# Patient Record
Sex: MALE | Race: WHITE | NOT HISPANIC OR LATINO | Employment: OTHER | URBAN - METROPOLITAN AREA
[De-identification: names, ages, dates, MRNs, and addresses within clinical notes are randomized per-mention and may not be internally consistent; named-entity substitution may affect disease eponyms.]

---

## 2019-08-13 ENCOUNTER — OFFICE VISIT (OUTPATIENT)
Dept: INTERNAL MEDICINE | Facility: CLINIC | Age: 69
End: 2019-08-13

## 2019-08-13 VITALS
WEIGHT: 178 LBS | BODY MASS INDEX: 26.36 KG/M2 | HEIGHT: 69 IN | HEART RATE: 78 BPM | OXYGEN SATURATION: 98 % | DIASTOLIC BLOOD PRESSURE: 60 MMHG | SYSTOLIC BLOOD PRESSURE: 120 MMHG

## 2019-08-13 DIAGNOSIS — E55.9 VITAMIN D DEFICIENCY: Chronic | ICD-10-CM

## 2019-08-13 DIAGNOSIS — F17.210 CIGARETTE NICOTINE DEPENDENCE WITHOUT COMPLICATION: ICD-10-CM

## 2019-08-13 DIAGNOSIS — Z51.81 THERAPEUTIC DRUG MONITORING: ICD-10-CM

## 2019-08-13 DIAGNOSIS — Z86.73 HISTORY OF STROKE: Chronic | ICD-10-CM

## 2019-08-13 DIAGNOSIS — K21.00 GASTROESOPHAGEAL REFLUX DISEASE WITH ESOPHAGITIS: ICD-10-CM

## 2019-08-13 DIAGNOSIS — N40.1 BENIGN PROSTATIC HYPERPLASIA WITH WEAK URINARY STREAM: Chronic | ICD-10-CM

## 2019-08-13 DIAGNOSIS — K59.4 PROCTALGIA FUGAX: ICD-10-CM

## 2019-08-13 DIAGNOSIS — R91.8 MULTIPLE PULMONARY NODULES: Chronic | ICD-10-CM

## 2019-08-13 DIAGNOSIS — Z87.39 HISTORY OF GOUT: Chronic | ICD-10-CM

## 2019-08-13 DIAGNOSIS — D75.89 MACROCYTOSIS: ICD-10-CM

## 2019-08-13 DIAGNOSIS — J30.1 SEASONAL ALLERGIC RHINITIS DUE TO POLLEN: Chronic | ICD-10-CM

## 2019-08-13 DIAGNOSIS — Z98.61 HISTORY OF PTCA: Chronic | ICD-10-CM

## 2019-08-13 DIAGNOSIS — R39.12 BENIGN PROSTATIC HYPERPLASIA WITH WEAK URINARY STREAM: Chronic | ICD-10-CM

## 2019-08-13 DIAGNOSIS — A63.0 GENITAL WARTS: Chronic | ICD-10-CM

## 2019-08-13 DIAGNOSIS — Z95.1 HX OF CABG: Chronic | ICD-10-CM

## 2019-08-13 DIAGNOSIS — Z86.010 HISTORY OF COLON POLYPS: Chronic | ICD-10-CM

## 2019-08-13 DIAGNOSIS — I10 BENIGN ESSENTIAL HYPERTENSION: Chronic | ICD-10-CM

## 2019-08-13 DIAGNOSIS — Z86.79 HISTORY OF ABDOMINAL AORTIC ANEURYSM (AAA): Chronic | ICD-10-CM

## 2019-08-13 DIAGNOSIS — J43.9 PULMONARY EMPHYSEMA, UNSPECIFIED EMPHYSEMA TYPE (HCC): Chronic | ICD-10-CM

## 2019-08-13 DIAGNOSIS — Z71.6 ENCOUNTER FOR SMOKING CESSATION COUNSELING: ICD-10-CM

## 2019-08-13 DIAGNOSIS — E78.5 HYPERLIPIDEMIA, UNSPECIFIED HYPERLIPIDEMIA TYPE: Primary | Chronic | ICD-10-CM

## 2019-08-13 DIAGNOSIS — I25.10 OCCLUSIVE CORONARY ARTERY DISEASE: ICD-10-CM

## 2019-08-13 PROBLEM — H10.45 CHRONIC ALLERGIC CONJUNCTIVITIS: Chronic | Status: ACTIVE | Noted: 2019-08-13

## 2019-08-13 PROBLEM — R93.89 ABNORMAL CT OF THE CHEST: Status: RESOLVED | Noted: 2019-08-13 | Resolved: 2019-08-13

## 2019-08-13 PROBLEM — H43.811 POSTERIOR VITREOUS DETACHMENT OF RIGHT EYE: Status: RESOLVED | Noted: 2019-08-13 | Resolved: 2019-08-13

## 2019-08-13 PROBLEM — Z98.890 HISTORY OF CARDIAC CATHETERIZATION: Status: ACTIVE | Noted: 2019-08-13

## 2019-08-13 PROBLEM — Z86.0100 HISTORY OF COLON POLYPS: Chronic | Status: ACTIVE | Noted: 2019-08-13

## 2019-08-13 PROBLEM — Z86.0100 HISTORY OF COLON POLYPS: Status: ACTIVE | Noted: 2019-08-13

## 2019-08-13 PROBLEM — N40.0 BENIGN PROSTATIC HYPERPLASIA WITHOUT LOWER URINARY TRACT SYMPTOMS: Status: ACTIVE | Noted: 2019-08-13

## 2019-08-13 PROBLEM — H43.811 POSTERIOR VITREOUS DETACHMENT OF RIGHT EYE: Status: ACTIVE | Noted: 2019-08-13

## 2019-08-13 PROBLEM — R93.89 ABNORMAL CT OF THE CHEST: Status: ACTIVE | Noted: 2019-08-13

## 2019-08-13 PROBLEM — Z98.890 HISTORY OF CARDIAC CATHETERIZATION: Status: RESOLVED | Noted: 2019-08-13 | Resolved: 2019-08-13

## 2019-08-13 PROBLEM — H10.45 CHRONIC ALLERGIC CONJUNCTIVITIS: Status: ACTIVE | Noted: 2019-08-13

## 2019-08-13 PROCEDURE — 99406 BEHAV CHNG SMOKING 3-10 MIN: CPT | Performed by: INTERNAL MEDICINE

## 2019-08-13 PROCEDURE — 99204 OFFICE O/P NEW MOD 45 MIN: CPT | Performed by: INTERNAL MEDICINE

## 2019-08-13 RX ORDER — ASPIRIN 325 MG
TABLET ORAL
Start: 2019-08-13 | End: 2019-11-21

## 2019-08-13 RX ORDER — ATORVASTATIN CALCIUM 80 MG/1
TABLET, FILM COATED ORAL
Qty: 30 TABLET | Refills: 6
Start: 2019-08-13 | End: 2019-08-21 | Stop reason: SDUPTHER

## 2019-08-13 RX ORDER — CLINDAMYCIN PHOSPHATE 10 MG/G
GEL TOPICAL
COMMUNITY
Start: 2018-11-03 | End: 2019-10-08 | Stop reason: SDUPTHER

## 2019-08-13 RX ORDER — TAMSULOSIN HYDROCHLORIDE 0.4 MG/1
CAPSULE ORAL
Qty: 60 CAPSULE | Refills: 6
Start: 2019-08-13

## 2019-08-13 RX ORDER — ESOMEPRAZOLE MAGNESIUM 40 MG/1
CAPSULE, DELAYED RELEASE ORAL
Start: 2019-08-13 | End: 2019-09-06 | Stop reason: SDUPTHER

## 2019-08-13 RX ORDER — METOPROLOL SUCCINATE 25 MG/1
25 TABLET, EXTENDED RELEASE ORAL DAILY
COMMUNITY
Start: 2019-01-12 | End: 2019-08-13

## 2019-08-13 RX ORDER — FLUTICASONE PROPIONATE 50 MCG
SPRAY, SUSPENSION (ML) NASAL
Start: 2019-08-13 | End: 2019-12-11

## 2019-08-13 RX ORDER — DIAZEPAM 5 MG/1
5 TABLET ORAL
COMMUNITY
Start: 2019-07-26 | End: 2019-08-13 | Stop reason: SDUPTHER

## 2019-08-13 RX ORDER — CHOLECALCIFEROL (VITAMIN D3) 125 MCG
1 TABLET ORAL DAILY
COMMUNITY
End: 2022-05-02

## 2019-08-13 RX ORDER — FLUOROMETHOLONE 0.1 %
SUSPENSION, DROPS(FINAL DOSAGE FORM)(ML) OPHTHALMIC (EYE)
Refills: 5 | COMMUNITY
Start: 2019-08-09

## 2019-08-13 RX ORDER — METOPROLOL SUCCINATE 50 MG/1
50 TABLET, EXTENDED RELEASE ORAL
COMMUNITY
Start: 2019-01-12 | End: 2019-08-13

## 2019-08-13 RX ORDER — CHLORHEXIDINE GLUCONATE 0.12 MG/ML
RINSE ORAL
COMMUNITY
Start: 2019-03-24 | End: 2020-12-10 | Stop reason: SDUPTHER

## 2019-08-13 RX ORDER — TAMSULOSIN HYDROCHLORIDE 0.4 MG/1
CAPSULE ORAL
COMMUNITY
Start: 2019-08-07 | End: 2019-08-13 | Stop reason: SDUPTHER

## 2019-08-13 RX ORDER — PODOFILOX 5 MG/ML
SOLUTION TOPICAL
COMMUNITY
Start: 2018-10-29

## 2019-08-13 RX ORDER — CYANOCOBALAMIN (VITAMIN B-12) 1000 MCG
1 TABLET ORAL DAILY
COMMUNITY

## 2019-08-13 RX ORDER — DIAZEPAM 5 MG/1
TABLET ORAL
Start: 2019-08-13 | End: 2019-09-06 | Stop reason: SDUPTHER

## 2019-08-13 RX ORDER — CLOPIDOGREL BISULFATE 75 MG/1
TABLET ORAL
Qty: 30 TABLET
Start: 2019-08-13

## 2019-08-13 RX ORDER — FLUTICASONE PROPIONATE 50 MCG
2 SPRAY, SUSPENSION (ML) NASAL DAILY
COMMUNITY
Start: 2019-07-25 | End: 2019-08-13 | Stop reason: SDUPTHER

## 2019-08-13 RX ORDER — MULTIVIT WITH MINERALS/LUTEIN
250 TABLET ORAL DAILY
COMMUNITY
End: 2021-11-16

## 2019-08-13 RX ORDER — EZETIMIBE 10 MG/1
TABLET ORAL
Start: 2019-08-13 | End: 2019-08-21 | Stop reason: SDUPTHER

## 2019-08-13 RX ORDER — DESONIDE 0.5 MG/G
CREAM TOPICAL
COMMUNITY
Start: 2018-03-12 | End: 2023-02-21 | Stop reason: SDUPTHER

## 2019-08-13 NOTE — PROGRESS NOTES
08/13/2019    Patient Information  Vernon Willis                                                                                          [address]  [facility] [address]      1950  [unfilled]  There is no work phone number on file.    Chief Complaint:     New patient to get established.  Follow-up hyperlipidemia, esophageal reflux, occlusive coronary artery disease, proctalgia fugax, hyperlipidemia, hypertension, history of AAA, gout, BPH, history of CABG and PTCA, pulmonary emphysema, multiple pulmonary nodules, history of colon polyps, vitamin D deficiency, history of stroke which is questionable, genital warts, environmental allergies/allergic rhinitis.  Has no new acute complaints but has many questions regarding his health.    History of Present Illness:    Patient with a history of multiple medical problems as outlined in chief complaint presents today to follow-up on his medical problems without labs.  His past medical history extensively reviewed and entered into the electronic medical record and updated as much as possible.  Some information is missing and we are attempting to obtain that.    Review of Systems   Constitution: Negative.   HENT: Negative.    Eyes: Negative.    Cardiovascular: Negative.    Respiratory: Negative.    Endocrine: Negative.    Hematologic/Lymphatic: Negative.    Skin: Negative.    Musculoskeletal: Negative.    Gastrointestinal: Negative.         Periodic anorectal pain.   Genitourinary: Negative.    Neurological: Negative.    Psychiatric/Behavioral: Negative.    Allergic/Immunologic: Negative.        Active Problems:    Patient Active Problem List   Diagnosis   • History of abdominal aortic aneurysm (AAA), 6/23/2017--endovascular AAA repair with stent.   • Occlusive coronary artery disease, 12/2/2015--cardiac cath: LIMA to LAD (patent); RADHA to RCA (occluded 100%); SVG to obtuse marginal (occluded 100%); SVG to diagonal (occluded 100%).     • Benign  essential hypertension   • Hyperlipidemia   • History of gout   • Chronic allergic conjunctivitis   • Vitamin D deficiency   • Benign prostatic hyperplasia with weak urinary stream   • Therapeutic drug monitoring   • History of stroke, questionable.   • History of PTCA, 7/15/2013--drug-eluting stent proximal and origin of LM.   • Hx of CABG, 2004--LIMA to LAD; RADHA to RCA; SVG to obtuse marginal; SVG to diagonal.   • Pulmonary emphysema (CMS/HCC)   • Multiple pulmonary nodules, 1/29/2019--minimal change in clustered nodularity and tree-in-bud nodularity upper lobes and right middle lobe.  Recommend yearly low-dose CT screening.   • History of colon polyps   • Genital warts   • Allergic rhinitis   • Gastroesophageal reflux disease with esophagitis   • Proctalgia fugax   • Cigarette nicotine dependence    • Macrocytosis         Past Medical History:   Diagnosis Date   • Abnormal CT of the chest 8/13/2019 January 29, 2019--CT scan of the chest without contrast performed for abnormal CT scan reveals minimal change in clustered nodularity and tree-in-bud nodularity involving dominantly the upper lobes and right middle lobe, likely postinfectious or postinflammatory.  A few new areas of peripheral mucus plugging noted at the right lower lobe.  No new or enlarging pulmonary nodules.  Return to annual s   • Allergic rhinitis 8/13/2019   • Benign essential hypertension 8/13/2019   • Benign prostatic hyperplasia with weak urinary stream 8/13/2019   • Chronic allergic conjunctivitis 8/13/2019   • Cigarette nicotine dependence  8/13/2019   • Gastroesophageal reflux disease with esophagitis 8/13/2019   • Genital warts 8/13/2019   • History of abdominal aortic aneurysm (AAA), 6/23/2017--endovascular AAA repair with stent. 8/13/2019 August 1, 2017--CTA of the abdomen: There has been endovascular repair of the abdominal aortic aneurysm with stent extending from just below the origin of the left renal vein into both common  iliac arteries. No evidence for endoleak on arterial phase imaging. The excluded aneurysmal sac measures 4.8 cm in maximal diameter, previously 5.0 cm. Aortoiliac atherosclerosis with moderate narrowing at th   • History of cardiac catheterization 8/13/2019 December 2, 2015--cardiac catheterization.  INDICATION(S): This is a 64-year-old male with a history of coronary artery disease status post coronary artery bypass grafting. He presented to Dr. Clarke with symptoms consistent with his prior angina. He underwent a nuclear stress test that showed a mid-anterior wall myocardial infarction with a small amount of ischemia in the ventricular apex. He was s   • History of colon polyps 8/13/2019 February 2016--colonoscopy with polypectomy.  Pathology not known.  2010--colonoscopy revealed a serrated adenoma.   • History of gout 8/13/2019   • History of posterior vitreous detachment of right eye, 10/15/2016--repair. 8/13/2019 October 5, 2016--vitrectomy, membrane peel, panretinal endophotocoagulation laser, right eye for preretinal membrane, vitreous hemorrhage, and posterior vitreous detachment of right eye.        • History of PTCA, 7/15/2013--drug-eluting stent proximal and origin of LM. 8/13/2019    July 15, 2013--successful drug-eluting stent PCI to the proximal and origin of the left main using vascular ultrasound guidance.   • History of stroke, questionable. 8/13/2019          • Hx of CABG, 2004--LIMA to LAD; RADHA to RCA; SVG to obtuse marginal; SVG to diagonal. 8/13/2019 December 2, 2015--cardiac catheterization: LIMA to LAD (patent); RADHA to RCA (occluded 100%); SVG to obtuse marginal (occluded 100%); SVG to diagonal (occluded 100%).  2004--four-vessel CABG.  LIMA to LAD; RADHA to RCA; SVG to obtuse marginal; SVG to diagonal.   • Hyperlipidemia 8/13/2019   • Macrocytosis 8/13/2019 July 25, 2019--MCV elevated at 102.2.  Vitamin B12 level was above normal at 1208.  Methylmalonic acid was not performed.    • Multiple pulmonary nodules, 1/29/2019--minimal change in clustered nodularity and tree-in-bud nodularity upper lobes and right middle lobe.  Recommend yearly low-dose CT screening. 8/13/2019 January 29, 2019--CT scan of the chest without contrast performed for abnormal CT scan reveals minimal change in clustered nodularity and tree-in-bud nodularity involving dominantly the upper lobes and right middle lobe, likely postinfectious or postinflammatory.  A few new areas of peripheral mucus plugging noted at the right lower lobe.  No new or enlarging pulmonary nodules.  Return to annual s   • Occlusive coronary artery disease, 12/2/2015--cardiac cath: LIMA to LAD (patent); RADHA to RCA (occluded 100%); SVG to obtuse marginal (occluded 100%); SVG to diagonal (occluded 100%).   8/13/2019 December 2, 2015--cardiac catheterization: LIMA to LAD (patent); RADHA to RCA (occluded 100%); SVG to obtuse marginal (occluded 100%); SVG to diagonal (occluded 100%).  July 15, 2013--successful drug-eluting stent PCI to the proximal and origin of the left main using vascular ultrasound guidance.  2004--four-vessel CABG.  LIMA to LAD; RADHA to RCA; SVG to obtuse marginal; SVG to diagonal.   HPI: Fra   • Proctalgia fugax 8/13/2019   • Pulmonary emphysema (CMS/HCC) 8/13/2019 January 29, 2019--CT scan of the chest without contrast performed for abnormal CT scan reveals minimal change in clustered nodularity and tree-in-bud nodularity involving dominantly the upper lobes and right middle lobe, likely postinfectious or postinflammatory.  A few new areas of peripheral mucus plugging noted at the right lower lobe.  No new or enlarging pulmonary nodules.  Return to annual s   • Vitamin D deficiency 8/13/2019         Past Surgical History:   Procedure Laterality Date   • ABDOMINAL AORTIC ANEURYSM REPAIR W/ ENDOLUMINAL GRAFT  06/23/2017 June 23, 2017--endovascular repair of AAA.   • CARDIAC CATHETERIZATION  12/02/2015 December 2,  2015--cardiac catheterization.  See past medical history for details.   • CATARACT EXTRACTION, BILATERAL     • COLONOSCOPY  2010 2010--colonoscopy revealed a serrated adenoma.   • COLONOSCOPY W/ POLYPECTOMY  02/2016 February 2016--colonoscopy with polypectomy.  Pathology not known.   • CORONARY ANGIOPLASTY WITH STENT PLACEMENT  07/15/2013    July 15, 2013--successful drug-eluting stent PCI to the proximal and origin of the left main using vascular ultrasound guidance.   • CORONARY ARTERY BYPASS GRAFT  2004 2004--four-vessel CABG.  LIMA to LAD; RADHA to RCA; SVG to obtuse marginal; SVG to diagonal.   • PARS PLANA VITRECTOMY W/ ENDOPHOTOCOAGULATION  10/05/2016    October 5, 2016--vitrectomy, membrane peel, panretinal endophotocoagulation laser, right eye for preretinal membrane, vitreous hemorrhage, and posterior vitreous detachment of right eye.   • UPPER GASTROINTESTINAL ENDOSCOPY  02/2016 February 2016--EGD reportedly revealed esophagitis and gastritis.         Allergies   Allergen Reactions   • Codeine Hives   • Msg [Monosodium Glutamate] Other (See Comments)     Passes out   • Penicillins Hives           Current Outpatient Medications:   •  aspirin 325 MG tablet, Take 1 p.o. daily, Disp: , Rfl:   •  atorvastatin (LIPITOR) 80 MG tablet, Take 1 p.o. daily for high cholesterol, Disp: 30 tablet, Rfl: 6  •  chlorhexidine (PAROEX) 0.12 % solution, USE 15 MLS IN THE MOUTH OR THROAT TWO TIMES DAILY (DO NOT SWALLOW), Disp: , Rfl:   •  Cholecalciferol (VITAMIN D3) 5000 units capsule capsule, Take  by mouth., Disp: , Rfl:   •  clindamycin (CLINDAGEL) 1 % gel, APPLY EXTERNALLY TO THE AFFECTED AREA TWICE DAILY, Disp: , Rfl:   •  clopidogrel (PLAVIX) 75 MG tablet, Take 1 p.o. daily for blood thinner, Disp: 30 tablet, Rfl:   •  Cyanocobalamin (B-12) 1000 MCG tablet, Take 1 tablet by mouth Daily., Disp: , Rfl:   •  desonide (DESOWEN) 0.05 % cream, Apply  topically to the appropriate area as directed., Disp: , Rfl:   •   diazePAM (VALIUM) 5 MG tablet, take 1 p.o. daily as needed, Disp: , Rfl:   •  Ergocalciferol (VITAMIN D2) 2000 units tablet, Take 1 tablet by mouth Daily., Disp: , Rfl:   •  esomeprazole (nexIUM) 40 MG capsule, Take 1 p.o. daily before the first meal for reflux, Disp: , Rfl:   •  ezetimibe (ZETIA) 10 MG tablet, Take 1 p.o. daily for high cholesterol, Disp: , Rfl:   •  fluorometholone (FML) 0.1 % ophthalmic suspension, INSTILL 1 DROP INTO EACH EYE TWICE DAILY, Disp: , Rfl: 5  •  fluticasone (FLONASE) 50 MCG/ACT nasal spray, 2 sprays each nostril daily as needed for allergic rhinitis, Disp: , Rfl:   •  podofilox (CONDYLOX) 0.5 % external solution, Apply  topically to the appropriate area as directed., Disp: , Rfl:   •  tamsulosin (FLOMAX) 0.4 MG capsule 24 hr capsule, Take 1 p.o. twice daily for prostate, Disp: 60 capsule, Rfl: 6  •  vitamin C (ASCORBIC ACID) 250 MG tablet, Take 250 mg by mouth Daily., Disp: , Rfl:       Family History   Problem Relation Age of Onset   • Diabetes type II Mother    • Coronary artery disease Father    • Diabetes type II Father    • Kidney disease Father    • Hypertension Father    • Crohn's disease Sister    • Coronary artery disease Brother         Brother  of a myocardial infarction at age 53         Social History     Socioeconomic History   • Marital status:      Spouse name: Not on file   • Number of children: Not on file   • Years of education: Not on file   • Highest education level: Not on file   Occupational History   • Occupation:    Social Needs   • Financial resource strain: Not hard at all   • Food insecurity:     Worry: Never true     Inability: Never true   • Transportation needs:     Medical: No     Non-medical: No   Tobacco Use   • Smoking status: Current Every Day Smoker     Packs/day: 0.50     Types: Cigarettes     Start date:    • Smokeless tobacco: Never Used   Substance and Sexual Activity   • Alcohol use: No     Frequency: Never   • Drug  "use: No   • Sexual activity: Not Currently     Partners: Female   Lifestyle   • Physical activity:     Days per week: 7 days     Minutes per session: 20 min   • Stress: Only a little   Relationships   • Social connections:     Talks on phone: Patient refused     Gets together: Patient refused     Attends Alev[employer] service: Patient refused     Active member of club or organization: Patient refused     Attends meetings of clubs or organizations: Patient refused     Relationship status: Patient refused         Vitals:    08/13/19 1531   BP: 120/60   BP Location: Left arm   Patient Position: Sitting   Cuff Size: Adult   Pulse: 78   SpO2: 98%   Weight: 80.7 kg (178 lb)   Height: 175.3 cm (69\")          Physical Exam:    General: Alert and oriented x 3.  No acute distress.  Normal affect, but patient has somewhat pressured speech and flight of ideas.  Very difficult for him to stay focused on the subject at hand.  HEENT: Pupils equal, round, reactive to light; extraocular movements intact; sclerae nonicteric; pharynx, ear canals and TMs normal.  Neck: Without JVD, thyromegaly, bruit, or adenopathy.  Lungs: Clear to auscultation in all fields.  Heart: Regular rate and rhythm without murmur, rub, gallop, or click.  Abdomen: Soft, nontender, without hepatosplenomegaly or hernia.  Bowel sounds normal.  : Deferred.  Rectal: Deferred.  Extremities: Without clubbing, cyanosis, edema, or pulse deficit.  Neurologic: Intact without focal deficit.  Normal station and gait observed during ingress and egress from the examination room.  Skin: Without significant lesion.  Musculoskeletal: Unremarkable.    Lab/other results:    I reviewed the most recent lab work from his previous doctor.    Assessment/Plan:     Diagnosis Plan   1. Hyperlipidemia, unspecified hyperlipidemia type  ezetimibe (ZETIA) 10 MG tablet    CK    Comprehensive Metabolic Panel    NMR LipoProfile    TSH    T4, Free    T3, Free   2. Gastroesophageal reflux disease " with esophagitis  esomeprazole (nexIUM) 40 MG capsule   3. Occlusive coronary artery disease, 12/2/2015--cardiac cath: LIMA to LAD (patent); RADHA to RCA (occluded 100%); SVG to obtuse marginal (occluded 100%); SVG to diagonal (occluded 100%).    clopidogrel (PLAVIX) 75 MG tablet    aspirin 325 MG tablet   4. Proctalgia fugax  diazePAM (VALIUM) 5 MG tablet   5. Hyperlipidemia  atorvastatin (LIPITOR) 80 MG tablet   6. Benign essential hypertension     7. History of abdominal aortic aneurysm (AAA), 6/23/2017--endovascular AAA repair with stent.     8. History of gout     9. Benign prostatic hyperplasia with weak urinary stream     10. Hx of CABG, 2004--LIMA to LAD; RADHA to RCA; SVG to obtuse marginal; SVG to diagonal.     11. History of PTCA, 7/15/2013--drug-eluting stent proximal and origin of LM.     12. Pulmonary emphysema, unspecified emphysema type (CMS/HCC)     13. Multiple pulmonary nodules, 1/29/2019--minimal change in clustered nodularity and tree-in-bud nodularity upper lobes and right middle lobe.  Recommend yearly low-dose CT screening.     14. History of colon polyps     15. Vitamin D deficiency  Vitamin D 25 Hydroxy   16. History of stroke, questionable.     17. Genital warts     18. Allergic rhinitis  fluticasone (FLONASE) 50 MCG/ACT nasal spray   19. Therapeutic drug monitoring  CBC (No Diff)   20. Cigarette nicotine dependence      21. Macrocytosis  CBC (No Diff)    Methylmalonic Acid, Serum     Patient presents today as a new patient to follow-up on his multiple medical issues.  Patient has hyperlipidemia which apparently has been under reasonable control although his and has never been assessed with an NMR which is important given his occlusive coronary artery disease.  Reflux apparently has not been a big issue.  He continues to have periodic or anorectal pain from proctalgia fugax.  His blood pressure seems to be reasonably controlled.  He has a history of abdominal aortic aneurysm and had an  endovascular stent placed in 2017.  He is followed by the vascular surgeon.  He has a history of gout but is not on allopurinol and that needs to be assessed in the near future.  He does have symptomatic BPH which seems tolerable.  Patient has had multiple cardiac vascular interventions and he continues to smoke.  I had a discussion regarding cigarette smoking with the patient as noted below.  Pulmonary emphysema seems asymptomatic.  Patient does have multiple pulmonary nodules that were just assessed in January which showed minimal change.  He has a history of colon polyps but I do not have the pathology report and will obtain that.  Vitamin D needs to be assessed.  Macrocytosis needs to be evaluated in the near future.    Encounter for smoking cessation: I advised the patient of the risk of continued use tobacco, specifically cigarettes and I provided patient with smoking cessation educational materials and discussed how to quit including the use of medications.  Patient would like to quit but I am not sure that he is currently dedicated to quit.  I had a long discussion regarding his cardiovascular disease and the contribution of cigarette smoking towards progression of this disease and patient seems to understand.  I mentioned that he could utilize some medications including Wellbutrin, Chantix, and nicotine patches/gum.  Patient will consider this and has expressed his willingness to quit but not at the present time.  Approximately 6 minutes was spent discussing smoking cessation with patient today.    Plan is as follows: No change in current medical regimen.  I will continue to review and update patient's medical record.  Patient had recent lab work at his previous doctor's and I reviewed that and therefore I see no need to do any lab work immediately.  However, would like to have an NMR and I think the best thing for us to do is set up fasting lab work, follow-up, and subsequent Medicare wellness visit in  November of this year.    Note: Greater than 45 minutes was spent evaluating this patient today who is new to this practice and the specialty.  Greater than 50% of this time was spent counseling patient regarding his multiple medical problems.  [id number] level service justified.    Procedures

## 2019-08-21 PROBLEM — Z71.6 ENCOUNTER FOR SMOKING CESSATION COUNSELING: Status: ACTIVE | Noted: 2019-08-21

## 2019-08-21 RX ORDER — EZETIMIBE 10 MG/1
TABLET ORAL
Start: 2019-08-21 | End: 2019-10-07 | Stop reason: SDUPTHER

## 2019-08-21 RX ORDER — ATORVASTATIN CALCIUM 80 MG/1
TABLET, FILM COATED ORAL
Qty: 30 TABLET | Refills: 6
Start: 2019-08-21 | End: 2019-10-01 | Stop reason: SDUPTHER

## 2019-09-06 DIAGNOSIS — K21.00 GASTROESOPHAGEAL REFLUX DISEASE WITH ESOPHAGITIS: ICD-10-CM

## 2019-09-06 DIAGNOSIS — K59.4 PROCTALGIA FUGAX: ICD-10-CM

## 2019-09-06 RX ORDER — DIAZEPAM 5 MG/1
TABLET ORAL
Qty: 90 TABLET | Refills: 0 | Status: SHIPPED | OUTPATIENT
Start: 2019-09-06 | End: 2020-01-13

## 2019-09-06 RX ORDER — ESOMEPRAZOLE MAGNESIUM 40 MG/1
CAPSULE, DELAYED RELEASE ORAL
Qty: 90 CAPSULE | Refills: 0 | Status: SHIPPED | OUTPATIENT
Start: 2019-09-06 | End: 2019-09-06 | Stop reason: SDUPTHER

## 2019-09-06 RX ORDER — ESOMEPRAZOLE MAGNESIUM 40 MG/1
CAPSULE, DELAYED RELEASE ORAL
Qty: 90 CAPSULE | Refills: 0 | Status: SHIPPED | OUTPATIENT
Start: 2019-09-06 | End: 2019-11-26 | Stop reason: SDUPTHER

## 2019-09-11 ENCOUNTER — TELEPHONE (OUTPATIENT)
Dept: INTERNAL MEDICINE | Facility: CLINIC | Age: 69
End: 2019-09-11

## 2019-09-11 NOTE — TELEPHONE ENCOUNTER
Pt mailed in his living will to be scanned into his file as well as a request to update pharmacy info to include [employer] mail delivery and [facility].  Pt also noted he will be getting the shingles and tdap vaccine at [facility]

## 2019-10-01 RX ORDER — ATORVASTATIN CALCIUM 80 MG/1
TABLET, FILM COATED ORAL
Qty: 30 TABLET | Refills: 6 | Status: SHIPPED | OUTPATIENT
Start: 2019-10-01 | End: 2019-10-09 | Stop reason: SDUPTHER

## 2019-10-07 RX ORDER — EZETIMIBE 10 MG/1
TABLET ORAL
Qty: 90 TABLET | Refills: 1 | Status: SHIPPED | OUTPATIENT
Start: 2019-10-07 | End: 2020-01-20

## 2019-10-08 ENCOUNTER — CLINICAL SUPPORT (OUTPATIENT)
Dept: INTERNAL MEDICINE | Facility: CLINIC | Age: 69
End: 2019-10-08

## 2019-10-08 DIAGNOSIS — Z23 NEED FOR INFLUENZA VACCINATION: Primary | ICD-10-CM

## 2019-10-08 PROCEDURE — 90653 IIV ADJUVANT VACCINE IM: CPT | Performed by: INTERNAL MEDICINE

## 2019-10-08 PROCEDURE — G0008 ADMIN INFLUENZA VIRUS VAC: HCPCS | Performed by: INTERNAL MEDICINE

## 2019-10-08 RX ORDER — CLINDAMYCIN PHOSPHATE 10 MG/G
GEL TOPICAL 2 TIMES DAILY
Qty: 30 G | Refills: 0 | Status: SHIPPED | OUTPATIENT
Start: 2019-10-08 | End: 2019-10-15 | Stop reason: SDUPTHER

## 2019-10-09 RX ORDER — ATORVASTATIN CALCIUM 80 MG/1
TABLET, FILM COATED ORAL
Qty: 90 TABLET | Refills: 0 | Status: SHIPPED | OUTPATIENT
Start: 2019-10-09 | End: 2020-05-01

## 2019-10-15 RX ORDER — CLINDAMYCIN PHOSPHATE 10 MG/G
GEL TOPICAL 2 TIMES DAILY
Qty: 30 G | Refills: 0 | Status: SHIPPED | OUTPATIENT
Start: 2019-10-15 | End: 2020-05-21

## 2019-11-21 ENCOUNTER — OFFICE VISIT (OUTPATIENT)
Dept: INTERNAL MEDICINE | Facility: CLINIC | Age: 69
End: 2019-11-21

## 2019-11-21 VITALS
HEIGHT: 69 IN | BODY MASS INDEX: 27.11 KG/M2 | DIASTOLIC BLOOD PRESSURE: 60 MMHG | WEIGHT: 183 LBS | SYSTOLIC BLOOD PRESSURE: 116 MMHG | HEART RATE: 66 BPM | OXYGEN SATURATION: 98 %

## 2019-11-21 DIAGNOSIS — Z98.61 HISTORY OF PTCA: Chronic | ICD-10-CM

## 2019-11-21 DIAGNOSIS — E55.9 VITAMIN D DEFICIENCY: Chronic | ICD-10-CM

## 2019-11-21 DIAGNOSIS — F17.210 CIGARETTE NICOTINE DEPENDENCE WITHOUT COMPLICATION: Chronic | ICD-10-CM

## 2019-11-21 DIAGNOSIS — Z86.010 HISTORY OF COLON POLYPS: Chronic | ICD-10-CM

## 2019-11-21 DIAGNOSIS — F17.218 NICOTINE DEPENDENCE, CIGARETTES, WITH OTHER NICOTINE-INDUCED DISORDERS: ICD-10-CM

## 2019-11-21 DIAGNOSIS — E78.5 HYPERLIPIDEMIA, UNSPECIFIED HYPERLIPIDEMIA TYPE: Chronic | ICD-10-CM

## 2019-11-21 DIAGNOSIS — K21.00 GASTROESOPHAGEAL REFLUX DISEASE WITH ESOPHAGITIS: Chronic | ICD-10-CM

## 2019-11-21 DIAGNOSIS — I10 BENIGN ESSENTIAL HYPERTENSION: Chronic | ICD-10-CM

## 2019-11-21 DIAGNOSIS — R39.12 BENIGN PROSTATIC HYPERPLASIA WITH WEAK URINARY STREAM: Chronic | ICD-10-CM

## 2019-11-21 DIAGNOSIS — Z87.39 HISTORY OF GOUT: Chronic | ICD-10-CM

## 2019-11-21 DIAGNOSIS — I25.10 OCCLUSIVE CORONARY ARTERY DISEASE: Chronic | ICD-10-CM

## 2019-11-21 DIAGNOSIS — Z86.79 HISTORY OF ABDOMINAL AORTIC ANEURYSM (AAA): Chronic | ICD-10-CM

## 2019-11-21 DIAGNOSIS — R91.8 MULTIPLE PULMONARY NODULES: Chronic | ICD-10-CM

## 2019-11-21 DIAGNOSIS — Z11.59 NEED FOR HEPATITIS C SCREENING TEST: ICD-10-CM

## 2019-11-21 DIAGNOSIS — K59.4 PROCTALGIA FUGAX: Chronic | ICD-10-CM

## 2019-11-21 DIAGNOSIS — Z86.73 HISTORY OF STROKE: Chronic | ICD-10-CM

## 2019-11-21 DIAGNOSIS — D75.89 MACROCYTOSIS: Chronic | ICD-10-CM

## 2019-11-21 DIAGNOSIS — N40.1 BENIGN PROSTATIC HYPERPLASIA WITH WEAK URINARY STREAM: Chronic | ICD-10-CM

## 2019-11-21 DIAGNOSIS — Z95.1 HX OF CABG: Chronic | ICD-10-CM

## 2019-11-21 DIAGNOSIS — J43.9 PULMONARY EMPHYSEMA, UNSPECIFIED EMPHYSEMA TYPE (HCC): Chronic | ICD-10-CM

## 2019-11-21 DIAGNOSIS — Z00.00 MEDICARE ANNUAL WELLNESS VISIT, SUBSEQUENT: Primary | ICD-10-CM

## 2019-11-21 PROBLEM — Z23 NEED FOR INFLUENZA VACCINATION: Status: RESOLVED | Noted: 2019-10-08 | Resolved: 2019-11-21

## 2019-11-21 PROBLEM — Z71.6 ENCOUNTER FOR SMOKING CESSATION COUNSELING: Status: RESOLVED | Noted: 2019-08-21 | Resolved: 2019-11-21

## 2019-11-21 PROCEDURE — 96160 PT-FOCUSED HLTH RISK ASSMT: CPT | Performed by: INTERNAL MEDICINE

## 2019-11-21 PROCEDURE — G0438 PPPS, INITIAL VISIT: HCPCS | Performed by: INTERNAL MEDICINE

## 2019-11-21 PROCEDURE — 99214 OFFICE O/P EST MOD 30 MIN: CPT | Performed by: INTERNAL MEDICINE

## 2019-11-21 NOTE — PROGRESS NOTES
11/21/2019    Patient Information  Vernon Willis                                                                                          [address]  [facility] [address]      1950  [unfilled]  There is no work phone number on file.    Chief Complaint:     Subsequent Medicare wellness visit.  Follow-up hyperlipidemia, hypertension, occlusive coronary artery disease and history of CABG as well as PTCA, history of AAA, gout, BPH, questionable history of stroke, pulmonary emphysema, multiple pulmonary nodules, history of colon polyps, esophageal reflux, proctalgia fugax, macrocytosis, vitamin D deficiency.  No new acute complaints.    History of Present Illness:    Patient with a history of multiple medical problems as outlined in the chief complaint presents today for subsequent Medicare wellness visit.  Patient also had lab work in order to monitor his chronic medical issues.  His past medical history reviewed and updated were necessary including health maintenance parameters.  This reveals he is up-to-date or else accounted for.    Review of Systems   Constitution: Negative.   HENT: Negative.    Eyes: Negative.    Cardiovascular: Negative.    Respiratory: Negative.    Endocrine: Negative.    Hematologic/Lymphatic: Negative.    Skin: Negative.    Musculoskeletal: Negative.    Gastrointestinal: Negative.    Genitourinary: Negative.    Neurological: Negative.    Psychiatric/Behavioral: Negative.    Allergic/Immunologic: Negative.        Active Problems:    Patient Active Problem List   Diagnosis   • History of abdominal aortic aneurysm (AAA), 6/23/2017--endovascular AAA repair with stent.   • Occlusive coronary artery disease, 12/2/2015--cardiac cath: LIMA to LAD (patent); RADHA to RCA (occluded 100%); SVG to obtuse marginal (occluded 100%); SVG to diagonal (occluded 100%).     • Hyperlipidemia   • History of gout   • Chronic allergic conjunctivitis   • Vitamin D deficiency   • Benign prostatic  hyperplasia with weak urinary stream   • Therapeutic drug monitoring   • History of stroke, questionable.   • History of PTCA, 7/15/2013--drug-eluting stent proximal and origin of LM.   • Hx of CABG, 2004--LIMA to LAD; RADHA to RCA; SVG to obtuse marginal; SVG to diagonal.   • Pulmonary emphysema (CMS/HCC)   • Multiple pulmonary nodules, 1/29/2019--minimal change in clustered nodularity and tree-in-bud nodularity upper lobes and right middle lobe.  Recommend yearly low-dose CT screening.   • History of colon polyps   • Genital warts   • Allergic rhinitis   • Gastroesophageal reflux disease with esophagitis   • Proctalgia fugax   • Cigarette nicotine dependence    • Macrocytosis         Past Medical History:   Diagnosis Date   • Abnormal CT of the chest 8/13/2019 January 29, 2019--CT scan of the chest without contrast performed for abnormal CT scan reveals minimal change in clustered nodularity and tree-in-bud nodularity involving dominantly the upper lobes and right middle lobe, likely postinfectious or postinflammatory.  A few new areas of peripheral mucus plugging noted at the right lower lobe.  No new or enlarging pulmonary nodules.  Return to annual s   • Allergic rhinitis 8/13/2019   • Benign prostatic hyperplasia with weak urinary stream 8/13/2019   • Chronic allergic conjunctivitis 8/13/2019   • Cigarette nicotine dependence  8/13/2019   • Gastroesophageal reflux disease with esophagitis 8/13/2019   • Genital warts 8/13/2019   • History of abdominal aortic aneurysm (AAA), 6/23/2017--endovascular AAA repair with stent. 8/13/2019 August 1, 2017--CTA of the abdomen: There has been endovascular repair of the abdominal aortic aneurysm with stent extending from just below the origin of the left renal vein into both common iliac arteries. No evidence for endoleak on arterial phase imaging. The excluded aneurysmal sac measures 4.8 cm in maximal diameter, previously 5.0 cm. Aortoiliac atherosclerosis with  moderate narrowing at th   • History of cardiac catheterization 8/13/2019 December 2, 2015--cardiac catheterization.  INDICATION(S): This is a 64-year-old male with a history of coronary artery disease status post coronary artery bypass grafting. He presented to Dr. Clarke with symptoms consistent with his prior angina. He underwent a nuclear stress test that showed a mid-anterior wall myocardial infarction with a small amount of ischemia in the ventricular apex. He was s   • History of colon polyps 8/13/2019 February 2016--colonoscopy with polypectomy.  Pathology not known.  2010--colonoscopy revealed a serrated adenoma.   • History of gout 8/13/2019   • History of posterior vitreous detachment of right eye, 10/15/2016--repair. 8/13/2019 October 5, 2016--vitrectomy, membrane peel, panretinal endophotocoagulation laser, right eye for preretinal membrane, vitreous hemorrhage, and posterior vitreous detachment of right eye.        • History of PTCA, 7/15/2013--drug-eluting stent proximal and origin of LM. 8/13/2019    July 15, 2013--successful drug-eluting stent PCI to the proximal and origin of the left main using vascular ultrasound guidance.   • History of stroke, questionable. 8/13/2019          • Hx of CABG, 2004--LIMA to LAD; RADHA to RCA; SVG to obtuse marginal; SVG to diagonal. 8/13/2019 December 2, 2015--cardiac catheterization: LIMA to LAD (patent); RADHA to RCA (occluded 100%); SVG to obtuse marginal (occluded 100%); SVG to diagonal (occluded 100%).  2004--four-vessel CABG.  LIMA to LAD; RADHA to RCA; SVG to obtuse marginal; SVG to diagonal.   • Hyperlipidemia 8/13/2019   • Macrocytosis 8/13/2019 July 25, 2019--MCV elevated at 102.2.  Vitamin B12 level was above normal at 1208.  Methylmalonic acid was not performed.   • Multiple pulmonary nodules, 1/29/2019--minimal change in clustered nodularity and tree-in-bud nodularity upper lobes and right middle lobe.  Recommend yearly low-dose CT screening.  8/13/2019 January 29, 2019--CT scan of the chest without contrast performed for abnormal CT scan reveals minimal change in clustered nodularity and tree-in-bud nodularity involving dominantly the upper lobes and right middle lobe, likely postinfectious or postinflammatory.  A few new areas of peripheral mucus plugging noted at the right lower lobe.  No new or enlarging pulmonary nodules.  Return to annual s   • Occlusive coronary artery disease, 12/2/2015--cardiac cath: LIMA to LAD (patent); RADHA to RCA (occluded 100%); SVG to obtuse marginal (occluded 100%); SVG to diagonal (occluded 100%).   8/13/2019 December 2, 2015--cardiac catheterization: LIMA to LAD (patent); RADHA to RCA (occluded 100%); SVG to obtuse marginal (occluded 100%); SVG to diagonal (occluded 100%).  July 15, 2013--successful drug-eluting stent PCI to the proximal and origin of the left main using vascular ultrasound guidance.  2004--four-vessel CABG.  LIMA to LAD; RADHA to RCA; SVG to obtuse marginal; SVG to diagonal.   HPI: Fra   • Proctalgia fugax 8/13/2019   • Pulmonary emphysema (CMS/HCC) 8/13/2019 January 29, 2019--CT scan of the chest without contrast performed for abnormal CT scan reveals minimal change in clustered nodularity and tree-in-bud nodularity involving dominantly the upper lobes and right middle lobe, likely postinfectious or postinflammatory.  A few new areas of peripheral mucus plugging noted at the right lower lobe.  No new or enlarging pulmonary nodules.  Return to annual s   • Vitamin D deficiency 8/13/2019         Past Surgical History:   Procedure Laterality Date   • ABDOMINAL AORTIC ANEURYSM REPAIR W/ ENDOLUMINAL GRAFT  06/23/2017 June 23, 2017--endovascular repair of AAA.   • CARDIAC CATHETERIZATION  12/02/2015 December 2, 2015--cardiac catheterization.  See past medical history for details.   • CATARACT EXTRACTION, BILATERAL     • COLONOSCOPY  2010 2010--colonoscopy revealed a serrated adenoma.   •  COLONOSCOPY W/ POLYPECTOMY  02/2016 February 2016--colonoscopy with polypectomy.  Pathology not known.   • CORONARY ANGIOPLASTY WITH STENT PLACEMENT  07/15/2013    July 15, 2013--successful drug-eluting stent PCI to the proximal and origin of the left main using vascular ultrasound guidance.   • CORONARY ARTERY BYPASS GRAFT  2004 2004--four-vessel CABG.  LIMA to LAD; RADHA to RCA; SVG to obtuse marginal; SVG to diagonal.   • PARS PLANA VITRECTOMY W/ ENDOPHOTOCOAGULATION  10/05/2016    October 5, 2016--vitrectomy, membrane peel, panretinal endophotocoagulation laser, right eye for preretinal membrane, vitreous hemorrhage, and posterior vitreous detachment of right eye.   • UPPER GASTROINTESTINAL ENDOSCOPY  02/2016 February 2016--EGD reportedly revealed esophagitis and gastritis.         Allergies   Allergen Reactions   • Codeine Hives   • Msg [Monosodium Glutamate] Other (See Comments)     Passes out   • Penicillins Hives           Current Outpatient Medications:   •  aspirin 325 MG tablet, Take 1 p.o. daily, Disp: , Rfl:   •  atorvastatin (LIPITOR) 80 MG tablet, Take 1 p.o. daily for high cholesterol, Disp: 90 tablet, Rfl: 0  •  chlorhexidine (PAROEX) 0.12 % solution, USE 15 MLS IN THE MOUTH OR THROAT TWO TIMES DAILY (DO NOT SWALLOW), Disp: , Rfl:   •  Cholecalciferol (VITAMIN D3) 5000 units capsule capsule, Take  by mouth., Disp: , Rfl:   •  clindamycin (CLINDAGEL) 1 % gel, Apply  topically to the appropriate area as directed 2 (Two) Times a Day. to affected area, Disp: 30 g, Rfl: 0  •  clopidogrel (PLAVIX) 75 MG tablet, Take 1 p.o. daily for blood thinner, Disp: 30 tablet, Rfl:   •  Cyanocobalamin (B-12) 1000 MCG tablet, Take 1 tablet by mouth Daily., Disp: , Rfl:   •  desonide (DESOWEN) 0.05 % cream, Apply  topically to the appropriate area as directed., Disp: , Rfl:   •  diazePAM (VALIUM) 5 MG tablet, take 1 p.o. daily as needed, Disp: 90 tablet, Rfl: 0  •  Ergocalciferol (VITAMIN D2) 2000 units tablet,  Take 1 tablet by mouth Daily., Disp: , Rfl:   •  esomeprazole (nexIUM) 40 MG capsule, Take 1 p.o. daily before the first meal for reflux, Disp: 90 capsule, Rfl: 0  •  ezetimibe (ZETIA) 10 MG tablet, Take 1 p.o. daily for high cholesterol, Disp: 90 tablet, Rfl: 1  •  fluorometholone (FML) 0.1 % ophthalmic suspension, INSTILL 1 DROP INTO EACH EYE TWICE DAILY, Disp: , Rfl: 5  •  fluticasone (FLONASE) 50 MCG/ACT nasal spray, 2 sprays each nostril daily as needed for allergic rhinitis, Disp: , Rfl:   •  podofilox (CONDYLOX) 0.5 % external solution, Apply  topically to the appropriate area as directed., Disp: , Rfl:   •  tamsulosin (FLOMAX) 0.4 MG capsule 24 hr capsule, Take 1 p.o. twice daily for prostate, Disp: 60 capsule, Rfl: 6  •  vitamin C (ASCORBIC ACID) 250 MG tablet, Take 250 mg by mouth Daily., Disp: , Rfl:       Family History   Problem Relation Age of Onset   • Diabetes type II Mother    • Coronary artery disease Father    • Diabetes type II Father    • Kidney disease Father    • Hypertension Father    • Crohn's disease Sister    • Coronary artery disease Brother         Brother  of a myocardial infarction at age 53         Social History     Socioeconomic History   • Marital status:      Spouse name: Not on file   • Number of children: Not on file   • Years of education: Not on file   • Highest education level: Not on file   Occupational History   • Occupation:    Social Needs   • Financial resource strain: Not hard at all   • Food insecurity:     Worry: Never true     Inability: Never true   • Transportation needs:     Medical: No     Non-medical: No   Tobacco Use   • Smoking status: Current Every Day Smoker     Packs/day: 0.50     Types: Cigarettes     Start date:    • Smokeless tobacco: Never Used   Substance and Sexual Activity   • Alcohol use: No     Frequency: Never   • Drug use: No   • Sexual activity: Not Currently     Partners: Female   Lifestyle   • Physical activity:      "Days per week: 7 days     Minutes per session: 20 min   • Stress: Only a little   Relationships   • Social connections:     Talks on phone: Patient refused     Gets together: Patient refused     Attends [employer] service: Patient refused     Active member of club or organization: Patient refused     Attends meetings of clubs or organizations: Patient refused     Relationship status: Patient refused         Vitals:    11/21/19 0947   BP: 116/60   BP Location: Left arm   Pulse: 66   SpO2: 98%   Weight: 83 kg (183 lb)   Height: 175.3 cm (69.02\")        Body mass index is 27.01 kg/m².      Physical Exam:    General: Alert and oriented x 3.  No acute distress.  Normal affect.  Overweight.  HEENT: Pupils equal, round, reactive to light; extraocular movements intact; sclerae nonicteric; pharynx, ear canals and TMs normal.  Neck: Without JVD, thyromegaly, bruit, or adenopathy.  Lungs: Clear to auscultation in all fields.  Heart: Regular rate and rhythm without murmur, rub, gallop, or click.  Abdomen: Soft, nontender, without hepatosplenomegaly or hernia.  Bowel sounds normal.  : Deferred.  Rectal: Deferred.  Extremities: Without clubbing, cyanosis, edema, or pulse deficit.  Neurologic: Intact without focal deficit.  Normal station and gait observed during ingress and egress from the examination room.  Skin: Without significant lesion.  Musculoskeletal: Unremarkable.    Lab/other results:    NMR reveals a total cholesterol 129.  Triglycerides 69.  LDL particle number slightly elevated 1065.  Small LDL particle number slightly elevated at 538.  HDL particle number is normal at 33.7.  CMP normal except potassium slightly elevated at 5.3.  CBC normal except MCV slightly elevated at 97.1.  Methylmalonic acid is normal.  Thyroid function test normal.  Vitamin D normal.  CPK normal.    Assessment/Plan:     Diagnosis Plan   1. Medicare annual wellness visit, subsequent     2. Hyperlipidemia, unspecified hyperlipidemia type     3. " Benign essential hypertension     4. Occlusive coronary artery disease, 12/2/2015--cardiac cath: LIMA to LAD (patent); RADHA to RCA (occluded 100%); SVG to obtuse marginal (occluded 100%); SVG to diagonal (occluded 100%).       5. Hx of CABG, 2004--LIMA to LAD; RADHA to RCA; SVG to obtuse marginal; SVG to diagonal.     6. History of abdominal aortic aneurysm (AAA), 6/23/2017--endovascular AAA repair with stent.     7. History of gout     8. Benign prostatic hyperplasia with weak urinary stream     9. History of stroke, questionable.     10. History of PTCA, 7/15/2013--drug-eluting stent proximal and origin of LM.     11. Pulmonary emphysema, unspecified emphysema type (CMS/HCC)     12. Multiple pulmonary nodules, 1/29/2019--minimal change in clustered nodularity and tree-in-bud nodularity upper lobes and right middle lobe.  Recommend yearly low-dose CT screening.     13. History of colon polyps     14. Gastroesophageal reflux disease with esophagitis     15. Proctalgia fugax     16. Macrocytosis     17. Vitamin D deficiency       The subsequent Medicare wellness visit is documented on separate note.    Patient has hyperlipidemia which is under excellent control which is particularly important given his occlusive coronary artery disease which seems stable.  He also has a history of AAA and had AAA endovascular repair with stent back in 2017.  He has a history of gout with no recurrence.  BPH symptoms are reasonably controlled.  Patient has a questionable history of stroke remotely.  However, patient did provide me with the report of an MRI of the brain back in 2003 which did confirm an infarct of the internal capsule.  He has pulmonary emphysema as well as pulmonary nodules and is recommended that he have yearly low-dose CT screening which will be due in January of next year.  He has a history of colon polyps but I do not have the pathology reports.  Reflux controlled with Nexium.  Patient continues to suffer from  intermittent proctalgia fugax.  Macrocytosis evaluation was negative for B12 deficiency.    Plan is as follows: No change in current medical regimen.  CT scan of the chest ordered.  Patient would like to have that done before the end of the year and I certainly agree.  Patient reports she is going to stop smoking.  Encouragement given.  No changes in current medical regimen.  I will have patient follow-up in 6 months with lab prior or follow-up as needed.    Procedures

## 2019-11-21 NOTE — PROGRESS NOTES
The ABCs of the Annual Wellness Visit  Subsequent Medicare Wellness Visit    No chief complaint on file.      Subjective   History of Present Illness:  Vernon Willis is a 68 y.o. male who presents for a Subsequent Medicare Wellness Visit.    HEALTH RISK ASSESSMENT    Recent Hospitalizations:  No hospitalization(s) within the last year.    Current Medical Providers:  Patient Care Team:  Ryan Hutchinson MD as PCP - General (Internal Medicine)    Smoking Status:  Social History     Tobacco Use   Smoking Status Current Every Day Smoker   • Packs/day: 0.50   • Types: Cigarettes   • Start date: 1970   Smokeless Tobacco Never Used       Alcohol Consumption:  Social History     Substance and Sexual Activity   Alcohol Use No   • Frequency: Never   • Binge frequency: Never       Depression Screen:   PHQ-2/PHQ-9 Depression Screening 11/21/2019   Little interest or pleasure in doing things 0   Feeling down, depressed, or hopeless 0   Total Score 0       Fall Risk Screen:  JERRY Fall Risk Assessment was completed, and patient is at LOW risk for falls.Assessment completed on:8/13/2019    Health Habits and Functional and Cognitive Screening:  Functional & Cognitive Status 11/21/2019   Do you have difficulty preparing food and eating? No   Do you have difficulty bathing yourself, getting dressed or grooming yourself? No   Do you have difficulty using the toilet? No   Do you have difficulty moving around from place to place? No   Do you have trouble with steps or getting out of a bed or a chair? No   Current Diet Well Balanced Diet   Dental Exam Up to date   Eye Exam Up to date   Exercise (times per week) 5 times per week   Current Exercise Activities Include Cardiovasular Workout on Exercise Equipment   Do you need help using the phone?  No   Are you deaf or do you have serious difficulty hearing?  No   Do you need help with transportation? No   Do you need help shopping? No   Do you need help preparing meals?  No   Do you  need help with housework?  No   Do you need help with laundry? No   Do you need help taking your medications? No   Do you need help managing money? No   Do you ever drive or ride in a car without wearing a seat belt? No   Have you felt unusual stress, anger or loneliness in the last month? No   Who do you live with? Alone   If you need help, do you have trouble finding someone available to you? No   Have you been bothered in the last four weeks by sexual problems? No   Do you have difficulty concentrating, remembering or making decisions? No         Does the patient have evidence of cognitive impairment? No    Asprin use counseling:Taking ASA appropriately as indicated    Age-appropriate Screening Schedule:  Refer to the list below for future screening recommendations based on patient's age, sex and/or medical conditions. Orders for these recommended tests are listed in the plan section. The patient has been provided with a written plan.    Health Maintenance   Topic Date Due   • LIPID PANEL  11/14/2020   • COLONOSCOPY  02/01/2026   • INFLUENZA VACCINE  Completed   • PNEUMOCOCCAL VACCINES (65+ LOW/MEDIUM RISK)  Completed   • TDAP/TD VACCINES  Discontinued   • ZOSTER VACCINE  Discontinued          The following portions of the patient's history were reviewed and updated as appropriate: allergies, current medications, past family history, past medical history, past social history, past surgical history and problem list.    Outpatient Medications Prior to Visit   Medication Sig Dispense Refill   • aspirin 81 MG tablet Take 81 mg by mouth Daily.     • atorvastatin (LIPITOR) 80 MG tablet Take 1 p.o. daily for high cholesterol 90 tablet 0   • chlorhexidine (PAROEX) 0.12 % solution USE 15 MLS IN THE MOUTH OR THROAT TWO TIMES DAILY (DO NOT SWALLOW)     • Cholecalciferol (VITAMIN D3) 5000 units capsule capsule Take  by mouth.     • clindamycin (CLINDAGEL) 1 % gel Apply  topically to the appropriate area as directed 2 (Two)  Times a Day. to affected area 30 g 0   • clopidogrel (PLAVIX) 75 MG tablet Take 1 p.o. daily for blood thinner 30 tablet    • Cyanocobalamin (B-12) 1000 MCG tablet Take 1 tablet by mouth Daily.     • desonide (DESOWEN) 0.05 % cream Apply  topically to the appropriate area as directed.     • diazePAM (VALIUM) 5 MG tablet take 1 p.o. daily as needed 90 tablet 0   • Ergocalciferol (VITAMIN D2) 2000 units tablet Take 1 tablet by mouth Daily.     • esomeprazole (nexIUM) 40 MG capsule Take 1 p.o. daily before the first meal for reflux 90 capsule 0   • ezetimibe (ZETIA) 10 MG tablet Take 1 p.o. daily for high cholesterol 90 tablet 1   • fluorometholone (FML) 0.1 % ophthalmic suspension INSTILL 1 DROP INTO EACH EYE TWICE DAILY  5   • fluticasone (FLONASE) 50 MCG/ACT nasal spray 2 sprays each nostril daily as needed for allergic rhinitis     • podofilox (CONDYLOX) 0.5 % external solution Apply  topically to the appropriate area as directed.     • tamsulosin (FLOMAX) 0.4 MG capsule 24 hr capsule Take 1 p.o. twice daily for prostate 60 capsule 6   • vitamin C (ASCORBIC ACID) 250 MG tablet Take 250 mg by mouth Daily.     • aspirin 325 MG tablet Take 1 p.o. daily       No facility-administered medications prior to visit.        Patient Active Problem List   Diagnosis   • History of abdominal aortic aneurysm (AAA), 6/23/2017--endovascular AAA repair with stent.   • Occlusive coronary artery disease, 12/2/2015--cardiac cath: LIMA to LAD (patent); RADHA to RCA (occluded 100%); SVG to obtuse marginal (occluded 100%); SVG to diagonal (occluded 100%).     • Hyperlipidemia   • History of gout   • Chronic allergic conjunctivitis   • Vitamin D deficiency   • Benign prostatic hyperplasia with weak urinary stream   • Therapeutic drug monitoring   • History of stroke   • History of PTCA, 7/15/2013--drug-eluting stent proximal and origin of LM.   • Hx of CABG, 2004--LIMA to LAD; RADHA to RCA; SVG to obtuse marginal; SVG to diagonal.   •  "Pulmonary emphysema (CMS/HCC)   • Multiple pulmonary nodules, 1/29/2019--minimal change in clustered nodularity and tree-in-bud nodularity upper lobes and right middle lobe.  Recommend yearly low-dose CT screening.   • History of colon polyps   • Genital warts   • Allergic rhinitis   • Gastroesophageal reflux disease with esophagitis   • Proctalgia fugax   • Cigarette nicotine dependence    • Macrocytosis       Advanced Care Planning:  Patient has an advance directive - a copy has been provided and is visible in patient header    Review of Systems   Genitourinary:        Slow weak stream.  Nocturia x3-4.   All other systems reviewed and are negative.      Compared to one year ago, the patient feels his physical health is better.  Compared to one year ago, the patient feels his mental health is the same.    Reviewed chart for potential of high risk medication in the elderly: yes  Reviewed chart for potential of harmful drug interactions in the elderly:yes    Objective         Vitals:    11/21/19 0947   BP: 116/60   BP Location: Left arm   Pulse: 66   SpO2: 98%   Weight: 83 kg (183 lb)   Height: 175.3 cm (69.02\")       Body mass index is 27.01 kg/m².  Discussed the patient's BMI with him. The BMI is above average; BMI management plan is completed.    Physical Exam  General: Alert and oriented x 3.  No acute distress.  Normal affect.  Overweight.  HEENT: Pupils equal, round, reactive to light; extraocular movements intact; sclerae nonicteric; pharynx, ear canals and TMs normal.  Neck: Without JVD, thyromegaly, bruit, or adenopathy.  Lungs: Clear to auscultation in all fields.  Heart: Regular rate and rhythm without murmur, rub, gallop, or click.  Abdomen: Soft, nontender, without hepatosplenomegaly or hernia.  Bowel sounds normal.  : Deferred.  Rectal: Deferred.  Extremities: Without clubbing, cyanosis, edema, or pulse deficit.  Neurologic: Intact without focal deficit.  Normal station and gait observed during " ingress and egress from the examination room.  Skin: Without significant lesion.  Musculoskeletal: Unremarkable.    Lab Results   Component Value Date    GLU 94 11/14/2019    CHLPL 129 11/14/2019    TRIG 69 11/14/2019        Assessment/Plan   Medicare Risks and Personalized Health Plan  CMS Preventative Services Quick Reference  Cardiovascular risk  Diabetic Lab Screening   Obesity/Overweight   Prostate Cancer Screening   Tobacco Use/Dependance (use dotphrase .tobaccocessation for documentation)    The above risks/problems have been discussed with the patient.  Pertinent information has been shared with the patient in the After Visit Summary.  Follow up plans and orders are seen below in the Assessment/Plan Section.    Diagnoses and all orders for this visit:    1. Medicare annual wellness visit, subsequent (Primary)    2. Hyperlipidemia, unspecified hyperlipidemia type  -     CK; Future  -     Comprehensive Metabolic Panel; Future  -     NMR LipoProfile; Future  -     TSH; Future  -     T4, Free; Future  -     T3, Free; Future    3. Benign essential hypertension    4. Occlusive coronary artery disease, 12/2/2015--cardiac cath: LIMA to LAD (patent); RADHA to RCA (occluded 100%); SVG to obtuse marginal (occluded 100%); SVG to diagonal (occluded 100%).      5. Hx of CABG, 2004--LIMA to LAD; RADHA to RCA; SVG to obtuse marginal; SVG to diagonal.    6. History of abdominal aortic aneurysm (AAA), 6/23/2017--endovascular AAA repair with stent.    7. History of gout  -     Uric Acid; Future    8. Benign prostatic hyperplasia with weak urinary stream    9. History of stroke, questionable.    10. History of PTCA, 7/15/2013--drug-eluting stent proximal and origin of LM.    11. Pulmonary emphysema, unspecified emphysema type (CMS/[facility])    12. Multiple pulmonary nodules, 1/29/2019--minimal change in clustered nodularity and tree-in-bud nodularity upper lobes and right middle lobe.  Recommend yearly low-dose CT screening.    13.  History of colon polyps    14. Gastroesophageal reflux disease with esophagitis    15. Proctalgia fugax    16. Macrocytosis  -     CBC (No Diff); Future    17. Vitamin D deficiency  -     Vitamin D 25 Hydroxy; Future    18. Need for hepatitis C screening test  -     Hepatitis C Antibody; Future      Follow Up:  No Follow-up on file.     An After Visit Summary and PPPS were given to the patient.

## 2019-11-26 ENCOUNTER — TELEPHONE (OUTPATIENT)
Dept: INTERNAL MEDICINE | Facility: CLINIC | Age: 69
End: 2019-11-26

## 2019-11-26 DIAGNOSIS — K21.00 GASTROESOPHAGEAL REFLUX DISEASE WITH ESOPHAGITIS: ICD-10-CM

## 2019-11-26 RX ORDER — ESOMEPRAZOLE MAGNESIUM 40 MG/1
CAPSULE, DELAYED RELEASE ORAL
Qty: 90 CAPSULE | Refills: 0 | Status: SHIPPED | OUTPATIENT
Start: 2019-11-26 | End: 2020-02-17

## 2019-11-26 NOTE — TELEPHONE ENCOUNTER
Regarding: Referral Request  Contact: [phone]  ----- Message from Mychart, Generic sent at 11/26/2019  7:02 AM EST -----    GOOD MORNING,,,,    DR HOLCOMB,,,,WANTED MY CT, OF NODULE IN RIGHT LUNG,,,TO BE DONE  IN DECEMBER, @ [facility],,,,,,IT HAS ALMOST BEEN A YEAR,   SINCE LAST CT,,,,,,THE PAST 4, ALL DONE @ [place],,,,,PROBABLY BEST, ALL  ARE @ SAME PLACE,,,,,FOR COMPARISIONS REASONS,,,,,,,PLEASE SCHEDULE,,,,,ANY  DAY BUT 12/11,,,,AM, IS ALWAYS BEST FOR ME,,,,,THANKS MUCH,,,,,ENJOY THANKSGIVING,,,,LEWIS

## 2019-12-04 RX ORDER — CLINDAMYCIN PHOSPHATE 10 MG/G
GEL TOPICAL
Qty: 30 G | Refills: 0 | Status: SHIPPED | OUTPATIENT
Start: 2019-12-04 | End: 2020-02-21 | Stop reason: SDUPTHER

## 2019-12-09 ENCOUNTER — TELEPHONE (OUTPATIENT)
Dept: INTERNAL MEDICINE | Facility: CLINIC | Age: 69
End: 2019-12-09

## 2019-12-11 DIAGNOSIS — J30.1 SEASONAL ALLERGIC RHINITIS DUE TO POLLEN: Chronic | ICD-10-CM

## 2019-12-11 RX ORDER — FLUTICASONE PROPIONATE 50 MCG
SPRAY, SUSPENSION (ML) NASAL
Start: 2019-12-11 | End: 2019-12-17 | Stop reason: SDUPTHER

## 2019-12-17 DIAGNOSIS — J30.1 SEASONAL ALLERGIC RHINITIS DUE TO POLLEN: Chronic | ICD-10-CM

## 2019-12-17 RX ORDER — FLUTICASONE PROPIONATE 50 MCG
SPRAY, SUSPENSION (ML) NASAL
Qty: 9.9 ML | Refills: 2 | Status: SHIPPED | OUTPATIENT
Start: 2019-12-17 | End: 2020-01-14 | Stop reason: SDUPTHER

## 2020-01-13 DIAGNOSIS — K59.4 PROCTALGIA FUGAX: ICD-10-CM

## 2020-01-13 RX ORDER — DIAZEPAM 5 MG/1
TABLET ORAL
Qty: 30 TABLET | Refills: 0 | Status: SHIPPED | OUTPATIENT
Start: 2020-01-13 | End: 2020-03-03

## 2020-01-14 DIAGNOSIS — J30.1 SEASONAL ALLERGIC RHINITIS DUE TO POLLEN: Chronic | ICD-10-CM

## 2020-01-14 RX ORDER — FLUTICASONE PROPIONATE 50 MCG
SPRAY, SUSPENSION (ML) NASAL
Qty: 9.9 ML | Refills: 2 | Status: SHIPPED | OUTPATIENT
Start: 2020-01-14 | End: 2020-07-07

## 2020-01-20 RX ORDER — EZETIMIBE 10 MG/1
TABLET ORAL
Qty: 90 TABLET | Refills: 0 | Status: SHIPPED | OUTPATIENT
Start: 2020-01-20 | End: 2020-04-08

## 2020-01-30 ENCOUNTER — TELEPHONE (OUTPATIENT)
Dept: INTERNAL MEDICINE | Facility: CLINIC | Age: 70
End: 2020-01-30

## 2020-02-04 ENCOUNTER — TELEPHONE (OUTPATIENT)
Dept: INTERNAL MEDICINE | Facility: CLINIC | Age: 70
End: 2020-02-04

## 2020-02-04 NOTE — TELEPHONE ENCOUNTER
----- Message from Vernon Lo sent at 2/4/2020 10:05 AM EST -----  Regarding: Referral Request  Contact: [phone]  GOOD MORNING, NICKI    LAST FRIDAY, EAST SCHEDULING, CALLED ABOUT CT,     THEY INQUIRED, IF I HAD DONE BEFORE,,,,,   TOLD THEM, IT WAS ANNUAL,,AND I HAD A SMALL NODULE,,SHOWN IN PRIORS,,    THEY SEEMED CONFUSED, ABOUT, USING LOW RADIATION, VS NORMAL CT    I DID NOT RECIEVE CALL BACK,,THEY WANTED TO TALK TO DR HOLCOMB,,,    PLEASE, TAKE CARE OF THIS,,,,,,THANKS,,,,VERNON LO  2/4/20

## 2020-02-11 ENCOUNTER — HOSPITAL ENCOUNTER (OUTPATIENT)
Dept: CT IMAGING | Facility: HOSPITAL | Age: 70
Discharge: HOME | End: 2020-02-11
Admitting: INTERNAL MEDICINE

## 2020-02-11 DIAGNOSIS — R91.8 MULTIPLE PULMONARY NODULES: Chronic | ICD-10-CM

## 2020-02-11 DIAGNOSIS — F17.218 NICOTINE DEPENDENCE, CIGARETTES, WITH OTHER NICOTINE-INDUCED DISORDERS: ICD-10-CM

## 2020-02-11 DIAGNOSIS — J43.9 PULMONARY EMPHYSEMA, UNSPECIFIED EMPHYSEMA TYPE (HCC): Chronic | ICD-10-CM

## 2020-02-11 PROCEDURE — G0297 LDCT FOR LUNG CA SCREEN: HCPCS

## 2020-02-17 DIAGNOSIS — K21.00 GASTROESOPHAGEAL REFLUX DISEASE WITH ESOPHAGITIS: ICD-10-CM

## 2020-02-17 RX ORDER — ESOMEPRAZOLE MAGNESIUM 40 MG/1
CAPSULE, DELAYED RELEASE ORAL
Qty: 90 CAPSULE | Refills: 0 | Status: SHIPPED | OUTPATIENT
Start: 2020-02-17 | End: 2020-05-04

## 2020-02-21 RX ORDER — CLINDAMYCIN PHOSPHATE 10 MG/G
GEL TOPICAL 2 TIMES DAILY
Qty: 30 G | Refills: 0 | Status: SHIPPED | OUTPATIENT
Start: 2020-02-21 | End: 2020-03-30 | Stop reason: SDUPTHER

## 2020-02-24 ENCOUNTER — LAB (OUTPATIENT)
Dept: INTERNAL MEDICINE | Facility: CLINIC | Age: 70
End: 2020-02-24

## 2020-02-24 DIAGNOSIS — Z11.59 NEED FOR HEPATITIS C SCREENING TEST: ICD-10-CM

## 2020-02-25 LAB — HCV AB S/CO SERPL IA: <0.1 S/CO RATIO (ref 0–0.9)

## 2020-03-02 DIAGNOSIS — K59.4 PROCTALGIA FUGAX: ICD-10-CM

## 2020-03-03 RX ORDER — DIAZEPAM 5 MG/1
TABLET ORAL
Qty: 30 TABLET | Refills: 0 | OUTPATIENT
Start: 2020-03-03 | End: 2020-04-21

## 2020-03-30 RX ORDER — CLINDAMYCIN PHOSPHATE 10 MG/G
GEL TOPICAL 2 TIMES DAILY
Qty: 60 G | Refills: 0 | Status: SHIPPED | OUTPATIENT
Start: 2020-03-30 | End: 2020-06-01 | Stop reason: SDUPTHER

## 2020-04-08 RX ORDER — EZETIMIBE 10 MG/1
TABLET ORAL
Qty: 90 TABLET | Refills: 0 | Status: SHIPPED | OUTPATIENT
Start: 2020-04-08 | End: 2020-06-30

## 2020-04-21 DIAGNOSIS — K59.4 PROCTALGIA FUGAX: ICD-10-CM

## 2020-04-21 RX ORDER — DIAZEPAM 5 MG/1
TABLET ORAL
Qty: 30 TABLET | Refills: 5 | Status: SHIPPED | OUTPATIENT
Start: 2020-04-21 | End: 2020-11-23

## 2020-04-23 ENCOUNTER — TELEPHONE (OUTPATIENT)
Dept: INTERNAL MEDICINE | Facility: CLINIC | Age: 70
End: 2020-04-23

## 2020-04-23 DIAGNOSIS — N40.1 BENIGN PROSTATIC HYPERPLASIA WITH WEAK URINARY STREAM: Primary | Chronic | ICD-10-CM

## 2020-04-23 DIAGNOSIS — Z87.39 HISTORY OF GOUT: Chronic | ICD-10-CM

## 2020-04-23 DIAGNOSIS — Z12.5 SCREENING PSA (PROSTATE SPECIFIC ANTIGEN): ICD-10-CM

## 2020-04-23 DIAGNOSIS — D75.89 MACROCYTOSIS: Chronic | ICD-10-CM

## 2020-04-23 DIAGNOSIS — E78.5 HYPERLIPIDEMIA, UNSPECIFIED HYPERLIPIDEMIA TYPE: Chronic | ICD-10-CM

## 2020-04-23 DIAGNOSIS — E55.9 VITAMIN D DEFICIENCY: Chronic | ICD-10-CM

## 2020-04-23 DIAGNOSIS — R39.12 BENIGN PROSTATIC HYPERPLASIA WITH WEAK URINARY STREAM: Primary | Chronic | ICD-10-CM

## 2020-04-23 NOTE — TELEPHONE ENCOUNTER
Patient called stating that he has labs and a visit to see Dr. Hutchinson in May.  He wanted to know if he could also have a PSA and a urine culture done so that he does not need to see his urologist this year.  Please advise.

## 2020-05-01 RX ORDER — ATORVASTATIN CALCIUM 80 MG/1
TABLET, FILM COATED ORAL
Qty: 90 TABLET | Refills: 0 | Status: SHIPPED | OUTPATIENT
Start: 2020-05-01 | End: 2020-08-19

## 2020-05-04 DIAGNOSIS — K21.00 GASTROESOPHAGEAL REFLUX DISEASE WITH ESOPHAGITIS: ICD-10-CM

## 2020-05-04 RX ORDER — ESOMEPRAZOLE MAGNESIUM 40 MG/1
CAPSULE, DELAYED RELEASE ORAL
Qty: 90 CAPSULE | Refills: 0 | Status: SHIPPED | OUTPATIENT
Start: 2020-05-04 | End: 2020-07-27

## 2020-05-12 DIAGNOSIS — N40.1 BENIGN PROSTATIC HYPERPLASIA WITH WEAK URINARY STREAM: Chronic | ICD-10-CM

## 2020-05-12 DIAGNOSIS — Z12.5 SCREENING PSA (PROSTATE SPECIFIC ANTIGEN): ICD-10-CM

## 2020-05-12 DIAGNOSIS — R39.12 BENIGN PROSTATIC HYPERPLASIA WITH WEAK URINARY STREAM: Chronic | ICD-10-CM

## 2020-05-14 ENCOUNTER — APPOINTMENT (OUTPATIENT)
Dept: LAB | Facility: HOSPITAL | Age: 70
End: 2020-05-14

## 2020-05-14 LAB
25(OH)D3 SERPL-MCNC: 59.4 NG/ML (ref 30–100)
ALBUMIN SERPL-MCNC: 4.3 G/DL (ref 3.5–5.2)
ALBUMIN/GLOB SERPL: 1.5 G/DL
ALP SERPL-CCNC: 84 U/L (ref 39–117)
ALT SERPL W P-5'-P-CCNC: 21 U/L (ref 1–41)
ANION GAP SERPL CALCULATED.3IONS-SCNC: 11.3 MMOL/L (ref 5–15)
AST SERPL-CCNC: 26 U/L (ref 1–40)
BILIRUB SERPL-MCNC: 0.4 MG/DL (ref 0.2–1.2)
BILIRUB UR QL STRIP: NEGATIVE
BUN BLD-MCNC: 15 MG/DL (ref 8–23)
BUN/CREAT SERPL: 15 (ref 7–25)
CALCIUM SPEC-SCNC: 9.9 MG/DL (ref 8.6–10.5)
CHLORIDE SERPL-SCNC: 98 MMOL/L (ref 98–107)
CK SERPL-CCNC: 182 U/L (ref 20–200)
CLARITY UR: CLEAR
CO2 SERPL-SCNC: 28.7 MMOL/L (ref 22–29)
COLOR UR: YELLOW
CREAT BLD-MCNC: 1 MG/DL (ref 0.76–1.27)
DEPRECATED RDW RBC AUTO: 44 FL (ref 37–54)
ERYTHROCYTE [DISTWIDTH] IN BLOOD BY AUTOMATED COUNT: 12.2 % (ref 12.3–15.4)
GFR SERPL CREATININE-BSD FRML MDRD: 74 ML/MIN/1.73
GLOBULIN UR ELPH-MCNC: 2.8 GM/DL
GLUCOSE BLD-MCNC: 108 MG/DL (ref 65–99)
GLUCOSE UR STRIP-MCNC: NEGATIVE MG/DL
HCT VFR BLD AUTO: 40.6 % (ref 37.5–51)
HGB BLD-MCNC: 13.6 G/DL (ref 13–17.7)
HGB UR QL STRIP.AUTO: NEGATIVE
KETONES UR QL STRIP: NEGATIVE
LEUKOCYTE ESTERASE UR QL STRIP.AUTO: NEGATIVE
MCH RBC QN AUTO: 32.5 PG (ref 26.6–33)
MCHC RBC AUTO-ENTMCNC: 33.5 G/DL (ref 31.5–35.7)
MCV RBC AUTO: 97.1 FL (ref 79–97)
NITRITE UR QL STRIP: NEGATIVE
PH UR STRIP.AUTO: 6.5 [PH] (ref 5–8)
PLATELET # BLD AUTO: 191 10*3/MM3 (ref 140–450)
PMV BLD AUTO: 11.9 FL (ref 6–12)
POTASSIUM BLD-SCNC: 4.9 MMOL/L (ref 3.5–5.2)
PROT SERPL-MCNC: 7.1 G/DL (ref 6–8.5)
PROT UR QL STRIP: NEGATIVE
PSA SERPL-MCNC: 0.31 NG/ML (ref 0–4)
RBC # BLD AUTO: 4.18 10*6/MM3 (ref 4.14–5.8)
SODIUM BLD-SCNC: 138 MMOL/L (ref 136–145)
SP GR UR STRIP: 1.02 (ref 1–1.03)
T3FREE SERPL-MCNC: 3.64 PG/ML (ref 2–4.4)
T4 FREE SERPL-MCNC: 1.22 NG/DL (ref 0.93–1.7)
TSH SERPL DL<=0.05 MIU/L-ACNC: 1.01 UIU/ML (ref 0.27–4.2)
URATE SERPL-MCNC: 6.2 MG/DL (ref 3.4–7)
UROBILINOGEN UR QL STRIP: NORMAL
WBC NRBC COR # BLD: 7.01 10*3/MM3 (ref 3.4–10.8)

## 2020-05-14 PROCEDURE — 84481 FREE ASSAY (FT-3): CPT | Performed by: INTERNAL MEDICINE

## 2020-05-14 PROCEDURE — 84443 ASSAY THYROID STIM HORMONE: CPT | Performed by: INTERNAL MEDICINE

## 2020-05-14 PROCEDURE — 80053 COMPREHEN METABOLIC PANEL: CPT | Performed by: INTERNAL MEDICINE

## 2020-05-14 PROCEDURE — 82306 VITAMIN D 25 HYDROXY: CPT | Performed by: INTERNAL MEDICINE

## 2020-05-14 PROCEDURE — 81003 URINALYSIS AUTO W/O SCOPE: CPT | Performed by: INTERNAL MEDICINE

## 2020-05-14 PROCEDURE — 80061 LIPID PANEL: CPT | Performed by: INTERNAL MEDICINE

## 2020-05-14 PROCEDURE — 83704 LIPOPROTEIN BLD QUAN PART: CPT | Performed by: INTERNAL MEDICINE

## 2020-05-14 PROCEDURE — 82550 ASSAY OF CK (CPK): CPT | Performed by: INTERNAL MEDICINE

## 2020-05-14 PROCEDURE — 85027 COMPLETE CBC AUTOMATED: CPT | Performed by: INTERNAL MEDICINE

## 2020-05-14 PROCEDURE — 84550 ASSAY OF BLOOD/URIC ACID: CPT | Performed by: INTERNAL MEDICINE

## 2020-05-14 PROCEDURE — G0103 PSA SCREENING: HCPCS | Performed by: INTERNAL MEDICINE

## 2020-05-14 PROCEDURE — 36415 COLL VENOUS BLD VENIPUNCTURE: CPT

## 2020-05-14 PROCEDURE — 84439 ASSAY OF FREE THYROXINE: CPT | Performed by: INTERNAL MEDICINE

## 2020-05-15 LAB
CHOLEST SERPL-MCNC: 127 MG/DL (ref 100–199)
HDL SERPL-SCNC: 32.5 UMOL/L
HDLC SERPL-MCNC: 42 MG/DL
LDL-P: 916 NMOL/L
LDLC REAL SIZE PAT SERPL: 20.2 NM
LDLC SERPL CALC-MCNC: 68 MG/DL (ref 0–99)
SMALL LDL-P: 474 NMOL/L
TRIGL SERPL-MCNC: 87 MG/DL (ref 0–149)

## 2020-05-21 ENCOUNTER — OFFICE VISIT (OUTPATIENT)
Dept: INTERNAL MEDICINE | Facility: CLINIC | Age: 70
End: 2020-05-21

## 2020-05-21 DIAGNOSIS — R73.01 IMPAIRED FASTING GLUCOSE: ICD-10-CM

## 2020-05-21 DIAGNOSIS — K21.00 GASTROESOPHAGEAL REFLUX DISEASE WITH ESOPHAGITIS: Chronic | ICD-10-CM

## 2020-05-21 DIAGNOSIS — E55.9 VITAMIN D DEFICIENCY: Chronic | ICD-10-CM

## 2020-05-21 DIAGNOSIS — R91.8 MULTIPLE PULMONARY NODULES: Chronic | ICD-10-CM

## 2020-05-21 DIAGNOSIS — D75.89 MACROCYTOSIS: Chronic | ICD-10-CM

## 2020-05-21 DIAGNOSIS — E78.2 MIXED HYPERLIPIDEMIA: Primary | Chronic | ICD-10-CM

## 2020-05-21 DIAGNOSIS — E78.2 MIXED HYPERLIPIDEMIA: Chronic | ICD-10-CM

## 2020-05-21 DIAGNOSIS — J43.1 PANLOBULAR EMPHYSEMA (HCC): Chronic | ICD-10-CM

## 2020-05-21 DIAGNOSIS — Z95.1 HX OF CABG: Chronic | ICD-10-CM

## 2020-05-21 DIAGNOSIS — K59.4 PROCTALGIA FUGAX: Chronic | ICD-10-CM

## 2020-05-21 DIAGNOSIS — Z86.79 HISTORY OF ABDOMINAL AORTIC ANEURYSM (AAA): Chronic | ICD-10-CM

## 2020-05-21 DIAGNOSIS — N40.1 BENIGN PROSTATIC HYPERPLASIA WITH WEAK URINARY STREAM: Chronic | ICD-10-CM

## 2020-05-21 DIAGNOSIS — Z87.39 HISTORY OF GOUT: Chronic | ICD-10-CM

## 2020-05-21 DIAGNOSIS — Z86.010 HISTORY OF COLON POLYPS: Chronic | ICD-10-CM

## 2020-05-21 DIAGNOSIS — R39.12 BENIGN PROSTATIC HYPERPLASIA WITH WEAK URINARY STREAM: Chronic | ICD-10-CM

## 2020-05-21 DIAGNOSIS — I25.10 OCCLUSIVE CORONARY ARTERY DISEASE: Chronic | ICD-10-CM

## 2020-05-21 DIAGNOSIS — Z51.81 THERAPEUTIC DRUG MONITORING: ICD-10-CM

## 2020-05-21 DIAGNOSIS — Z98.61 HISTORY OF PTCA: Chronic | ICD-10-CM

## 2020-05-21 DIAGNOSIS — Z86.73 HISTORY OF STROKE: Chronic | ICD-10-CM

## 2020-05-21 PROBLEM — F17.210 CIGARETTE NICOTINE DEPENDENCE WITHOUT COMPLICATION: Status: RESOLVED | Noted: 2019-08-13 | Resolved: 2020-05-21

## 2020-05-21 PROCEDURE — 99214 OFFICE O/P EST MOD 30 MIN: CPT | Performed by: INTERNAL MEDICINE

## 2020-05-21 RX ORDER — B-COMPLEX WITH VITAMIN C
TABLET ORAL
COMMUNITY
Start: 2019-12-01

## 2020-05-21 RX ORDER — METOPROLOL SUCCINATE 50 MG/1
TABLET, EXTENDED RELEASE ORAL
COMMUNITY
Start: 2020-03-18

## 2020-05-21 RX ORDER — MULTIVIT WITH MINERALS/LUTEIN
TABLET ORAL
COMMUNITY
Start: 2019-12-01

## 2020-05-21 RX ORDER — MULTIVITAMIN WITH IRON
TABLET ORAL DAILY
COMMUNITY
Start: 2019-12-01

## 2020-05-21 RX ORDER — METOPROLOL SUCCINATE 25 MG/1
TABLET, EXTENDED RELEASE ORAL
COMMUNITY
Start: 2020-05-01

## 2020-05-21 RX ORDER — ERYTHROMYCIN 5 MG/G
OINTMENT OPHTHALMIC
COMMUNITY
Start: 2020-04-27

## 2020-05-21 NOTE — PROGRESS NOTES
05/21/2020    Patient Information  Vernon Willis                                                                                          [address]  [facility] [address]      1950  [unfilled]  There is no work phone number on file.    Chief Complaint:     Follow-up recent lab work in order to monitor several chronic medical issues listed in history of present illness.    Video visit patient gave verbal consent and actively and willingly participated.  25 minutes was spent evaluation of patient today    History of Present Illness:    Patient with a history of hyperlipidemia, occlusive coronary artery disease and history of CABG as well as PTCA, history of AAA, history of gout, BPH, history of stroke, multiple pulmonary nodules and emphysema, history of colon polyps, esophageal reflux, proctalgia fugax, macrocytosis, vitamin D deficiency.  He presents today with our lab in order to monitor his chronic medical issues.  No new acute complaints.  His past medical history reviewed and updated were necessary including health maintenance parameters.  This reveals he is up-to-date or else accounted for.    Review of Systems   Constitution: Negative.   HENT: Negative.    Eyes: Negative.    Cardiovascular: Negative.    Respiratory: Negative.    Endocrine: Negative.    Hematologic/Lymphatic: Negative.    Skin: Negative.    Musculoskeletal: Negative.    Gastrointestinal: Negative.         Periodic anorectal pain.   Genitourinary: Negative.    Neurological: Negative.    Psychiatric/Behavioral: Negative.    Allergic/Immunologic: Negative.        Active Problems:    Patient Active Problem List   Diagnosis   • History of abdominal aortic aneurysm (AAA), 6/23/2017--endovascular AAA repair with stent.   • Occlusive coronary artery disease, 12/2/2015--cardiac cath: LIMA to LAD (patent); RADHA to RCA (occluded 100%); SVG to obtuse marginal (occluded 100%); SVG to diagonal (occluded 100%).     • Hyperlipidemia    • History of gout   • Chronic allergic conjunctivitis   • Vitamin D deficiency   • Benign prostatic hyperplasia with weak urinary stream   • Therapeutic drug monitoring   • History of stroke   • History of PTCA, 7/15/2013--drug-eluting stent proximal and origin of LM.   • Hx of CABG, 2004--LIMA to LAD; RADHA to RCA; SVG to obtuse marginal; SVG to diagonal.   • Pulmonary emphysema (CMS/HCC)   • Multiple pulmonary nodules, 1/29/2019--minimal change in clustered nodularity and tree-in-bud nodularity upper lobes and right middle lobe.  Recommend yearly low-dose CT screening.   • History of colon polyps   • Genital warts   • Allergic rhinitis   • Gastroesophageal reflux disease with esophagitis   • Proctalgia fugax   • Macrocytosis   • Impaired fasting glucose         Past Medical History:   Diagnosis Date   • Abnormal CT of the chest 8/13/2019 January 29, 2019--CT scan of the chest without contrast performed for abnormal CT scan reveals minimal change in clustered nodularity and tree-in-bud nodularity involving dominantly the upper lobes and right middle lobe, likely postinfectious or postinflammatory.  A few new areas of peripheral mucus plugging noted at the right lower lobe.  No new or enlarging pulmonary nodules.  Return to annual s   • Allergic rhinitis 8/13/2019   • Benign prostatic hyperplasia with weak urinary stream 8/13/2019   • Chronic allergic conjunctivitis 8/13/2019   • Cigarette nicotine dependence  8/13/2019   • Gastroesophageal reflux disease with esophagitis 8/13/2019   • Genital warts 8/13/2019   • History of abdominal aortic aneurysm (AAA), 6/23/2017--endovascular AAA repair with stent. 8/13/2019 August 1, 2017--CTA of the abdomen: There has been endovascular repair of the abdominal aortic aneurysm with stent extending from just below the origin of the left renal vein into both common iliac arteries. No evidence for endoleak on arterial phase imaging. The excluded aneurysmal sac measures 4.8  cm in maximal diameter, previously 5.0 cm. Aortoiliac atherosclerosis with moderate narrowing at th   • History of cardiac catheterization 8/13/2019 December 2, 2015--cardiac catheterization.  INDICATION(S): This is a 64-year-old male with a history of coronary artery disease status post coronary artery bypass grafting. He presented to Dr. Clarke with symptoms consistent with his prior angina. He underwent a nuclear stress test that showed a mid-anterior wall myocardial infarction with a small amount of ischemia in the ventricular apex. He was s   • History of colon polyps 8/13/2019 February 2016--colonoscopy with polypectomy.  Pathology not known.  2010--colonoscopy revealed a serrated adenoma.   • History of gout 8/13/2019   • History of posterior vitreous detachment of right eye, 10/15/2016--repair. 8/13/2019 October 5, 2016--vitrectomy, membrane peel, panretinal endophotocoagulation laser, right eye for preretinal membrane, vitreous hemorrhage, and posterior vitreous detachment of right eye.        • History of PTCA, 7/15/2013--drug-eluting stent proximal and origin of LM. 8/13/2019    July 15, 2013--successful drug-eluting stent PCI to the proximal and origin of the left main using vascular ultrasound guidance.   • History of stroke, questionable. 8/13/2019          • Hx of CABG, 2004--LIMA to LAD; RADHA to RCA; SVG to obtuse marginal; SVG to diagonal. 8/13/2019 December 2, 2015--cardiac catheterization: LIMA to LAD (patent); RADHA to RCA (occluded 100%); SVG to obtuse marginal (occluded 100%); SVG to diagonal (occluded 100%).  2004--four-vessel CABG.  LIMA to LAD; RADHA to RCA; SVG to obtuse marginal; SVG to diagonal.   • Hyperlipidemia 8/13/2019   • Impaired fasting glucose 5/21/2020   • Macrocytosis 8/13/2019 July 25, 2019--MCV elevated at 102.2.  Vitamin B12 level was above normal at 1208.  Methylmalonic acid was not performed.   • Multiple pulmonary nodules, 1/29/2019--minimal change in clustered  nodularity and tree-in-bud nodularity upper lobes and right middle lobe.  Recommend yearly low-dose CT screening. 8/13/2019 January 29, 2019--CT scan of the chest without contrast performed for abnormal CT scan reveals minimal change in clustered nodularity and tree-in-bud nodularity involving dominantly the upper lobes and right middle lobe, likely postinfectious or postinflammatory.  A few new areas of peripheral mucus plugging noted at the right lower lobe.  No new or enlarging pulmonary nodules.  Return to annual s   • Occlusive coronary artery disease, 12/2/2015--cardiac cath: LIMA to LAD (patent); RADHA to RCA (occluded 100%); SVG to obtuse marginal (occluded 100%); SVG to diagonal (occluded 100%).   8/13/2019 December 2, 2015--cardiac catheterization: LIMA to LAD (patent); RADHA to RCA (occluded 100%); SVG to obtuse marginal (occluded 100%); SVG to diagonal (occluded 100%).  July 15, 2013--successful drug-eluting stent PCI to the proximal and origin of the left main using vascular ultrasound guidance.  2004--four-vessel CABG.  LIMA to LAD; RADHA to RCA; SVG to obtuse marginal; SVG to diagonal.   HPI: Fra   • Proctalgia fugax 8/13/2019   • Pulmonary emphysema (CMS/HCC) 8/13/2019 January 29, 2019--CT scan of the chest without contrast performed for abnormal CT scan reveals minimal change in clustered nodularity and tree-in-bud nodularity involving dominantly the upper lobes and right middle lobe, likely postinfectious or postinflammatory.  A few new areas of peripheral mucus plugging noted at the right lower lobe.  No new or enlarging pulmonary nodules.  Return to annual s   • Vitamin D deficiency 8/13/2019         Past Surgical History:   Procedure Laterality Date   • ABDOMINAL AORTIC ANEURYSM REPAIR W/ ENDOLUMINAL GRAFT  06/23/2017 June 23, 2017--endovascular repair of AAA.   • CARDIAC CATHETERIZATION  12/02/2015 December 2, 2015--cardiac catheterization.  See past medical history for details.   •  CATARACT EXTRACTION, BILATERAL     • COLONOSCOPY  2010 2010--colonoscopy revealed a serrated adenoma.   • COLONOSCOPY W/ POLYPECTOMY  02/2016 February 2016--colonoscopy with polypectomy.  Pathology not known.   • CORONARY ANGIOPLASTY WITH STENT PLACEMENT  07/15/2013    July 15, 2013--successful drug-eluting stent PCI to the proximal and origin of the left main using vascular ultrasound guidance.   • CORONARY ARTERY BYPASS GRAFT  2004 2004--four-vessel CABG.  LIMA to LAD; RADHA to RCA; SVG to obtuse marginal; SVG to diagonal.   • PARS PLANA VITRECTOMY W/ ENDOPHOTOCOAGULATION  10/05/2016    October 5, 2016--vitrectomy, membrane peel, panretinal endophotocoagulation laser, right eye for preretinal membrane, vitreous hemorrhage, and posterior vitreous detachment of right eye.   • UPPER GASTROINTESTINAL ENDOSCOPY  02/2016 February 2016--EGD reportedly revealed esophagitis and gastritis.         Allergies   Allergen Reactions   • Codeine Hives   • Msg [Monosodium Glutamate] Other (See Comments)     Passes out   • Penicillins Hives           Current Outpatient Medications:   •  ascorbic acid (VITAMIN C) 1000 MG tablet, , Disp: , Rfl:   •  aspirin 81 MG tablet, Take 81 mg by mouth Daily., Disp: , Rfl:   •  atorvastatin (LIPITOR) 80 MG tablet, TAKE 1 TABLET EVERY DAY FOR HIGH CHOLESTEROL, Disp: 90 tablet, Rfl: 0  •  chlorhexidine (PAROEX) 0.12 % solution, USE 15 MLS IN THE MOUTH OR THROAT TWO TIMES DAILY (DO NOT SWALLOW), Disp: , Rfl:   •  Cholecalciferol (VITAMIN D3) 5000 units capsule capsule, Take  by mouth., Disp: , Rfl:   •  clindamycin (CLINDAGEL) 1 % gel, Apply  topically to the appropriate area as directed 2 (Two) Times a Day., Disp: 60 g, Rfl: 0  •  clopidogrel (PLAVIX) 75 MG tablet, Take 1 p.o. daily for blood thinner, Disp: 30 tablet, Rfl:   •  Cyanocobalamin (B-12) 1000 MCG tablet, Take 1 tablet by mouth Daily., Disp: , Rfl:   •  desonide (DESOWEN) 0.05 % cream, Apply  topically to the appropriate area  as directed., Disp: , Rfl:   •  diazePAM (VALIUM) 5 MG tablet, TAKE ONE TABLET BY MOUTH DAILY AS NEEDED, Disp: 30 tablet, Rfl: 5  •  Ergocalciferol (VITAMIN D2) 2000 units tablet, Take 1 tablet by mouth Daily., Disp: , Rfl:   •  erythromycin (ROMYCIN) 5 MG/GM ophthalmic ointment, PUT ON QTIP AND APPLY TO EYELID AT BEDTIME, Disp: , Rfl:   •  esomeprazole (nexIUM) 40 MG capsule, TAKE ONE CAPSULE BY MOUTH DAILY BEFORE THE FIRST MEAL FOR REFLUX, Disp: 90 capsule, Rfl: 0  •  ezetimibe (ZETIA) 10 MG tablet, TAKE ONE TABLET BY MOUTH DAILY FOR HIGH CHOLESTEROL, Disp: 90 tablet, Rfl: 0  •  fluorometholone (FML) 0.1 % ophthalmic suspension, INSTILL 1 DROP INTO EACH EYE TWICE DAILY, Disp: , Rfl: 5  •  fluticasone (FLONASE) 50 MCG/ACT nasal spray, 2 sprays each nostril daily as needed for allergic rhinitis, Disp: 9.9 mL, Rfl: 2  •  hydrocortisone (PROCTOZONE-HC) 2.5 % rectal cream, Insert  into the rectum 2 (Two) Times a Day., Disp: 28.35 g, Rfl: 1  •  hydrocortisone 2.5 % cream, INSERT RECTALLY TWICE A DAY, Disp: 28.35 g, Rfl: 1  •  metoprolol succinate XL (TOPROL-XL) 25 MG 24 hr tablet, TAKE 1 TABLET BY MOUTH DAILY ALONG WITH THE 50MG TABLET AS NEEDED FOR PALPITATIONS., Disp: , Rfl:   •  metoprolol succinate XL (TOPROL-XL) 50 MG 24 hr tablet, , Disp: , Rfl:   •  podofilox (CONDYLOX) 0.5 % external solution, Apply  topically to the appropriate area as directed., Disp: , Rfl:   •  Sod Fluoride-Potassium Nitrate 1.1-5 % gel, Apply 100 mL to teeth 2 (Two) Times a Day., Disp: 112 g, Rfl: 1  •  tamsulosin (FLOMAX) 0.4 MG capsule 24 hr capsule, Take 1 p.o. twice daily for prostate, Disp: 60 capsule, Rfl: 6  •  Vitamin A 2400 MCG (8000 UT) capsule, Take  by mouth Daily., Disp: , Rfl:   •  vitamin C (ASCORBIC ACID) 250 MG tablet, Take 250 mg by mouth Daily., Disp: , Rfl:   •  Zinc 100 MG tablet, , Disp: , Rfl:       Family History   Problem Relation Age of Onset   • Diabetes type II Mother    • Coronary artery disease Father    •  Diabetes type II Father    • Kidney disease Father    • Hypertension Father    • Crohn's disease Sister    • Coronary artery disease Brother         Brother  of a myocardial infarction at age 53         Social History     Socioeconomic History   • Marital status:      Spouse name: Not on file   • Number of children: Not on file   • Years of education: Not on file   • Highest education level: Not on file   Occupational History   • Occupation:    Social Needs   • Financial resource strain: Not hard at all   • Food insecurity:     Worry: Never true     Inability: Never true   • Transportation needs:     Medical: No     Non-medical: No   Tobacco Use   • Smoking status: Former Smoker     Packs/day: 0.50     Types: Cigarettes     Start date:      Last attempt to quit: 2020     Years since quittin.3   • Smokeless tobacco: Never Used   Substance and Sexual Activity   • Alcohol use: No     Frequency: Never     Binge frequency: Never   • Drug use: No   • Sexual activity: Not Currently     Partners: Female   Lifestyle   • Physical activity:     Days per week: 7 days     Minutes per session: 20 min   • Stress: Only a little   Relationships   • Social connections:     Talks on phone: Patient refused     Gets together: Patient refused     Attends Sikhism service: Patient refused     Active member of club or organization: Patient refused     Attends meetings of clubs or organizations: Patient refused     Relationship status: Patient refused         There were no vitals filed for this visit.     There is no height or weight on file to calculate BMI.      Physical Exam:    Video visit.  Physical examination and vital signs not performed.  Patient did appear well and healthy per video.    Lab/other results:     NMR reveals a total cholesterol 127.  Triglycerides 87.  LDL particle number border 916.  Small LDL particle number elevated at 474.  HDL particle number normal at 32.5.  CMP normal except  glucose 108.  CPK normal.  CBC normal except MCV elevated at 97.1 .  Urinalysis normal.  PSA normal at 0.306.  Thyroid function test normal.  Uric acid normal at 6.2.  Vitamin D normal at 59.4.    Assessment/Plan:     Diagnosis Plan   1. Hyperlipidemia  CK    Comprehensive Metabolic Panel    NMR LipoProfile    TSH    T4, Free    T3, Free   2. Occlusive coronary artery disease, 12/2/2015--cardiac cath: LIMA to LAD (patent); RADHA to RCA (occluded 100%); SVG to obtuse marginal (occluded 100%); SVG to diagonal (occluded 100%).       3. History of abdominal aortic aneurysm (AAA), 6/23/2017--endovascular AAA repair with stent.  CT angiogram abdomen w wo contrast   4. History of gout     5. Benign prostatic hyperplasia with weak urinary stream     6. History of stroke     7. History of PTCA, 7/15/2013--drug-eluting stent proximal and origin of LM.     8. Hx of CABG, 2004--LIMA to LAD; RADHA to RCA; SVG to obtuse marginal; SVG to diagonal.     9. Panlobular emphysema (CMS/HCC)     10. Multiple pulmonary nodules, 1/29/2019--minimal change in clustered nodularity and tree-in-bud nodularity upper lobes and right middle lobe.  Recommend yearly low-dose CT screening.     11. History of colon polyps     12. Gastroesophageal reflux disease with esophagitis     13. Proctalgia fugax     14. Macrocytosis  CBC (No Diff)   15. Vitamin D deficiency  Vitamin D 25 Hydroxy   16. Therapeutic drug monitoring     17. Impaired fasting glucose  Hemoglobin A1c     Patient has hyperlipidemia which is under excellent control.  This is very important given his occlusive coronary artery disease and AAA as well as history of PTCA and CABG.  He has seen the cardiologist recently and things appear stable.  Patient had one episode of gout in the past and his uric acid level is normal without medication.  BPH symptoms controlled with Flomax.  Patient has a history of a stroke with no recurrence and no residual.  He has panlobular emphysema which is  stable.  He also has a pulmonary nodule which did not show up on a low-dose CT scan that he had in February of this year.  I suspect that that may be because of the low-dose CT.  I think from now on his CT scan should be regular dose CT scans to assess for pulmonary nodules.  He has a history of colon polyps and tells me he is up-to-date on his colonoscopy.  Reflux is not an issue.  Proctalgia fugax seems to be controlled.  Macrocytosis was evaluated without any evidence of vitamin B12 or folic acid deficiency.  His vitamin D is in normal range.    Plan is as follows: No change in current medical regimen.  AAA scan ordered.  Patient will follow-up on the phone for the results.  Otherwise, he will follow-up after November 21, 2020 with lab prior and this will also be subsequent Medicare wellness visit.        Procedures

## 2020-05-29 ENCOUNTER — HOSPITAL ENCOUNTER (OUTPATIENT)
Dept: CT IMAGING | Facility: HOSPITAL | Age: 70
Discharge: HOME | End: 2020-05-29
Admitting: INTERNAL MEDICINE

## 2020-05-29 DIAGNOSIS — Z86.79 HISTORY OF ABDOMINAL AORTIC ANEURYSM (AAA): Chronic | ICD-10-CM

## 2020-05-29 PROCEDURE — 0 IOPAMIDOL PER 1 ML: Performed by: INTERNAL MEDICINE

## 2020-05-29 PROCEDURE — 82565 ASSAY OF CREATININE: CPT

## 2020-05-29 PROCEDURE — 74175 CTA ABDOMEN W/CONTRAST: CPT

## 2020-05-29 RX ADMIN — IOPAMIDOL 95 ML: 755 INJECTION, SOLUTION INTRAVENOUS at 09:28

## 2020-06-01 ENCOUNTER — TELEPHONE (OUTPATIENT)
Dept: INTERNAL MEDICINE | Facility: CLINIC | Age: 70
End: 2020-06-01

## 2020-06-01 LAB — CREAT BLDA-MCNC: 0.8 MG/DL (ref 0.6–1.3)

## 2020-06-01 RX ORDER — CLINDAMYCIN PHOSPHATE 10 MG/G
GEL TOPICAL 2 TIMES DAILY
Qty: 60 G | Refills: 0 | Status: SHIPPED | OUTPATIENT
Start: 2020-06-01 | End: 2020-06-30

## 2020-06-01 NOTE — TELEPHONE ENCOUNTER
----- Message from Vernon Lo sent at 5/30/2020  7:36 AM EDT -----  Regarding: Prescription Question  Contact: [phone]  LYNNE ELLIOTT',,,,[place],,,,,,,,,    PLEASE, SEND TO [facility],,,,[place],([phone]),,,,,,PRESCRIPTION FOR:    60 GRAM  (SIXTY) TUBE CLINDAMYCIN TOPICAL GEL 1%    THIS GEL, IS  IN MYCHART,,BUT AS 30GRAM,,,,,(PLEASE CHANGE)    LYNNE BURNETT,,,,,,COST PER GRAM,   40% CHEAPER,,,60 GRAM VS 30 GRAM     THANKS MUCH,,,,,,    VERNON LO

## 2020-06-30 RX ORDER — EZETIMIBE 10 MG/1
TABLET ORAL
Qty: 90 TABLET | Refills: 1 | Status: SHIPPED | OUTPATIENT
Start: 2020-06-30 | End: 2020-12-22

## 2020-06-30 RX ORDER — CLINDAMYCIN PHOSPHATE 10 MG/G
GEL TOPICAL
Qty: 60 G | Refills: 2 | Status: SHIPPED | OUTPATIENT
Start: 2020-06-30 | End: 2021-07-12

## 2020-07-07 DIAGNOSIS — J30.1 SEASONAL ALLERGIC RHINITIS DUE TO POLLEN: Chronic | ICD-10-CM

## 2020-07-07 RX ORDER — FLUTICASONE PROPIONATE 50 MCG
SPRAY, SUSPENSION (ML) NASAL
Qty: 16 G | Refills: 0 | Status: SHIPPED | OUTPATIENT
Start: 2020-07-07 | End: 2020-10-09

## 2020-07-27 DIAGNOSIS — K21.00 GASTROESOPHAGEAL REFLUX DISEASE WITH ESOPHAGITIS: ICD-10-CM

## 2020-07-27 RX ORDER — ESOMEPRAZOLE MAGNESIUM 40 MG/1
CAPSULE, DELAYED RELEASE ORAL
Qty: 90 CAPSULE | Refills: 1 | Status: SHIPPED | OUTPATIENT
Start: 2020-07-27 | End: 2021-01-19

## 2020-08-19 RX ORDER — ATORVASTATIN CALCIUM 80 MG/1
TABLET, FILM COATED ORAL
Qty: 90 TABLET | Refills: 0 | Status: SHIPPED | OUTPATIENT
Start: 2020-08-19 | End: 2021-01-04

## 2020-10-08 DIAGNOSIS — J30.1 SEASONAL ALLERGIC RHINITIS DUE TO POLLEN: Chronic | ICD-10-CM

## 2020-10-09 RX ORDER — FLUTICASONE PROPIONATE 50 MCG
SPRAY, SUSPENSION (ML) NASAL
Qty: 16 G | Refills: 2 | Status: SHIPPED | OUTPATIENT
Start: 2020-10-09 | End: 2021-06-10

## 2020-11-18 ENCOUNTER — LAB (OUTPATIENT)
Dept: LAB | Facility: HOSPITAL | Age: 70
End: 2020-11-18

## 2020-11-18 LAB
25(OH)D3 SERPL-MCNC: 60.6 NG/ML (ref 30–100)
ALBUMIN SERPL-MCNC: 4.2 G/DL (ref 3.5–5.2)
ALBUMIN/GLOB SERPL: 1.5 G/DL
ALP SERPL-CCNC: 90 U/L (ref 39–117)
ALT SERPL W P-5'-P-CCNC: 25 U/L (ref 1–41)
ANION GAP SERPL CALCULATED.3IONS-SCNC: 6.9 MMOL/L (ref 5–15)
AST SERPL-CCNC: 27 U/L (ref 1–40)
BILIRUB SERPL-MCNC: 0.3 MG/DL (ref 0–1.2)
BUN SERPL-MCNC: 22 MG/DL (ref 8–23)
BUN/CREAT SERPL: 24.2 (ref 7–25)
CALCIUM SPEC-SCNC: 9.4 MG/DL (ref 8.6–10.5)
CHLORIDE SERPL-SCNC: 105 MMOL/L (ref 98–107)
CK SERPL-CCNC: 150 U/L (ref 20–200)
CO2 SERPL-SCNC: 28.1 MMOL/L (ref 22–29)
CREAT SERPL-MCNC: 0.91 MG/DL (ref 0.76–1.27)
DEPRECATED RDW RBC AUTO: 40.8 FL (ref 37–54)
ERYTHROCYTE [DISTWIDTH] IN BLOOD BY AUTOMATED COUNT: 11.9 % (ref 12.3–15.4)
GFR SERPL CREATININE-BSD FRML MDRD: 83 ML/MIN/1.73
GLOBULIN UR ELPH-MCNC: 2.8 GM/DL
GLUCOSE SERPL-MCNC: 98 MG/DL (ref 65–99)
HBA1C MFR BLD: 5.7 % (ref 4.8–5.6)
HCT VFR BLD AUTO: 42.4 % (ref 37.5–51)
HGB BLD-MCNC: 14.4 G/DL (ref 13–17.7)
MCH RBC QN AUTO: 31.8 PG (ref 26.6–33)
MCHC RBC AUTO-ENTMCNC: 34 G/DL (ref 31.5–35.7)
MCV RBC AUTO: 93.6 FL (ref 79–97)
PLATELET # BLD AUTO: 175 10*3/MM3 (ref 140–450)
PMV BLD AUTO: 11.8 FL (ref 6–12)
POTASSIUM SERPL-SCNC: 4.5 MMOL/L (ref 3.5–5.2)
PROT SERPL-MCNC: 7 G/DL (ref 6–8.5)
RBC # BLD AUTO: 4.53 10*6/MM3 (ref 4.14–5.8)
SODIUM SERPL-SCNC: 140 MMOL/L (ref 136–145)
T3FREE SERPL-MCNC: 3.25 PG/ML (ref 2–4.4)
T4 FREE SERPL-MCNC: 1.02 NG/DL (ref 0.93–1.7)
TSH SERPL DL<=0.05 MIU/L-ACNC: 0.63 UIU/ML (ref 0.27–4.2)
WBC # BLD AUTO: 9.1 10*3/MM3 (ref 3.4–10.8)

## 2020-11-18 PROCEDURE — 82550 ASSAY OF CK (CPK): CPT | Performed by: INTERNAL MEDICINE

## 2020-11-18 PROCEDURE — 83704 LIPOPROTEIN BLD QUAN PART: CPT | Performed by: INTERNAL MEDICINE

## 2020-11-18 PROCEDURE — 83036 HEMOGLOBIN GLYCOSYLATED A1C: CPT | Performed by: INTERNAL MEDICINE

## 2020-11-18 PROCEDURE — 80061 LIPID PANEL: CPT | Performed by: INTERNAL MEDICINE

## 2020-11-18 PROCEDURE — 36415 COLL VENOUS BLD VENIPUNCTURE: CPT

## 2020-11-18 PROCEDURE — 85027 COMPLETE CBC AUTOMATED: CPT | Performed by: INTERNAL MEDICINE

## 2020-11-18 PROCEDURE — 80053 COMPREHEN METABOLIC PANEL: CPT | Performed by: INTERNAL MEDICINE

## 2020-11-18 PROCEDURE — 82306 VITAMIN D 25 HYDROXY: CPT | Performed by: INTERNAL MEDICINE

## 2020-11-18 PROCEDURE — 84481 FREE ASSAY (FT-3): CPT | Performed by: INTERNAL MEDICINE

## 2020-11-18 PROCEDURE — 84443 ASSAY THYROID STIM HORMONE: CPT | Performed by: INTERNAL MEDICINE

## 2020-11-18 PROCEDURE — 84439 ASSAY OF FREE THYROXINE: CPT | Performed by: INTERNAL MEDICINE

## 2020-11-20 LAB
CHOLEST SERPL-MCNC: 130 MG/DL (ref 100–199)
HDL SERPL-SCNC: 33.7 UMOL/L
HDLC SERPL-MCNC: 47 MG/DL
LDL SERPL QN: 20.2 NM
LDL SERPL-SCNC: 924 NMOL/L
LDL SMALL SERPL-SCNC: 566 NMOL/L
LDLC SERPL CALC-MCNC: 70 MG/DL (ref 0–99)
TRIGL SERPL-MCNC: 62 MG/DL (ref 0–149)

## 2020-11-22 DIAGNOSIS — K59.4 PROCTALGIA FUGAX: ICD-10-CM

## 2020-11-23 RX ORDER — DIAZEPAM 5 MG/1
TABLET ORAL
Qty: 30 TABLET | Refills: 4 | Status: SHIPPED | OUTPATIENT
Start: 2020-11-23 | End: 2021-05-24

## 2020-11-25 ENCOUNTER — OFFICE VISIT (OUTPATIENT)
Dept: INTERNAL MEDICINE | Facility: CLINIC | Age: 70
End: 2020-11-25

## 2020-11-25 VITALS
DIASTOLIC BLOOD PRESSURE: 60 MMHG | SYSTOLIC BLOOD PRESSURE: 122 MMHG | OXYGEN SATURATION: 97 % | HEIGHT: 69 IN | HEART RATE: 77 BPM | BODY MASS INDEX: 26.81 KG/M2 | WEIGHT: 181 LBS

## 2020-11-25 DIAGNOSIS — Z86.79 HISTORY OF ABDOMINAL AORTIC ANEURYSM (AAA): Chronic | ICD-10-CM

## 2020-11-25 DIAGNOSIS — Z86.010 HISTORY OF COLON POLYPS: Chronic | ICD-10-CM

## 2020-11-25 DIAGNOSIS — E55.9 VITAMIN D DEFICIENCY: Chronic | ICD-10-CM

## 2020-11-25 DIAGNOSIS — Z95.1 HX OF CABG: Chronic | ICD-10-CM

## 2020-11-25 DIAGNOSIS — Z86.73 HISTORY OF STROKE: Chronic | ICD-10-CM

## 2020-11-25 DIAGNOSIS — Z98.61 HISTORY OF PTCA: Chronic | ICD-10-CM

## 2020-11-25 DIAGNOSIS — E78.2 MIXED HYPERLIPIDEMIA: Chronic | ICD-10-CM

## 2020-11-25 DIAGNOSIS — Z51.81 THERAPEUTIC DRUG MONITORING: ICD-10-CM

## 2020-11-25 DIAGNOSIS — K59.4 PROCTALGIA FUGAX: Chronic | ICD-10-CM

## 2020-11-25 DIAGNOSIS — D75.89 MACROCYTOSIS: Chronic | ICD-10-CM

## 2020-11-25 DIAGNOSIS — R39.12 BENIGN PROSTATIC HYPERPLASIA WITH WEAK URINARY STREAM: Chronic | ICD-10-CM

## 2020-11-25 DIAGNOSIS — Z87.39 HISTORY OF GOUT: Chronic | ICD-10-CM

## 2020-11-25 DIAGNOSIS — R00.2 HEART PALPITATIONS: ICD-10-CM

## 2020-11-25 DIAGNOSIS — J43.1 PANLOBULAR EMPHYSEMA (HCC): Chronic | ICD-10-CM

## 2020-11-25 DIAGNOSIS — N40.1 BENIGN PROSTATIC HYPERPLASIA WITH WEAK URINARY STREAM: Chronic | ICD-10-CM

## 2020-11-25 DIAGNOSIS — R91.8 MULTIPLE PULMONARY NODULES: Chronic | ICD-10-CM

## 2020-11-25 DIAGNOSIS — K21.00 GASTROESOPHAGEAL REFLUX DISEASE WITH ESOPHAGITIS WITHOUT HEMORRHAGE: Chronic | ICD-10-CM

## 2020-11-25 DIAGNOSIS — I25.10 OCCLUSIVE CORONARY ARTERY DISEASE: Chronic | ICD-10-CM

## 2020-11-25 DIAGNOSIS — Z00.00 ENCOUNTER FOR SUBSEQUENT ANNUAL WELLNESS VISIT (AWV) IN MEDICARE PATIENT: Primary | ICD-10-CM

## 2020-11-25 DIAGNOSIS — R73.01 IMPAIRED FASTING GLUCOSE: Chronic | ICD-10-CM

## 2020-11-25 PROCEDURE — 96160 PT-FOCUSED HLTH RISK ASSMT: CPT | Performed by: INTERNAL MEDICINE

## 2020-11-25 PROCEDURE — 99214 OFFICE O/P EST MOD 30 MIN: CPT | Performed by: INTERNAL MEDICINE

## 2020-11-25 PROCEDURE — G0439 PPPS, SUBSEQ VISIT: HCPCS | Performed by: INTERNAL MEDICINE

## 2020-11-25 PROCEDURE — 1170F FXNL STATUS ASSESSED: CPT | Performed by: INTERNAL MEDICINE

## 2020-11-25 PROCEDURE — 1126F AMNT PAIN NOTED NONE PRSNT: CPT | Performed by: INTERNAL MEDICINE

## 2020-11-25 RX ORDER — SODIUM FLUORIDE 6 MG/ML
PASTE, DENTIFRICE DENTAL
COMMUNITY
End: 2021-05-11 | Stop reason: SDUPTHER

## 2020-11-25 NOTE — PROGRESS NOTES
The ABCs of the Annual Wellness Visit  Subsequent Medicare Wellness Visit    No chief complaint on file.      Subjective   History of Present Illness:  Vernon Willis is a 69 y.o. male who presents for a Subsequent Medicare Wellness Visit.    HEALTH RISK ASSESSMENT    Recent Hospitalizations:  No hospitalization(s) within the last year.    Current Medical Providers:  Patient Care Team:  Ryan Hutchinson MD as PCP - General (Internal Medicine)    Smoking Status:  Social History     Tobacco Use   Smoking Status Former Smoker   • Packs/day: 0.50   • Types: Cigarettes   • Start date:    • Quit date: 2020   • Years since quittin.9   Smokeless Tobacco Never Used       Alcohol Consumption:  Social History     Substance and Sexual Activity   Alcohol Use No   • Frequency: Never   • Binge frequency: Never       Depression Screen:   PHQ-2/PHQ-9 Depression Screening 2020   Little interest or pleasure in doing things 0   Feeling down, depressed, or hopeless 0   Total Score 0       Fall Risk Screen:  JERRY Fall Risk Assessment was completed, and patient is at LOW risk for falls.Assessment completed on:2020    Health Habits and Functional and Cognitive Screening:  Functional & Cognitive Status 2020   Do you have difficulty preparing food and eating? No   Do you have difficulty bathing yourself, getting dressed or grooming yourself? No   Do you have difficulty using the toilet? No   Do you have difficulty moving around from place to place? No   Do you have trouble with steps or getting out of a bed or a chair? No   Current Diet Well Balanced Diet   Dental Exam Up to date   Eye Exam Up to date   Exercise (times per week) 7 times per week   Current Exercises Include Walking   Current Exercise Activities Include -   Do you need help using the phone?  No   Are you deaf or do you have serious difficulty hearing?  Yes   Do you need help with transportation? No   Do you need help shopping? No   Do you  need help preparing meals?  No   Do you need help with housework?  No   Do you need help with laundry? No   Do you need help taking your medications? No   Do you need help managing money? No   Do you ever drive or ride in a car without wearing a seat belt? No   Have you felt unusual stress, anger or loneliness in the last month? No   Who do you live with? Alone   If you need help, do you have trouble finding someone available to you? No   Have you been bothered in the last four weeks by sexual problems? -   Do you have difficulty concentrating, remembering or making decisions? No         Does the patient have evidence of cognitive impairment? No    Asprin use counseling:Taking ASA appropriately as indicated    Age-appropriate Screening Schedule:  Refer to the list below for future screening recommendations based on patient's age, sex and/or medical conditions. Orders for these recommended tests are listed in the plan section. The patient has been provided with a written plan.    Health Maintenance   Topic Date Due   • COLONOSCOPY  02/01/2021   • LIPID PANEL  11/18/2021   • INFLUENZA VACCINE  Completed   • TDAP/TD VACCINES  Discontinued   • ZOSTER VACCINE  Discontinued          The following portions of the patient's history were reviewed and updated as appropriate: allergies, current medications, past family history, past medical history, past social history, past surgical history and problem list.    Outpatient Medications Prior to Visit   Medication Sig Dispense Refill   • ascorbic acid (VITAMIN C) 1000 MG tablet      • aspirin 81 MG tablet Take 81 mg by mouth Daily.     • atorvastatin (LIPITOR) 80 MG tablet TAKE 1 TABLET EVERY DAY FOR HIGH CHOLESTEROL 90 tablet 0   • chlorhexidine (PAROEX) 0.12 % solution USE 15 MLS IN THE MOUTH OR THROAT TWO TIMES DAILY (DO NOT SWALLOW)     • Cholecalciferol (VITAMIN D3) 5000 units capsule capsule Take  by mouth.     • clindamycin (CLINDAGEL) 1 % gel APPLY TO AFFECTED AREA(S) AS  DIRECTED TWO TIMES A DAY 60 g 2   • clopidogrel (PLAVIX) 75 MG tablet Take 1 p.o. daily for blood thinner 30 tablet    • Cyanocobalamin (B-12) 1000 MCG tablet Take 1 tablet by mouth Daily.     • desonide (DESOWEN) 0.05 % cream Apply  topically to the appropriate area as directed.     • diazePAM (VALIUM) 5 MG tablet TAKE ONE TABLET BY MOUTH DAILY AS NEEDED 30 tablet 4   • Ergocalciferol (VITAMIN D2) 2000 units tablet Take 1 tablet by mouth Daily.     • erythromycin (ROMYCIN) 5 MG/GM ophthalmic ointment PUT ON QTIP AND APPLY TO EYELID AT BEDTIME     • esomeprazole (nexIUM) 40 MG capsule TAKE ONE CAPSULE BY MOUTH DAILY BEFORE THE FIRST MEAL FOR REFLUX 90 capsule 1   • ezetimibe (ZETIA) 10 MG tablet TAKE ONE TABLET BY MOUTH DAILY FOR HIGH CHOLESTEROL 90 tablet 1   • fluorometholone (FML) 0.1 % ophthalmic suspension INSTILL 1 DROP INTO EACH EYE TWICE DAILY  5   • fluticasone (FLONASE) 50 MCG/ACT nasal spray USE 2 SPRAY(S) IN EACH NOSTRIL ONCE DAILY AS NEEDED 16 g 2   • hydrocortisone 2.5 % cream APPLY  CREAM RECTALLY TWICE DAILY 28 g 3   • metoprolol succinate XL (TOPROL-XL) 25 MG 24 hr tablet TAKE 1 TABLET BY MOUTH DAILY ALONG WITH THE 50MG TABLET AS NEEDED FOR PALPITATIONS.     • metoprolol succinate XL (TOPROL-XL) 50 MG 24 hr tablet      • podofilox (CONDYLOX) 0.5 % external solution Apply  topically to the appropriate area as directed.     • Sodium Fluoride (PreviDent 5000 Booster Plus) 1.1 % paste Apply  to teeth.     • tamsulosin (FLOMAX) 0.4 MG capsule 24 hr capsule Take 1 p.o. twice daily for prostate 60 capsule 6   • Vitamin A 2400 MCG (8000 UT) capsule Take  by mouth Daily.     • vitamin C (ASCORBIC ACID) 250 MG tablet Take 250 mg by mouth Daily.     • Zinc 100 MG tablet      • hydrocortisone (PROCTOZONE-HC) 2.5 % rectal cream Insert  into the rectum 2 (Two) Times a Day. 28.35 g 1   • Sod Fluoride-Potassium Nitrate 1.1-5 % gel Apply 100 mL to teeth 2 (Two) Times a Day. 112 g 1   • Sodium Fluoride (PreviDent  5000 Booster) 1.1 % paste Apply  to teeth.       No facility-administered medications prior to visit.        Patient Active Problem List   Diagnosis   • History of abdominal aortic aneurysm (AAA), 6/23/2017--endovascular AAA repair with stent.   • Occlusive coronary artery disease, 12/2/2015--cardiac cath: LIMA to LAD (patent); RADHA to RCA (occluded 100%); SVG to obtuse marginal (occluded 100%); SVG to diagonal (occluded 100%).     • Hyperlipidemia   • History of gout   • Chronic allergic conjunctivitis   • Vitamin D deficiency   • Benign prostatic hyperplasia with weak urinary stream   • Therapeutic drug monitoring   • History of stroke   • History of PTCA, 7/15/2013--drug-eluting stent proximal and origin of LM.   • Hx of CABG, 2004--LIMA to LAD; RADHA to RCA; SVG to obtuse marginal; SVG to diagonal.   • Pulmonary emphysema (CMS/HCC)   • Multiple pulmonary nodules, 1/29/2019--minimal change in clustered nodularity and tree-in-bud nodularity upper lobes and right middle lobe.  Recommend yearly low-dose CT screening.   • History of colon polyps   • Genital warts   • Allergic rhinitis   • Gastroesophageal reflux disease with esophagitis   • Proctalgia fugax   • Macrocytosis   • Impaired fasting glucose   • Heart palpitations       Advanced Care Planning:  ACP discussion was held with the patient during this visit. Patient has an advance directive in EMR which is still valid.     Review of Systems   Constitutional: Negative.    HENT: Negative.    Eyes: Negative.    Respiratory: Negative.    Cardiovascular: Negative.    Gastrointestinal: Negative.    Endocrine: Negative.    Genitourinary: Negative.    Musculoskeletal: Negative.    Skin: Negative.    Allergic/Immunologic: Negative.    Neurological: Negative.    Hematological: Negative.    Psychiatric/Behavioral: Negative.        Compared to one year ago, the patient feels his physical health is the same.  Compared to one year ago, the patient feels his mental health is  "the same.    Reviewed chart for potential of high risk medication in the elderly: yes  Reviewed chart for potential of harmful drug interactions in the elderly:yes    Objective         Vitals:    11/25/20 0834   BP: 122/60   BP Location: Left arm   Patient Position: Sitting   Cuff Size: Adult   Pulse: 77   SpO2: 97%   Weight: 82.1 kg (181 lb)   Height: 175.3 cm (69\")       Body mass index is 26.73 kg/m².  Discussed the patient's BMI with him. The BMI is in the acceptable range.    Physical Exam    General: Alert and oriented x 3.  No acute distress.  Normal affect.  HEENT: Pupils equal, round, reactive to light; extraocular movements intact; sclerae nonicteric; pharynx, ear canals and TMs normal.  Neck: Without JVD, thyromegaly, bruit, or adenopathy.  Lungs: Clear to auscultation in all fields.  Heart: Regular rate and rhythm without murmur, rub, gallop, or click.  Abdomen: Soft, nontender, without hepatosplenomegaly or hernia.  Bowel sounds normal.  : Deferred.  Rectal: Deferred.  Extremities: Without clubbing, cyanosis, edema, or pulse deficit.  Neurologic: Intact without focal deficit.  Normal station and gait observed during ingress and egress from the examination room.  Skin: Without significant lesion.  Musculoskeletal: Unremarkable.    Lab Results   Component Value Date    CHLPL 130 11/18/2020    TRIG 62 11/18/2020    HGBA1C 5.70 (H) 11/18/2020        Assessment/Plan   Medicare Risks and Personalized Health Plan  CMS Preventative Services Quick Reference  Advance Directive Discussion  Cardiovascular risk  Colon Cancer Screening  Diabetic Lab Screening   Lung Cancer Risk  Prostate Cancer Screening     The above risks/problems have been discussed with the patient.  Pertinent information has been shared with the patient in the After Visit Summary.  Follow up plans and orders are seen below in the Assessment/Plan Section.    Diagnoses and all orders for this visit:    1. Encounter for subsequent annual wellness " visit (AWV) in Medicare patient (Primary)    2. Impaired fasting glucose  -     Comprehensive Metabolic Panel; Future  -     Hemoglobin A1c; Future    3. Hyperlipidemia  -     CK; Future  -     Comprehensive Metabolic Panel; Future  -     NMR LipoProfile; Future  -     TSH; Future  -     T4, Free; Future  -     T3, Free; Future    4. Occlusive coronary artery disease, 12/2/2015--cardiac cath: LIMA to LAD (patent); RADHA to RCA (occluded 100%); SVG to obtuse marginal (occluded 100%); SVG to diagonal (occluded 100%).      5. History of abdominal aortic aneurysm (AAA), 6/23/2017--endovascular AAA repair with stent.  -     Ambulatory Referral to Vascular Surgery    6. History of PTCA, 7/15/2013--drug-eluting stent proximal and origin of LM.    7. Hx of CABG, 2004--LIMA to LAD; RADHA to RCA; SVG to obtuse marginal; SVG to diagonal.    8. Heart palpitations    9. History of stroke    10. Panlobular emphysema (CMS/HCC)    11. Multiple pulmonary nodules, 1/29/2019--minimal change in clustered nodularity and tree-in-bud nodularity upper lobes and right middle lobe.  Recommend yearly low-dose CT screening.    12. History of gout  -     Uric Acid; Future    13. Benign prostatic hyperplasia with weak urinary stream  -     PSA DIAGNOSTIC; Future    14. Vitamin D deficiency  -     Vitamin D 25 Hydroxy; Future    15. History of colon polyps  -     Ambulatory Referral For Screening Colonoscopy    16. Gastroesophageal reflux disease with esophagitis without hemorrhage    17. Proctalgia fugax    18. Macrocytosis  -     CBC (No Diff); Future    19. Therapeutic drug monitoring      Follow Up:  Return in about 6 months (around 5/25/2021) for Next scheduled follow up with lab prior.     An After Visit Summary and PPPS were given to the patient.

## 2020-11-25 NOTE — PROGRESS NOTES
11/25/2020    Patient Information  Vernon Willis                                                                                          [address]  [facility] [address]      1950  [unfilled]  There is no work phone number on file.    Chief Complaint:     Subsequent Medicare wellness visit.  Follow-up lab work in order to monitor chronic medical issues listed in history of present illness.  No new acute complaints.    History of Present Illness:    Patient with a history of multiple medical problems including impaired fasting glucose, hyperlipidemia, occlusive coronary artery disease and history of PTCA and CABG, history of AAA, history of stroke, emphysema, multiple pulmonary nodules, gout, symptomatic BPH, vitamin D deficiency, history of colon polyps, esophageal reflux, proctalgia fugax, macrocytosis.  He presents today for subsequent Medicare wellness visit.  Patient also had lab work in order to monitor his chronic medical issues.  Past medical history reviewed and updated were necessary including health maintenance parameters.  This reveals he will be up-to-date or else accounted for.    Review of Systems   Constitution: Negative.   HENT: Negative.    Eyes: Negative.    Cardiovascular: Positive for palpitations.   Respiratory: Negative.    Endocrine: Negative.    Hematologic/Lymphatic: Negative.    Skin: Negative.    Musculoskeletal: Negative.    Gastrointestinal: Negative.    Genitourinary: Negative.    Neurological: Negative.    Psychiatric/Behavioral: Negative.    Allergic/Immunologic: Negative.        Active Problems:    Patient Active Problem List   Diagnosis   • History of abdominal aortic aneurysm (AAA), 6/23/2017--endovascular AAA repair with stent.   • Occlusive coronary artery disease, 12/2/2015--cardiac cath: LIMA to LAD (patent); RADHA to RCA (occluded 100%); SVG to obtuse marginal (occluded 100%); SVG to diagonal (occluded 100%).     • Hyperlipidemia   • History of gout    • Chronic allergic conjunctivitis   • Vitamin D deficiency   • Benign prostatic hyperplasia with weak urinary stream   • Therapeutic drug monitoring   • History of stroke   • History of PTCA, 7/15/2013--drug-eluting stent proximal and origin of LM.   • Hx of CABG, 2004--LIMA to LAD; RADHA to RCA; SVG to obtuse marginal; SVG to diagonal.   • Pulmonary emphysema (CMS/HCC)   • Multiple pulmonary nodules, 1/29/2019--minimal change in clustered nodularity and tree-in-bud nodularity upper lobes and right middle lobe.  Recommend yearly low-dose CT screening.   • History of colon polyps   • Genital warts   • Allergic rhinitis   • Gastroesophageal reflux disease with esophagitis   • Proctalgia fugax   • Macrocytosis   • Impaired fasting glucose   • Heart palpitations         Past Medical History:   Diagnosis Date   • Abnormal CT of the chest 8/13/2019 January 29, 2019--CT scan of the chest without contrast performed for abnormal CT scan reveals minimal change in clustered nodularity and tree-in-bud nodularity involving dominantly the upper lobes and right middle lobe, likely postinfectious or postinflammatory.  A few new areas of peripheral mucus plugging noted at the right lower lobe.  No new or enlarging pulmonary nodules.  Return to annual s   • Allergic rhinitis 8/13/2019   • Benign prostatic hyperplasia with weak urinary stream 8/13/2019   • Chronic allergic conjunctivitis 8/13/2019   • Cigarette nicotine dependence  8/13/2019   • Gastroesophageal reflux disease with esophagitis 8/13/2019   • Genital warts 8/13/2019   • Heart palpitations 11/25/2020   • History of abdominal aortic aneurysm (AAA), 6/23/2017--endovascular AAA repair with stent. 8/13/2019 August 1, 2017--CTA of the abdomen: There has been endovascular repair of the abdominal aortic aneurysm with stent extending from just below the origin of the left renal vein into both common iliac arteries. No evidence for endoleak on arterial phase imaging. The  excluded aneurysmal sac measures 4.8 cm in maximal diameter, previously 5.0 cm. Aortoiliac atherosclerosis with moderate narrowing at th   • History of cardiac catheterization 8/13/2019 December 2, 2015--cardiac catheterization.  INDICATION(S): This is a 64-year-old male with a history of coronary artery disease status post coronary artery bypass grafting. He presented to Dr. Clarke with symptoms consistent with his prior angina. He underwent a nuclear stress test that showed a mid-anterior wall myocardial infarction with a small amount of ischemia in the ventricular apex. He was s   • History of colon polyps 8/13/2019 February 2016--colonoscopy with polypectomy.  Pathology not known.  2010--colonoscopy revealed a serrated adenoma.   • History of gout 8/13/2019   • History of posterior vitreous detachment of right eye, 10/15/2016--repair. 8/13/2019 October 5, 2016--vitrectomy, membrane peel, panretinal endophotocoagulation laser, right eye for preretinal membrane, vitreous hemorrhage, and posterior vitreous detachment of right eye.        • History of PTCA, 7/15/2013--drug-eluting stent proximal and origin of LM. 8/13/2019    July 15, 2013--successful drug-eluting stent PCI to the proximal and origin of the left main using vascular ultrasound guidance.   • History of stroke, questionable. 8/13/2019          • Hx of CABG, 2004--LIMA to LAD; RADHA to RCA; SVG to obtuse marginal; SVG to diagonal. 8/13/2019 December 2, 2015--cardiac catheterization: LIMA to LAD (patent); RADHA to RCA (occluded 100%); SVG to obtuse marginal (occluded 100%); SVG to diagonal (occluded 100%).  2004--four-vessel CABG.  LIMA to LAD; RADHA to RCA; SVG to obtuse marginal; SVG to diagonal.   • Hyperlipidemia 8/13/2019   • Impaired fasting glucose 5/21/2020   • Macrocytosis 8/13/2019 July 25, 2019--MCV elevated at 102.2.  Vitamin B12 level was above normal at 1208.  Methylmalonic acid was not performed.   • Multiple pulmonary nodules,  1/29/2019--minimal change in clustered nodularity and tree-in-bud nodularity upper lobes and right middle lobe.  Recommend yearly low-dose CT screening. 8/13/2019 January 29, 2019--CT scan of the chest without contrast performed for abnormal CT scan reveals minimal change in clustered nodularity and tree-in-bud nodularity involving dominantly the upper lobes and right middle lobe, likely postinfectious or postinflammatory.  A few new areas of peripheral mucus plugging noted at the right lower lobe.  No new or enlarging pulmonary nodules.  Return to annual s   • Occlusive coronary artery disease, 12/2/2015--cardiac cath: LIMA to LAD (patent); RADHA to RCA (occluded 100%); SVG to obtuse marginal (occluded 100%); SVG to diagonal (occluded 100%).   8/13/2019 December 2, 2015--cardiac catheterization: LIMA to LAD (patent); RADHA to RCA (occluded 100%); SVG to obtuse marginal (occluded 100%); SVG to diagonal (occluded 100%).  July 15, 2013--successful drug-eluting stent PCI to the proximal and origin of the left main using vascular ultrasound guidance.  2004--four-vessel CABG.  LIMA to LAD; RADHA to RCA; SVG to obtuse marginal; SVG to diagonal.   HPI: Fra   • Proctalgia fugax 8/13/2019   • Pulmonary emphysema (CMS/HCC) 8/13/2019 January 29, 2019--CT scan of the chest without contrast performed for abnormal CT scan reveals minimal change in clustered nodularity and tree-in-bud nodularity involving dominantly the upper lobes and right middle lobe, likely postinfectious or postinflammatory.  A few new areas of peripheral mucus plugging noted at the right lower lobe.  No new or enlarging pulmonary nodules.  Return to annual s   • Vitamin D deficiency 8/13/2019         Past Surgical History:   Procedure Laterality Date   • ABDOMINAL AORTIC ANEURYSM REPAIR W/ ENDOLUMINAL GRAFT  06/23/2017 June 23, 2017--endovascular repair of AAA.   • CARDIAC CATHETERIZATION  12/02/2015 December 2, 2015--cardiac catheterization.   See past medical history for details.   • CATARACT EXTRACTION, BILATERAL     • COLONOSCOPY  2010 2010--colonoscopy revealed a serrated adenoma.   • COLONOSCOPY W/ POLYPECTOMY  02/2016 February 2016--colonoscopy with polypectomy.  Pathology not known.   • CORONARY ANGIOPLASTY WITH STENT PLACEMENT  07/15/2013    July 15, 2013--successful drug-eluting stent PCI to the proximal and origin of the left main using vascular ultrasound guidance.   • CORONARY ARTERY BYPASS GRAFT  2004 2004--four-vessel CABG.  LIMA to LAD; RADHA to RCA; SVG to obtuse marginal; SVG to diagonal.   • PARS PLANA VITRECTOMY W/ ENDOPHOTOCOAGULATION  10/05/2016    October 5, 2016--vitrectomy, membrane peel, panretinal endophotocoagulation laser, right eye for preretinal membrane, vitreous hemorrhage, and posterior vitreous detachment of right eye.   • UPPER GASTROINTESTINAL ENDOSCOPY  02/2016 February 2016--EGD reportedly revealed esophagitis and gastritis.         Allergies   Allergen Reactions   • Codeine Hives   • Msg [Monosodium Glutamate] Other (See Comments)     Passes out   • Penicillins Hives           Current Outpatient Medications:   •  ascorbic acid (VITAMIN C) 1000 MG tablet, , Disp: , Rfl:   •  aspirin 81 MG tablet, Take 81 mg by mouth Daily., Disp: , Rfl:   •  atorvastatin (LIPITOR) 80 MG tablet, TAKE 1 TABLET EVERY DAY FOR HIGH CHOLESTEROL, Disp: 90 tablet, Rfl: 0  •  chlorhexidine (PAROEX) 0.12 % solution, USE 15 MLS IN THE MOUTH OR THROAT TWO TIMES DAILY (DO NOT SWALLOW), Disp: , Rfl:   •  Cholecalciferol (VITAMIN D3) 5000 units capsule capsule, Take  by mouth., Disp: , Rfl:   •  clindamycin (CLINDAGEL) 1 % gel, APPLY TO AFFECTED AREA(S) AS DIRECTED TWO TIMES A DAY, Disp: 60 g, Rfl: 2  •  clopidogrel (PLAVIX) 75 MG tablet, Take 1 p.o. daily for blood thinner, Disp: 30 tablet, Rfl:   •  Cyanocobalamin (B-12) 1000 MCG tablet, Take 1 tablet by mouth Daily., Disp: , Rfl:   •  desonide (DESOWEN) 0.05 % cream, Apply  topically  to the appropriate area as directed., Disp: , Rfl:   •  diazePAM (VALIUM) 5 MG tablet, TAKE ONE TABLET BY MOUTH DAILY AS NEEDED, Disp: 30 tablet, Rfl: 4  •  Ergocalciferol (VITAMIN D2) 2000 units tablet, Take 1 tablet by mouth Daily., Disp: , Rfl:   •  erythromycin (ROMYCIN) 5 MG/GM ophthalmic ointment, PUT ON QTIP AND APPLY TO EYELID AT BEDTIME, Disp: , Rfl:   •  esomeprazole (nexIUM) 40 MG capsule, TAKE ONE CAPSULE BY MOUTH DAILY BEFORE THE FIRST MEAL FOR REFLUX, Disp: 90 capsule, Rfl: 1  •  ezetimibe (ZETIA) 10 MG tablet, TAKE ONE TABLET BY MOUTH DAILY FOR HIGH CHOLESTEROL, Disp: 90 tablet, Rfl: 1  •  fluorometholone (FML) 0.1 % ophthalmic suspension, INSTILL 1 DROP INTO EACH EYE TWICE DAILY, Disp: , Rfl: 5  •  fluticasone (FLONASE) 50 MCG/ACT nasal spray, USE 2 SPRAY(S) IN EACH NOSTRIL ONCE DAILY AS NEEDED, Disp: 16 g, Rfl: 2  •  hydrocortisone 2.5 % cream, APPLY  CREAM RECTALLY TWICE DAILY, Disp: 28 g, Rfl: 3  •  metoprolol succinate XL (TOPROL-XL) 25 MG 24 hr tablet, TAKE 1 TABLET BY MOUTH DAILY ALONG WITH THE 50MG TABLET AS NEEDED FOR PALPITATIONS., Disp: , Rfl:   •  metoprolol succinate XL (TOPROL-XL) 50 MG 24 hr tablet, , Disp: , Rfl:   •  podofilox (CONDYLOX) 0.5 % external solution, Apply  topically to the appropriate area as directed., Disp: , Rfl:   •  Sodium Fluoride (PreviDent 5000 Booster Plus) 1.1 % paste, Apply  to teeth., Disp: , Rfl:   •  tamsulosin (FLOMAX) 0.4 MG capsule 24 hr capsule, Take 1 p.o. twice daily for prostate, Disp: 60 capsule, Rfl: 6  •  Vitamin A 2400 MCG (8000 UT) capsule, Take  by mouth Daily., Disp: , Rfl:   •  vitamin C (ASCORBIC ACID) 250 MG tablet, Take 250 mg by mouth Daily., Disp: , Rfl:   •  Zinc 100 MG tablet, , Disp: , Rfl:       Family History   Problem Relation Age of Onset   • Diabetes type II Mother    • Coronary artery disease Father    • Diabetes type II Father    • Kidney disease Father    • Hypertension Father    • Crohn's disease Sister    • Coronary artery  "disease Brother         Brother  of a myocardial infarction at age 53         Social History     Socioeconomic History   • Marital status:      Spouse name: Not on file   • Number of children: Not on file   • Years of education: Not on file   • Highest education level: Not on file   Occupational History   • Occupation:    Social Needs   • Financial resource strain: Not hard at all   • Food insecurity     Worry: Never true     Inability: Never true   • Transportation needs     Medical: No     Non-medical: No   Tobacco Use   • Smoking status: Former Smoker     Packs/day: 0.50     Types: Cigarettes     Start date:      Quit date: 2020     Years since quittin.9   • Smokeless tobacco: Never Used   Substance and Sexual Activity   • Alcohol use: No     Frequency: Never     Binge frequency: Never   • Drug use: No   • Sexual activity: Not Currently     Partners: Female   Lifestyle   • Physical activity     Days per week: 7 days     Minutes per session: 20 min   • Stress: Only a little   Relationships   • Social connections     Talks on phone: Patient refused     Gets together: Patient refused     Attends [employer] service: Patient refused     Active member of club or organization: Patient refused     Attends meetings of clubs or organizations: Patient refused     Relationship status: Patient refused         Vitals:    20 0834   BP: 122/60   BP Location: Left arm   Patient Position: Sitting   Cuff Size: Adult   Pulse: 77   SpO2: 97%   Weight: 82.1 kg (181 lb)   Height: 175.3 cm (69\")        Body mass index is 26.73 kg/m².      Physical Exam:    General: Alert and oriented x 3.  No acute distress.  Normal affect.  HEENT: Pupils equal, round, reactive to light; extraocular movements intact; sclerae nonicteric; pharynx, ear canals and TMs normal.  Neck: Without JVD, thyromegaly, bruit, or adenopathy.  Lungs: Clear to auscultation in all fields.  Heart: Regular rate and rhythm without " murmur, rub, gallop, or click.  Abdomen: Soft, nontender, without hepatosplenomegaly or hernia.  Bowel sounds normal.  : Deferred.  Rectal: Deferred.  Extremities: Without clubbing, cyanosis, edema, or pulse deficit.  Neurologic: Intact without focal deficit.  Normal station and gait observed during ingress and egress from the examination room.  Skin: Without significant lesion.  Musculoskeletal: Unremarkable.    Lab/other results:    NMR reveals a total cholesterol of 130.  Triglycerides 62.  LDL particle number excellent at 924.  Small LDL particle number slightly elevated at 566.  HDL particle number normal at 33.7.  CMP normal.  Hemoglobin A1c 5.7.  CBC normal.  CPK normal.  Vitamin D normal at 60.6.  Thyroid function test normal.    Assessment/Plan:     Diagnosis Plan   1. Encounter for subsequent annual wellness visit (AWV) in Medicare patient     2. Impaired fasting glucose  Comprehensive Metabolic Panel    Hemoglobin A1c   3. Hyperlipidemia  CK    Comprehensive Metabolic Panel    NMR LipoProfile    TSH    T4, Free    T3, Free   4. Occlusive coronary artery disease, 12/2/2015--cardiac cath: LIMA to LAD (patent); RADHA to RCA (occluded 100%); SVG to obtuse marginal (occluded 100%); SVG to diagonal (occluded 100%).       5. History of abdominal aortic aneurysm (AAA), 6/23/2017--endovascular AAA repair with stent.  Ambulatory Referral to Vascular Surgery   6. History of PTCA, 7/15/2013--drug-eluting stent proximal and origin of LM.     7. Hx of CABG, 2004--LIMA to LAD; RADHA to RCA; SVG to obtuse marginal; SVG to diagonal.     8. Heart palpitations     9. History of stroke     10. Panlobular emphysema (CMS/HCC)     11. Multiple pulmonary nodules, 1/29/2019--minimal change in clustered nodularity and tree-in-bud nodularity upper lobes and right middle lobe.  Recommend yearly low-dose CT screening.     12. History of gout  Uric Acid   13. Benign prostatic hyperplasia with weak urinary stream  PSA DIAGNOSTIC   14.  Vitamin D deficiency  Vitamin D 25 Hydroxy   15. History of colon polyps  Ambulatory Referral For Screening Colonoscopy   16. Gastroesophageal reflux disease with esophagitis without hemorrhage     17. Proctalgia fugax     18. Macrocytosis  CBC (No Diff)   19. Therapeutic drug monitoring         The subsequent Medicare wellness visit is documented on a separate note.    Patient has very mild impaired fasting glucose that does not require medication.  Hyperlipidemia is under excellent control which is important given his known coronary artery disease, history of AAA, and history of stroke.  His coronary artery disease is stable.  He has been doing well since his endovascular repair/stent of his AAA.  He has had a history of stroke with no residual.  He has panlobular emphysema and stop smoking several years ago.  He also has multiple pulmonary nodules that are stable.  He had a screening CT scan for lung cancer in February of this year which was negative.  He has a history of gout but not recently.  He is not on allopurinol.  BPH symptoms seem to be reasonably controlled with Flomax.  Vitamin D in the normal range.  Patient has a history of colon polyps and is up-to-date on his colonoscopy.  He will be due in February of next year.  Esophageal reflux seems to be controlled.  His proctalgia fugax also seems to be under reasonable control.  Macrocytosis appears to have resolved and has been evaluated for vitamin B12 and folic acid deficiency in the past which was negative.    Plan is as follows: No change in current medical regimen.  Medications refilled as appropriate.  I will go ahead and schedule his colonoscopy for February.  Patient will follow-up in 6 months with lab prior and this will also be subsequent Medicare wellness visit.     Procedures

## 2020-12-09 RX ORDER — CHLORHEXIDINE GLUCONATE 0.12 MG/ML
RINSE ORAL
Qty: 1 EACH | Refills: 3 | Status: CANCELLED | OUTPATIENT
Start: 2020-12-09

## 2020-12-10 RX ORDER — CHLORHEXIDINE GLUCONATE 0.12 MG/ML
15 RINSE ORAL 2 TIMES DAILY
Qty: 15 ML | Refills: 3 | Status: SHIPPED | OUTPATIENT
Start: 2020-12-10 | End: 2020-12-16 | Stop reason: SDUPTHER

## 2020-12-16 RX ORDER — CHLORHEXIDINE GLUCONATE 0.12 MG/ML
15 RINSE ORAL 2 TIMES DAILY
Qty: 6 EACH | Refills: 3 | Status: SHIPPED | OUTPATIENT
Start: 2020-12-16 | End: 2021-12-02

## 2020-12-22 DIAGNOSIS — E78.2 MIXED HYPERLIPIDEMIA: Primary | Chronic | ICD-10-CM

## 2020-12-22 RX ORDER — EZETIMIBE 10 MG/1
TABLET ORAL
Qty: 90 TABLET | Refills: 1 | Status: SHIPPED | OUTPATIENT
Start: 2020-12-22 | End: 2021-06-10

## 2021-01-04 RX ORDER — ATORVASTATIN CALCIUM 80 MG/1
TABLET, FILM COATED ORAL
Qty: 90 TABLET | Refills: 2 | Status: SHIPPED | OUTPATIENT
Start: 2021-01-04 | End: 2021-08-16

## 2021-01-19 DIAGNOSIS — K21.00 GASTROESOPHAGEAL REFLUX DISEASE WITH ESOPHAGITIS: ICD-10-CM

## 2021-01-19 RX ORDER — ESOMEPRAZOLE MAGNESIUM 40 MG/1
CAPSULE, DELAYED RELEASE ORAL
Qty: 90 CAPSULE | Refills: 1 | Status: SHIPPED | OUTPATIENT
Start: 2021-01-19 | End: 2021-07-12

## 2021-01-21 ENCOUNTER — PREP FOR SURGERY (OUTPATIENT)
Dept: OTHER | Facility: HOSPITAL | Age: 71
End: 2021-01-21

## 2021-01-21 DIAGNOSIS — Z12.11 SCREEN FOR COLON CANCER: Primary | ICD-10-CM

## 2021-02-04 PROBLEM — Z12.11 SCREEN FOR COLON CANCER: Status: ACTIVE | Noted: 2021-02-04

## 2021-02-17 DIAGNOSIS — R91.8 MULTIPLE PULMONARY NODULES: Primary | Chronic | ICD-10-CM

## 2021-05-11 ENCOUNTER — OFFICE VISIT (OUTPATIENT)
Dept: INTERNAL MEDICINE | Facility: CLINIC | Age: 71
End: 2021-05-11

## 2021-05-11 VITALS
OXYGEN SATURATION: 98 % | WEIGHT: 172.4 LBS | SYSTOLIC BLOOD PRESSURE: 132 MMHG | HEART RATE: 80 BPM | RESPIRATION RATE: 16 BRPM | BODY MASS INDEX: 25.46 KG/M2 | DIASTOLIC BLOOD PRESSURE: 62 MMHG

## 2021-05-11 DIAGNOSIS — R91.8 MULTIPLE PULMONARY NODULES: Chronic | ICD-10-CM

## 2021-05-11 DIAGNOSIS — Z12.11 COLON CANCER SCREENING: ICD-10-CM

## 2021-05-11 DIAGNOSIS — R39.12 BENIGN PROSTATIC HYPERPLASIA WITH WEAK URINARY STREAM: Chronic | ICD-10-CM

## 2021-05-11 DIAGNOSIS — Z95.1 HX OF CABG: Chronic | ICD-10-CM

## 2021-05-11 DIAGNOSIS — I25.10 OCCLUSIVE CORONARY ARTERY DISEASE: Chronic | ICD-10-CM

## 2021-05-11 DIAGNOSIS — Z86.79 HISTORY OF ABDOMINAL AORTIC ANEURYSM (AAA): Chronic | ICD-10-CM

## 2021-05-11 DIAGNOSIS — R00.2 HEART PALPITATIONS: Chronic | ICD-10-CM

## 2021-05-11 DIAGNOSIS — J43.1 PANLOBULAR EMPHYSEMA (HCC): Chronic | ICD-10-CM

## 2021-05-11 DIAGNOSIS — K21.00 GASTROESOPHAGEAL REFLUX DISEASE WITH ESOPHAGITIS WITHOUT HEMORRHAGE: Chronic | ICD-10-CM

## 2021-05-11 DIAGNOSIS — Z86.010 HISTORY OF COLON POLYPS: Chronic | ICD-10-CM

## 2021-05-11 DIAGNOSIS — Z98.61 HISTORY OF PTCA: Chronic | ICD-10-CM

## 2021-05-11 DIAGNOSIS — N40.1 BENIGN PROSTATIC HYPERPLASIA WITH WEAK URINARY STREAM: Chronic | ICD-10-CM

## 2021-05-11 DIAGNOSIS — Z51.81 THERAPEUTIC DRUG MONITORING: ICD-10-CM

## 2021-05-11 DIAGNOSIS — E55.9 VITAMIN D DEFICIENCY: Chronic | ICD-10-CM

## 2021-05-11 DIAGNOSIS — Z87.39 HISTORY OF GOUT: Chronic | ICD-10-CM

## 2021-05-11 DIAGNOSIS — R73.01 IMPAIRED FASTING GLUCOSE: Primary | Chronic | ICD-10-CM

## 2021-05-11 DIAGNOSIS — D75.89 MACROCYTOSIS: Chronic | ICD-10-CM

## 2021-05-11 DIAGNOSIS — E78.2 MIXED HYPERLIPIDEMIA: Chronic | ICD-10-CM

## 2021-05-11 DIAGNOSIS — Z86.73 HISTORY OF STROKE: Chronic | ICD-10-CM

## 2021-05-11 PROCEDURE — 99214 OFFICE O/P EST MOD 30 MIN: CPT | Performed by: INTERNAL MEDICINE

## 2021-05-11 RX ORDER — SODIUM FLUORIDE 6 MG/ML
PASTE, DENTIFRICE DENTAL
Qty: 200 ML | Refills: 11 | Status: SHIPPED | OUTPATIENT
Start: 2021-05-11 | End: 2022-07-11 | Stop reason: SDUPTHER

## 2021-05-11 NOTE — PROGRESS NOTES
05/11/2021    Patient Information  Vernon Willis                                                                                          [address]  [facility] [address]      1950  [unfilled]  There is no work phone number on file.    Chief Complaint:     Follow-up lab work in order to monitor chronic medical issues listed in history of present illness.  No new acute complaints.    History of Present Illness:    Patient with a history of impaired fasting glucose, hyperlipidemia, occlusive coronary artery disease and history of CABG as well as PTCA, history of stroke, history of AAA repair, gout, emphysema and multiple pulmonary nodules, history of colon polyps, esophageal reflux, macrocytosis, heart palpitations, vitamin D deficiency, BPH.  He presents today for follow-up with lab prior in order to monitor his chronic medical issues.  His past medical history reviewed and updated were necessary including health maintenance parameters.  This reveals he is up-to-date or else accounted for.    Review of Systems   Constitutional: Negative.   HENT: Negative.    Eyes: Negative.    Cardiovascular: Negative.    Respiratory: Negative.    Endocrine: Negative.    Hematologic/Lymphatic: Negative.    Skin: Negative.    Musculoskeletal: Negative.    Gastrointestinal: Negative.    Genitourinary: Negative.    Neurological: Negative.    Psychiatric/Behavioral: Negative.    Allergic/Immunologic: Negative.        Active Problems:    Patient Active Problem List   Diagnosis   • History of abdominal aortic aneurysm (AAA), 6/23/2017--endovascular AAA repair with stent.   • Occlusive coronary artery disease, 12/2/2015--cardiac cath: LIMA to LAD (patent); RADHA to RCA (occluded 100%); SVG to obtuse marginal (occluded 100%); SVG to diagonal (occluded 100%).     • Hyperlipidemia   • History of gout   • Chronic allergic conjunctivitis   • Vitamin D deficiency   • Benign prostatic hyperplasia with weak urinary stream    • Therapeutic drug monitoring   • History of stroke   • History of PTCA, 7/15/2013--drug-eluting stent proximal and origin of LM.   • Hx of CABG, 2004--LIMA to LAD; RADHA to RCA; SVG to obtuse marginal; SVG to diagonal.   • Pulmonary emphysema (CMS/HCC)   • Multiple pulmonary nodules, 1/29/2019--minimal change in clustered nodularity and tree-in-bud nodularity upper lobes and right middle lobe.  Recommend yearly low-dose CT screening.   • History of colon polyps   • Genital warts   • Allergic rhinitis   • Gastroesophageal reflux disease with esophagitis   • Proctalgia fugax   • Macrocytosis   • Impaired fasting glucose   • Heart palpitations         Past Medical History:   Diagnosis Date   • Abnormal CT of the chest 8/13/2019 January 29, 2019--CT scan of the chest without contrast performed for abnormal CT scan reveals minimal change in clustered nodularity and tree-in-bud nodularity involving dominantly the upper lobes and right middle lobe, likely postinfectious or postinflammatory.  A few new areas of peripheral mucus plugging noted at the right lower lobe.  No new or enlarging pulmonary nodules.  Return to annual s   • Allergic rhinitis 8/13/2019   • Benign prostatic hyperplasia with weak urinary stream 8/13/2019   • Chronic allergic conjunctivitis 8/13/2019   • Cigarette nicotine dependence  8/13/2019   • Gastroesophageal reflux disease with esophagitis 8/13/2019   • Genital warts 8/13/2019   • Heart palpitations 11/25/2020   • History of abdominal aortic aneurysm (AAA), 6/23/2017--endovascular AAA repair with stent. 8/13/2019 August 1, 2017--CTA of the abdomen: There has been endovascular repair of the abdominal aortic aneurysm with stent extending from just below the origin of the left renal vein into both common iliac arteries. No evidence for endoleak on arterial phase imaging. The excluded aneurysmal sac measures 4.8 cm in maximal diameter, previously 5.0 cm. Aortoiliac atherosclerosis with moderate  narrowing at th   • History of cardiac catheterization 8/13/2019 December 2, 2015--cardiac catheterization.  INDICATION(S): This is a 64-year-old male with a history of coronary artery disease status post coronary artery bypass grafting. He presented to Dr. Clarke with symptoms consistent with his prior angina. He underwent a nuclear stress test that showed a mid-anterior wall myocardial infarction with a small amount of ischemia in the ventricular apex. He was s   • History of colon polyps 8/13/2019 February 2016--colonoscopy with polypectomy.  Pathology not known.  2010--colonoscopy revealed a serrated adenoma.   • History of gout 8/13/2019   • History of posterior vitreous detachment of right eye, 10/15/2016--repair. 8/13/2019 October 5, 2016--vitrectomy, membrane peel, panretinal endophotocoagulation laser, right eye for preretinal membrane, vitreous hemorrhage, and posterior vitreous detachment of right eye.        • History of PTCA, 7/15/2013--drug-eluting stent proximal and origin of LM. 8/13/2019    July 15, 2013--successful drug-eluting stent PCI to the proximal and origin of the left main using vascular ultrasound guidance.   • History of stroke, questionable. 8/13/2019          • Hx of CABG, 2004--LIMA to LAD; RADHA to RCA; SVG to obtuse marginal; SVG to diagonal. 8/13/2019 December 2, 2015--cardiac catheterization: LIMA to LAD (patent); RADHA to RCA (occluded 100%); SVG to obtuse marginal (occluded 100%); SVG to diagonal (occluded 100%).  2004--four-vessel CABG.  LIMA to LAD; RADHA to RCA; SVG to obtuse marginal; SVG to diagonal.   • Hyperlipidemia 8/13/2019   • Impaired fasting glucose 5/21/2020   • Macrocytosis 8/13/2019 July 25, 2019--MCV elevated at 102.2.  Vitamin B12 level was above normal at 1208.  Methylmalonic acid was not performed.   • Multiple pulmonary nodules, 1/29/2019--minimal change in clustered nodularity and tree-in-bud nodularity upper lobes and right middle lobe.  Recommend  yearly low-dose CT screening. 8/13/2019 January 29, 2019--CT scan of the chest without contrast performed for abnormal CT scan reveals minimal change in clustered nodularity and tree-in-bud nodularity involving dominantly the upper lobes and right middle lobe, likely postinfectious or postinflammatory.  A few new areas of peripheral mucus plugging noted at the right lower lobe.  No new or enlarging pulmonary nodules.  Return to annual s   • Occlusive coronary artery disease, 12/2/2015--cardiac cath: LIMA to LAD (patent); RADHA to RCA (occluded 100%); SVG to obtuse marginal (occluded 100%); SVG to diagonal (occluded 100%).   8/13/2019 December 2, 2015--cardiac catheterization: LIMA to LAD (patent); RADHA to RCA (occluded 100%); SVG to obtuse marginal (occluded 100%); SVG to diagonal (occluded 100%).  July 15, 2013--successful drug-eluting stent PCI to the proximal and origin of the left main using vascular ultrasound guidance.  2004--four-vessel CABG.  LIMA to LAD; RADHA to RCA; SVG to obtuse marginal; SVG to diagonal.   HPI: Fra   • Proctalgia fugax 8/13/2019   • Pulmonary emphysema (CMS/HCC) 8/13/2019 January 29, 2019--CT scan of the chest without contrast performed for abnormal CT scan reveals minimal change in clustered nodularity and tree-in-bud nodularity involving dominantly the upper lobes and right middle lobe, likely postinfectious or postinflammatory.  A few new areas of peripheral mucus plugging noted at the right lower lobe.  No new or enlarging pulmonary nodules.  Return to annual s   • Vitamin D deficiency 8/13/2019         Past Surgical History:   Procedure Laterality Date   • ABDOMINAL AORTIC ANEURYSM REPAIR W/ ENDOLUMINAL GRAFT  06/23/2017 June 23, 2017--endovascular repair of AAA.   • CARDIAC CATHETERIZATION  12/02/2015 December 2, 2015--cardiac catheterization.  See past medical history for details.   • CATARACT EXTRACTION, BILATERAL     • COLONOSCOPY  2010 2010--colonoscopy  revealed a serrated adenoma.   • COLONOSCOPY W/ POLYPECTOMY  02/2016 February 2016--colonoscopy with polypectomy.  Pathology not known.   • CORONARY ANGIOPLASTY WITH STENT PLACEMENT  07/15/2013    July 15, 2013--successful drug-eluting stent PCI to the proximal and origin of the left main using vascular ultrasound guidance.   • CORONARY ARTERY BYPASS GRAFT  2004 2004--four-vessel CABG.  LIMA to LAD; RADHA to RCA; SVG to obtuse marginal; SVG to diagonal.   • PARS PLANA VITRECTOMY W/ ENDOPHOTOCOAGULATION  10/05/2016    October 5, 2016--vitrectomy, membrane peel, panretinal endophotocoagulation laser, right eye for preretinal membrane, vitreous hemorrhage, and posterior vitreous detachment of right eye.   • UPPER GASTROINTESTINAL ENDOSCOPY  02/2016 February 2016--EGD reportedly revealed esophagitis and gastritis.         Allergies   Allergen Reactions   • Codeine Hives   • Msg [Monosodium Glutamate] Other (See Comments)     Passes out   • Penicillins Hives           Current Outpatient Medications:   •  ascorbic acid (VITAMIN C) 1000 MG tablet, , Disp: , Rfl:   •  aspirin 81 MG tablet, Take 81 mg by mouth Daily., Disp: , Rfl:   •  atorvastatin (LIPITOR) 80 MG tablet, TAKE 1 TABLET EVERY DAY FOR HIGH CHOLESTEROL, Disp: 90 tablet, Rfl: 2  •  chlorhexidine (Paroex) 0.12 % solution, Apply 15 mL to the mouth or throat 2 (Two) Times a Day., Disp: 6 each, Rfl: 3  •  Cholecalciferol (VITAMIN D3) 5000 units capsule capsule, Take  by mouth., Disp: , Rfl:   •  clindamycin (CLINDAGEL) 1 % gel, APPLY TO AFFECTED AREA(S) AS DIRECTED TWO TIMES A DAY, Disp: 60 g, Rfl: 2  •  clopidogrel (PLAVIX) 75 MG tablet, Take 1 p.o. daily for blood thinner, Disp: 30 tablet, Rfl:   •  Cyanocobalamin (B-12) 1000 MCG tablet, Take 1 tablet by mouth Daily., Disp: , Rfl:   •  desonide (DESOWEN) 0.05 % cream, Apply  topically to the appropriate area as directed., Disp: , Rfl:   •  diazePAM (VALIUM) 5 MG tablet, TAKE ONE TABLET BY MOUTH DAILY AS  NEEDED, Disp: 30 tablet, Rfl: 4  •  Ergocalciferol (VITAMIN D2) 2000 units tablet, Take 1 tablet by mouth Daily., Disp: , Rfl:   •  erythromycin (ROMYCIN) 5 MG/GM ophthalmic ointment, PUT ON QTIP AND APPLY TO EYELID AT BEDTIME, Disp: , Rfl:   •  esomeprazole (nexIUM) 40 MG capsule, TAKE ONE CAPSULE BY MOUTH DAILY BEFORE FIRST MEAL FOR REFLUX, Disp: 90 capsule, Rfl: 1  •  ezetimibe (ZETIA) 10 MG tablet, TAKE ONE TABLET BY MOUTH DAILY FOR HIGH CHOLESTEROL, Disp: 90 tablet, Rfl: 1  •  fluorometholone (FML) 0.1 % ophthalmic suspension, INSTILL 1 DROP INTO EACH EYE TWICE DAILY, Disp: , Rfl: 5  •  fluticasone (FLONASE) 50 MCG/ACT nasal spray, USE 2 SPRAY(S) IN EACH NOSTRIL ONCE DAILY AS NEEDED, Disp: 16 g, Rfl: 2  •  hydrocortisone 2.5 % cream, APPLY TO AFFECTED AREA(S) TWO TIMES A DAY AS DIRECTED, Disp: 30 g, Rfl: 2  •  metoprolol succinate XL (TOPROL-XL) 25 MG 24 hr tablet, TAKE 1 TABLET BY MOUTH DAILY ALONG WITH THE 50MG TABLET AS NEEDED FOR PALPITATIONS., Disp: , Rfl:   •  metoprolol succinate XL (TOPROL-XL) 50 MG 24 hr tablet, , Disp: , Rfl:   •  podofilox (CONDYLOX) 0.5 % external solution, Apply  topically to the appropriate area as directed., Disp: , Rfl:   •  Sodium Fluoride (PreviDent 5000 Booster Plus) 1.1 % paste, Use as directed orally twice daily, Disp: 200 mL, Rfl: 11  •  tamsulosin (FLOMAX) 0.4 MG capsule 24 hr capsule, Take 1 p.o. twice daily for prostate, Disp: 60 capsule, Rfl: 6  •  Vitamin A 2400 MCG (8000 UT) capsule, Take  by mouth Daily., Disp: , Rfl:   •  vitamin C (ASCORBIC ACID) 250 MG tablet, Take 250 mg by mouth Daily., Disp: , Rfl:   •  Zinc 100 MG tablet, , Disp: , Rfl:       Family History   Problem Relation Age of Onset   • Diabetes type II Mother    • Coronary artery disease Father    • Diabetes type II Father    • Kidney disease Father    • Hypertension Father    • Crohn's disease Sister    • Coronary artery disease Brother         Brother  of a myocardial infarction at age 53          Social History     Socioeconomic History   • Marital status:      Spouse name: Not on file   • Number of children: Not on file   • Years of education: Not on file   • Highest education level: Not on file   Tobacco Use   • Smoking status: Former Smoker     Packs/day: 0.50     Types: Cigarettes     Start date:      Quit date: 2020     Years since quittin.3   • Smokeless tobacco: Never Used   Vaping Use   • Vaping Use: Never used   Substance and Sexual Activity   • Alcohol use: No   • Drug use: No   • Sexual activity: Not Currently     Partners: Female         Vitals:    21 0747   BP: 132/62   Pulse: 80   Resp: 16   SpO2: 98%   Weight: 78.2 kg (172 lb 6.4 oz)        Body mass index is 25.46 kg/m².      Physical Exam:    General: Alert and oriented x 3.  No acute distress.  Normal affect.  HEENT: Pupils equal, round, reactive to light; extraocular movements intact; sclerae nonicteric; pharynx, ear canals and TMs normal.  Neck: Without JVD, thyromegaly, bruit, or adenopathy.  Lungs: Clear to auscultation in all fields.  Heart: Regular rate and rhythm without murmur, rub, gallop, or click.  Abdomen: Soft, nontender, without hepatosplenomegaly or hernia.  Bowel sounds normal.  : Deferred.  Rectal: Deferred.  Extremities: Without clubbing, cyanosis, edema, or pulse deficit.  Neurologic: Intact without focal deficit.  Normal station and gait observed during ingress and egress from the examination room.  Skin: Without significant lesion.  Musculoskeletal: Unremarkable.    Lab/other results:    NMR reveals a total cholesterol of 139.  Triglycerides 104.  LDL particle number slightly elevated 1015.  Small LDL particle #527.  HDL particle number normal at 32.4.  CBC normal except MCV elevated at 98.  CMP normal except potassium slightly elevated at 5.3.  Hemoglobin A1c normal at 5.3.  Thyroid function test normal.  PSA normal at 0.3.  Vitamin D normal at 65.6.  Uric acid normal at 5.5.  CPK  normal.    Assessment/Plan:     Diagnosis Plan   1. Impaired fasting glucose  Comprehensive Metabolic Panel    Hemoglobin A1c   2. Hyperlipidemia  CK    Comprehensive Metabolic Panel    NMR LipoProfile    TSH    T4, Free    T3, Free   3. Occlusive coronary artery disease, 12/2/2015--cardiac cath: LIMA to LAD (patent); RADHA to RCA (occluded 100%); SVG to obtuse marginal (occluded 100%); SVG to diagonal (occluded 100%).       4. History of PTCA, 7/15/2013--drug-eluting stent proximal and origin of LM.     5. Hx of CABG, 2004--LIMA to LAD; RADHA to RCA; SVG to obtuse marginal; SVG to diagonal.     6. History of stroke     7. History of abdominal aortic aneurysm (AAA), 6/23/2017--endovascular AAA repair with stent.     8. History of gout     9. Panlobular emphysema (CMS/HCC)     10. Multiple pulmonary nodules, 1/29/2019--minimal change in clustered nodularity and tree-in-bud nodularity upper lobes and right middle lobe.  Recommend yearly low-dose CT screening.     11. History of colon polyps  Ambulatory Referral For Screening Colonoscopy   12. Gastroesophageal reflux disease with esophagitis without hemorrhage     13. Macrocytosis  CBC (No Diff)   14. Heart palpitations     15. Vitamin D deficiency  Vitamin D 25 Hydroxy   16. Benign prostatic hyperplasia with weak urinary stream     17. Therapeutic drug monitoring     18. Colon cancer screening       It appears the patient's impaired fasting glucose may have resolved but we will continue to monitor.  Hyperlipidemia is under excellent control with Zetia and Lipitor.  This is particularly important given his coronary artery disease and history of stroke.  These problems seem to be stable and patient is followed by the cardiologist for this as well as heart palpitations which seem to be controlled.  He has a history of AAA repair and is followed by the vascular surgeon.  Gout is stable with normal uric acid levels.  He has emphysema and multiple pulmonary nodules which is  monitored by the pulmonologist.  Patient has a history of colon polyps and is scheduled for colonoscopy in the near future.  Esophageal reflux controlled with Nexium.  Vitamin D is in normal range.  BPH symptoms seem to be controlled on the current medication.    Plan is as follows: No change in current medical regimen.  Patient will follow-up after November 25, 2021 with lab prior and this will also be subsequent Medicare wellness visit.  Otherwise patient will follow-up as needed.        Procedures

## 2021-05-23 DIAGNOSIS — K59.4 PROCTALGIA FUGAX: ICD-10-CM

## 2021-06-02 RX ORDER — DIAZEPAM 5 MG/1
TABLET ORAL
Qty: 30 TABLET | Refills: 0 | Status: SHIPPED | OUTPATIENT
Start: 2021-06-02 | End: 2021-08-30

## 2021-06-10 DIAGNOSIS — J30.1 SEASONAL ALLERGIC RHINITIS DUE TO POLLEN: Chronic | ICD-10-CM

## 2021-06-10 DIAGNOSIS — E78.2 MIXED HYPERLIPIDEMIA: Chronic | ICD-10-CM

## 2021-06-11 RX ORDER — EZETIMIBE 10 MG/1
TABLET ORAL
Qty: 90 TABLET | Refills: 1 | Status: SHIPPED | OUTPATIENT
Start: 2021-06-11 | End: 2021-07-26 | Stop reason: SDUPTHER

## 2021-06-11 RX ORDER — FLUTICASONE PROPIONATE 50 MCG
SPRAY, SUSPENSION (ML) NASAL
Qty: 16 G | Refills: 6 | Status: SHIPPED | OUTPATIENT
Start: 2021-06-11 | End: 2021-07-26 | Stop reason: SDUPTHER

## 2021-07-09 DIAGNOSIS — K21.00 GASTROESOPHAGEAL REFLUX DISEASE WITH ESOPHAGITIS: ICD-10-CM

## 2021-07-12 RX ORDER — CLINDAMYCIN PHOSPHATE 10 MG/G
GEL TOPICAL
Qty: 60 EACH | Refills: 1 | Status: SHIPPED | OUTPATIENT
Start: 2021-07-12 | End: 2022-03-30

## 2021-07-12 RX ORDER — ESOMEPRAZOLE MAGNESIUM 40 MG/1
CAPSULE, DELAYED RELEASE ORAL
Qty: 90 CAPSULE | Refills: 1 | Status: SHIPPED | OUTPATIENT
Start: 2021-07-12 | End: 2022-01-12

## 2021-07-26 DIAGNOSIS — J30.1 SEASONAL ALLERGIC RHINITIS DUE TO POLLEN: Chronic | ICD-10-CM

## 2021-07-26 DIAGNOSIS — E78.2 MIXED HYPERLIPIDEMIA: Chronic | ICD-10-CM

## 2021-07-26 RX ORDER — FLUTICASONE PROPIONATE 50 MCG
SPRAY, SUSPENSION (ML) NASAL
Qty: 16 G | Refills: 6 | Status: SHIPPED | OUTPATIENT
Start: 2021-07-26 | End: 2022-01-03

## 2021-07-26 RX ORDER — EZETIMIBE 10 MG/1
TABLET ORAL
Qty: 90 TABLET | Refills: 1 | Status: SHIPPED | OUTPATIENT
Start: 2021-07-26 | End: 2022-01-12

## 2021-07-26 NOTE — TELEPHONE ENCOUNTER
Caller: [facility] - [place], [place] - [address] [facility] - [phone] [facility] [phone] FX    Relationship: Pharmacy    Best call back number: [phone]    Medication needed:   Requested Prescriptions     Pending Prescriptions Disp Refills   • fluticasone (FLONASE) 50 MCG/ACT nasal spray 16 g 6     Sig: SPRAY TWO SPRAYS IN EACH NOSTRIL ONCE DAILY AS NEEDED   • ezetimibe (ZETIA) 10 MG tablet 90 tablet 1       When do you need the refill by: TODAY    What additional details did the patient provide when requesting the medication: MAIL ORDER PHARMACY CALLED FOR REFILL.    Does the patient have less than a 3 day supply:  [] Yes  [x] No    What is the patient's preferred pharmacy: [address] [phone] [place] [phone] FX<br>[facility] [address] [place] [address] [phone] [facility] [phone] FX<br>[facility] - [place], [place] - [address] [facility] - [phone] [facility] [phone] FX

## 2021-08-16 RX ORDER — ATORVASTATIN CALCIUM 80 MG/1
TABLET, FILM COATED ORAL
Qty: 90 TABLET | Refills: 2 | Status: SHIPPED | OUTPATIENT
Start: 2021-08-16 | End: 2022-03-25

## 2021-08-29 DIAGNOSIS — K59.4 PROCTALGIA FUGAX: ICD-10-CM

## 2021-08-30 RX ORDER — DIAZEPAM 5 MG/1
TABLET ORAL
Qty: 30 TABLET | Refills: 3 | Status: SHIPPED | OUTPATIENT
Start: 2021-08-30 | End: 2022-03-30 | Stop reason: SDUPTHER

## 2021-08-31 DIAGNOSIS — K59.4 PROCTALGIA FUGAX: ICD-10-CM

## 2021-08-31 RX ORDER — DIAZEPAM 5 MG/1
5 TABLET ORAL DAILY PRN
Qty: 30 TABLET | Refills: 3 | OUTPATIENT
Start: 2021-08-31

## 2021-09-29 ENCOUNTER — TELEPHONE (OUTPATIENT)
Dept: INTERNAL MEDICINE | Facility: CLINIC | Age: 71
End: 2021-09-29

## 2021-09-29 NOTE — TELEPHONE ENCOUNTER
----- Message from Vernon Willis sent at 9/29/2021  8:04 AM EDT -----  Regarding: Non-Urgent Medical Question  Contact: [phone]  SARITA LUQUE,,,    9/9/21.   FLUZONE HD QUAD,,,RECIEVED @ [facility], [phone] ( YOU ARE ALREADY AWARE OF)    ADDING,,,    9/24/21 COVID-19, 3RD DOSE, RECIEVED @ [facility], [phone]    PLEASE ENTER BOTH IN [facility] CHART ONLINE.  (IMMUNIZATIONS)  (LAST ENTRIES WERE , COVID-19. 1ST & 2ND DOSE)    THANKS,,HOPE ALL ARE SAFE & WELL,TAKE CARE, VERNON MACDONALD

## 2021-10-26 DIAGNOSIS — E55.9 VITAMIN D DEFICIENCY: Chronic | ICD-10-CM

## 2021-10-26 DIAGNOSIS — D75.89 MACROCYTOSIS: Chronic | ICD-10-CM

## 2021-10-26 DIAGNOSIS — E78.2 MIXED HYPERLIPIDEMIA: Chronic | ICD-10-CM

## 2021-10-26 DIAGNOSIS — R73.01 IMPAIRED FASTING GLUCOSE: Chronic | ICD-10-CM

## 2021-11-03 LAB
25(OH)D3+25(OH)D2 SERPL-MCNC: 60.5 NG/ML (ref 30–100)
ALBUMIN SERPL-MCNC: 4.8 G/DL (ref 3.8–4.8)
ALBUMIN/GLOB SERPL: 2 {RATIO} (ref 1.2–2.2)
ALP SERPL-CCNC: 87 IU/L (ref 44–121)
ALT SERPL-CCNC: 23 IU/L (ref 0–44)
AST SERPL-CCNC: 32 IU/L (ref 0–40)
BILIRUB SERPL-MCNC: 0.3 MG/DL (ref 0–1.2)
BUN SERPL-MCNC: 16 MG/DL (ref 8–27)
BUN/CREAT SERPL: 16 (ref 10–24)
CALCIUM SERPL-MCNC: 10 MG/DL (ref 8.6–10.2)
CHLORIDE SERPL-SCNC: 103 MMOL/L (ref 96–106)
CHOLEST SERPL-MCNC: 124 MG/DL (ref 100–199)
CK SERPL-CCNC: 175 U/L (ref 41–331)
CO2 SERPL-SCNC: 21 MMOL/L (ref 20–29)
CREAT SERPL-MCNC: 0.97 MG/DL (ref 0.76–1.27)
ERYTHROCYTE [DISTWIDTH] IN BLOOD BY AUTOMATED COUNT: 11.7 % (ref 11.6–15.4)
GLOBULIN SER CALC-MCNC: 2.4 G/DL (ref 1.5–4.5)
GLUCOSE SERPL-MCNC: 114 MG/DL (ref 65–99)
HBA1C MFR BLD: 5.7 % (ref 4.8–5.6)
HCT VFR BLD AUTO: 40.7 % (ref 37.5–51)
HDL SERPL-SCNC: 34.6 UMOL/L
HDLC SERPL-MCNC: 47 MG/DL
HGB BLD-MCNC: 14.1 G/DL (ref 13–17.7)
LDL SERPL QN: 19.9 NM
LDL SERPL-SCNC: 924 NMOL/L
LDL SMALL SERPL-SCNC: 604 NMOL/L
LDLC SERPL CALC-MCNC: 63 MG/DL (ref 0–99)
MCH RBC QN AUTO: 33.2 PG (ref 26.6–33)
MCHC RBC AUTO-ENTMCNC: 34.6 G/DL (ref 31.5–35.7)
MCV RBC AUTO: 96 FL (ref 79–97)
PLATELET # BLD AUTO: 189 X10E3/UL (ref 150–450)
POTASSIUM SERPL-SCNC: 5.3 MMOL/L (ref 3.5–5.2)
PROT SERPL-MCNC: 7.2 G/DL (ref 6–8.5)
RBC # BLD AUTO: 4.25 X10E6/UL (ref 4.14–5.8)
SODIUM SERPL-SCNC: 145 MMOL/L (ref 134–144)
T3FREE SERPL-MCNC: 3.7 PG/ML (ref 2–4.4)
T4 FREE SERPL-MCNC: 1.23 NG/DL (ref 0.82–1.77)
TRIGL SERPL-MCNC: 67 MG/DL (ref 0–149)
TSH SERPL DL<=0.005 MIU/L-ACNC: 1.15 UIU/ML (ref 0.45–4.5)
WBC # BLD AUTO: 10.3 X10E3/UL (ref 3.4–10.8)

## 2021-11-16 ENCOUNTER — OFFICE VISIT (OUTPATIENT)
Dept: INTERNAL MEDICINE | Facility: CLINIC | Age: 71
End: 2021-11-16

## 2021-11-16 VITALS
RESPIRATION RATE: 18 BRPM | OXYGEN SATURATION: 98 % | WEIGHT: 173 LBS | BODY MASS INDEX: 25.62 KG/M2 | DIASTOLIC BLOOD PRESSURE: 60 MMHG | SYSTOLIC BLOOD PRESSURE: 128 MMHG | HEART RATE: 79 BPM | HEIGHT: 69 IN

## 2021-11-16 DIAGNOSIS — I25.10 OCCLUSIVE CORONARY ARTERY DISEASE: Chronic | ICD-10-CM

## 2021-11-16 DIAGNOSIS — R39.12 BENIGN PROSTATIC HYPERPLASIA WITH WEAK URINARY STREAM: Chronic | ICD-10-CM

## 2021-11-16 DIAGNOSIS — J43.1 PANLOBULAR EMPHYSEMA (HCC): Chronic | ICD-10-CM

## 2021-11-16 DIAGNOSIS — Z98.61 HISTORY OF PTCA: Chronic | ICD-10-CM

## 2021-11-16 DIAGNOSIS — R91.8 MULTIPLE PULMONARY NODULES: Chronic | ICD-10-CM

## 2021-11-16 DIAGNOSIS — E55.9 VITAMIN D DEFICIENCY: Chronic | ICD-10-CM

## 2021-11-16 DIAGNOSIS — N40.1 BENIGN PROSTATIC HYPERPLASIA WITH WEAK URINARY STREAM: Chronic | ICD-10-CM

## 2021-11-16 DIAGNOSIS — Z51.81 THERAPEUTIC DRUG MONITORING: ICD-10-CM

## 2021-11-16 DIAGNOSIS — D75.89 MACROCYTOSIS: Chronic | ICD-10-CM

## 2021-11-16 DIAGNOSIS — R00.2 HEART PALPITATIONS: Chronic | ICD-10-CM

## 2021-11-16 DIAGNOSIS — K21.00 GASTROESOPHAGEAL REFLUX DISEASE WITH ESOPHAGITIS WITHOUT HEMORRHAGE: Chronic | ICD-10-CM

## 2021-11-16 DIAGNOSIS — Z86.73 HISTORY OF STROKE: Chronic | ICD-10-CM

## 2021-11-16 DIAGNOSIS — R73.01 IMPAIRED FASTING GLUCOSE: Primary | Chronic | ICD-10-CM

## 2021-11-16 DIAGNOSIS — Z87.891 FORMER CIGARETTE SMOKER: Chronic | ICD-10-CM

## 2021-11-16 DIAGNOSIS — Z87.39 HISTORY OF GOUT: Chronic | ICD-10-CM

## 2021-11-16 DIAGNOSIS — E78.2 MIXED HYPERLIPIDEMIA: Chronic | ICD-10-CM

## 2021-11-16 DIAGNOSIS — Z86.010 HISTORY OF COLON POLYPS: Chronic | ICD-10-CM

## 2021-11-16 DIAGNOSIS — Z95.1 HX OF CABG: Chronic | ICD-10-CM

## 2021-11-16 DIAGNOSIS — Z86.79 HISTORY OF ABDOMINAL AORTIC ANEURYSM (AAA): Chronic | ICD-10-CM

## 2021-11-16 PROCEDURE — 99214 OFFICE O/P EST MOD 30 MIN: CPT | Performed by: INTERNAL MEDICINE

## 2021-11-16 NOTE — PROGRESS NOTES
11/16/2021    Patient Information  Vernon Willis                                                                                          [address]  [facility] [address]      1950  [unfilled]  There is no work phone number on file.    Chief Complaint:     Follow-up lab work in order to monitor chronic medical issues listed in history of present illness.  No new acute complaints.    History of Present Illness:    Patient with a history of impaired fasting glucose, hyperlipidemia, occlusive coronary artery disease, history of PTCA and CABG, history of AAA repair, history of stroke, gout, vitamin D deficiency, symptomatic BPH, emphysema, multiple pulmonary nodules, history of colon polyps, esophageal reflux, macrocytosis, heart palpitations controlled with metoprolol.  He presents today for a follow-up with lab prior in order to monitor his chronic medical issues.  Patient is due for his subsequent Medicare wellness visit in less than 10 days and when we tried to reschedule him he indicates that he will be out of town in [place].  See below.  Past medical history reviewed and updated were necessary including health maintenance parameters.  This reveals he will be up-to-date or else accounted for after today's visit.    Review of Systems   Constitutional: Negative.   HENT: Negative.    Eyes: Negative.    Cardiovascular: Negative.    Respiratory: Negative.    Endocrine: Negative.    Hematologic/Lymphatic: Negative.    Skin: Negative.    Musculoskeletal: Negative.    Gastrointestinal: Negative.    Genitourinary: Negative.    Neurological: Negative.    Psychiatric/Behavioral: Negative.    Allergic/Immunologic: Negative.        Active Problems:    Patient Active Problem List   Diagnosis   • History of abdominal aortic aneurysm (AAA), 6/23/2017--endovascular AAA repair with stent.   • Occlusive coronary artery disease, 12/2/2015--cardiac cath: LIMA to LAD (patent); RADHA to RCA (occluded 100%); SVG  to obtuse marginal (occluded 100%); SVG to diagonal (occluded 100%).     • Hyperlipidemia   • History of gout   • Chronic allergic conjunctivitis   • Vitamin D deficiency   • Benign prostatic hyperplasia with weak urinary stream   • Therapeutic drug monitoring   • History of stroke   • History of PTCA, 7/15/2013--drug-eluting stent proximal and origin of LM.   • Hx of CABG, 2004--LIMA to LAD; RADHA to RCA; SVG to obtuse marginal; SVG to diagonal.   • Pulmonary emphysema (HCC)   • Multiple pulmonary nodules, 3/17/2021--stable 3.1 millimeter left upper lobe nodule.  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  Yearly screening recommen   • History of colon polyps   • Genital warts   • Allergic rhinitis   • Gastroesophageal reflux disease with esophagitis   • Proctalgia fugax   • Macrocytosis   • Impaired fasting glucose   • Heart palpitations   • Former cigarette smoker         Past Medical History:   Diagnosis Date   • Allergic rhinitis 8/13/2019   • Benign prostatic hyperplasia with weak urinary stream 8/13/2019   • Chronic allergic conjunctivitis 8/13/2019   • Cigarette nicotine dependence  8/13/2019   • Former cigarette smoker 11/16/2021   • Gastroesophageal reflux disease with esophagitis 8/13/2019   • Genital warts 8/13/2019   • Heart palpitations 11/25/2020   • History of abdominal aortic aneurysm (AAA), 6/23/2017--endovascular AAA repair with stent. 8/13/2019 August 1, 2017--CTA of the abdomen: There has been endovascular repair of the abdominal aortic aneurysm with stent extending from just below the origin of the left renal vein into both common iliac arteries. No evidence for endoleak on arterial phase imaging. The excluded aneurysmal sac measures 4.8 cm in maximal diameter, previously 5.0 cm. Aortoiliac atherosclerosis with moderate narrowing at th   • History of cardiac catheterization 8/13/2019 December 2, 2015--cardiac catheterization.  INDICATION(S):  This is a 64-year-old male with a history of coronary artery disease status post coronary artery bypass grafting. He presented to Dr. Clarke with symptoms consistent with his prior angina. He underwent a nuclear stress test that showed a mid-anterior wall myocardial infarction with a small amount of ischemia in the ventricular apex. He was s   • History of colon polyps 8/13/2019 February 2016--colonoscopy with polypectomy.  Pathology not known.  2010--colonoscopy revealed a serrated adenoma.   • History of gout 8/13/2019   • History of posterior vitreous detachment of right eye, 10/15/2016--repair. 8/13/2019 October 5, 2016--vitrectomy, membrane peel, panretinal endophotocoagulation laser, right eye for preretinal membrane, vitreous hemorrhage, and posterior vitreous detachment of right eye.        • History of PTCA, 7/15/2013--drug-eluting stent proximal and origin of LM. 8/13/2019    July 15, 2013--successful drug-eluting stent PCI to the proximal and origin of the left main using vascular ultrasound guidance.   • History of stroke, questionable. 8/13/2019          • Hx of CABG, 2004--LIMA to LAD; RADHA to RCA; SVG to obtuse marginal; SVG to diagonal. 8/13/2019 December 2, 2015--cardiac catheterization: LIMA to LAD (patent); RADHA to RCA (occluded 100%); SVG to obtuse marginal (occluded 100%); SVG to diagonal (occluded 100%).  2004--four-vessel CABG.  LIMA to LAD; RADHA to RCA; SVG to obtuse marginal; SVG to diagonal.   • Hyperlipidemia 8/13/2019   • Impaired fasting glucose 5/21/2020   • Macrocytosis 8/13/2019 July 25, 2019--MCV elevated at 102.2.  Vitamin B12 level was above normal at 1208.  Methylmalonic acid was not performed.   • Multiple pulmonary nodules, 3/17/2021--stable 3.1 millimeter left upper lobe nodule.  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  Yearly screening recommen 8/13/2019 March 17, 2021--CT scan of the chest reveals a solitary stable  left upper lobe 3.5 mm nodule.  Persistent reticulonodular interstitial disease in the right middle lobe likely to be RB-ILD in this patient with smoking history and not neoplastic.  Patient is to return to annual lung screening.  Thus, Fleischner's criteria not applied.  January 29, 2019--CT scan of the chest without contrast performe   • Occlusive coronary artery disease, 12/2/2015--cardiac cath: LIMA to LAD (patent); RADHA to RCA (occluded 100%); SVG to obtuse marginal (occluded 100%); SVG to diagonal (occluded 100%).   8/13/2019 December 2, 2015--cardiac catheterization: LIMA to LAD (patent); RADHA to RCA (occluded 100%); SVG to obtuse marginal (occluded 100%); SVG to diagonal (occluded 100%).  July 15, 2013--successful drug-eluting stent PCI to the proximal and origin of the left main using vascular ultrasound guidance.  2004--four-vessel CABG.  LIMA to LAD; RADHA to RCA; SVG to obtuse marginal; SVG to diagonal.   HPI: Fra   • Proctalgia fugax 8/13/2019   • Pulmonary emphysema (HCC) 8/13/2019 January 29, 2019--CT scan of the chest without contrast performed for abnormal CT scan reveals minimal change in clustered nodularity and tree-in-bud nodularity involving dominantly the upper lobes and right middle lobe, likely postinfectious or postinflammatory.  A few new areas of peripheral mucus plugging noted at the right lower lobe.  No new or enlarging pulmonary nodules.  Return to annual s   • Vitamin D deficiency 8/13/2019         Past Surgical History:   Procedure Laterality Date   • ABDOMINAL AORTIC ANEURYSM REPAIR W/ ENDOLUMINAL GRAFT  06/23/2017 June 23, 2017--endovascular repair of AAA.   • CARDIAC CATHETERIZATION  12/02/2015 December 2, 2015--cardiac catheterization.  See past medical history for details.   • CATARACT EXTRACTION, BILATERAL     • COLONOSCOPY  2010 2010--colonoscopy revealed a serrated adenoma.   • COLONOSCOPY W/ POLYPECTOMY  02/2016 February 2016--colonoscopy with polypectomy.   Pathology not known.   • CORONARY ANGIOPLASTY WITH STENT PLACEMENT  07/15/2013    July 15, 2013--successful drug-eluting stent PCI to the proximal and origin of the left main using vascular ultrasound guidance.   • CORONARY ARTERY BYPASS GRAFT  2004 2004--four-vessel CABG.  LIMA to LAD; RADHA to RCA; SVG to obtuse marginal; SVG to diagonal.   • PARS PLANA VITRECTOMY W/ ENDOPHOTOCOAGULATION  10/05/2016    October 5, 2016--vitrectomy, membrane peel, panretinal endophotocoagulation laser, right eye for preretinal membrane, vitreous hemorrhage, and posterior vitreous detachment of right eye.   • UPPER GASTROINTESTINAL ENDOSCOPY  02/2016 February 2016--EGD reportedly revealed esophagitis and gastritis.         Allergies   Allergen Reactions   • Codeine Hives   • Msg [Monosodium Glutamate] Other (See Comments)     Passes out   • Penicillins Hives           Current Outpatient Medications:   •  ascorbic acid (VITAMIN C) 1000 MG tablet, , Disp: , Rfl:   •  aspirin 81 MG tablet, Take 81 mg by mouth Daily., Disp: , Rfl:   •  atorvastatin (LIPITOR) 80 MG tablet, TAKE 1 TABLET EVERY DAY FOR HIGH CHOLESTEROL, Disp: 90 tablet, Rfl: 2  •  chlorhexidine (Paroex) 0.12 % solution, Apply 15 mL to the mouth or throat 2 (Two) Times a Day., Disp: 6 each, Rfl: 3  •  Cholecalciferol (VITAMIN D3) 5000 units capsule capsule, Take  by mouth., Disp: , Rfl:   •  clindamycin (CLINDAGEL) 1 % gel, APPLY TO AFFECTED AREA(S) TWO TIMES A DAY AS DIRECTED, Disp: 60 each, Rfl: 1  •  clopidogrel (PLAVIX) 75 MG tablet, Take 1 p.o. daily for blood thinner, Disp: 30 tablet, Rfl:   •  Cyanocobalamin (B-12) 1000 MCG tablet, Take 1 tablet by mouth Daily., Disp: , Rfl:   •  desonide (DESOWEN) 0.05 % cream, Apply  topically to the appropriate area as directed., Disp: , Rfl:   •  diazePAM (VALIUM) 5 MG tablet, TAKE ONE TABLET BY MOUTH DAILY AS NEEDED, Disp: 30 tablet, Rfl: 3  •  Ergocalciferol (VITAMIN D2) 2000 units tablet, Take 1 tablet by mouth Daily.,  Disp: , Rfl:   •  erythromycin (ROMYCIN) 5 MG/GM ophthalmic ointment, PUT ON QTIP AND APPLY TO EYELID AT BEDTIME, Disp: , Rfl:   •  esomeprazole (nexIUM) 40 MG capsule, TAKE ONE CAPSULE BY MOUTH DAILY BEFORE FIRST MEAL FOR REFLUX, Disp: 90 capsule, Rfl: 1  •  ezetimibe (ZETIA) 10 MG tablet, Take 1 p.o. daily for high cholesterol, Disp: 90 tablet, Rfl: 1  •  fluorometholone (FML) 0.1 % ophthalmic suspension, INSTILL 1 DROP INTO EACH EYE TWICE DAILY, Disp: , Rfl: 5  •  fluticasone (FLONASE) 50 MCG/ACT nasal spray, SPRAY TWO SPRAYS IN EACH NOSTRIL ONCE DAILY AS NEEDED, Disp: 16 g, Rfl: 6  •  hydrocortisone 2.5 % cream, APPLY TO AFFECTED AREA(S) TWO TIMES A DAY AS DIRECTED, Disp: 30 g, Rfl: 2  •  metoprolol succinate XL (TOPROL-XL) 25 MG 24 hr tablet, TAKE 1 TABLET BY MOUTH DAILY ALONG WITH THE 50MG TABLET AS NEEDED FOR PALPITATIONS., Disp: , Rfl:   •  metoprolol succinate XL (TOPROL-XL) 50 MG 24 hr tablet, , Disp: , Rfl:   •  podofilox (CONDYLOX) 0.5 % external solution, Apply  topically to the appropriate area as directed., Disp: , Rfl:   •  Sodium Fluoride (PreviDent 5000 Booster Plus) 1.1 % paste, Use as directed orally twice daily, Disp: 200 mL, Rfl: 11  •  tamsulosin (FLOMAX) 0.4 MG capsule 24 hr capsule, Take 1 p.o. twice daily for prostate, Disp: 60 capsule, Rfl: 6  •  Vitamin A 2400 MCG (8000 UT) capsule, Take  by mouth Daily., Disp: , Rfl:   •  Zinc 100 MG tablet, , Disp: , Rfl:       Family History   Problem Relation Age of Onset   • Diabetes type II Mother    • Coronary artery disease Father    • Diabetes type II Father    • Kidney disease Father    • Hypertension Father    • Crohn's disease Sister    • Coronary artery disease Brother         Brother  of a myocardial infarction at age 53         Social History     Socioeconomic History   • Marital status:    Tobacco Use   • Smoking status: Former Smoker     Packs/day: 0.50     Types: Cigarettes     Start date:      Quit date: 2020      "Years since quittin.8   • Smokeless tobacco: Never Used   Vaping Use   • Vaping Use: Never used   Substance and Sexual Activity   • Alcohol use: No   • Drug use: No   • Sexual activity: Not Currently     Partners: Female         Vitals:    21 1113   BP: 128/60   Pulse: 79   Resp: 18   SpO2: 98%   Weight: 78.5 kg (173 lb)   Height: 175.3 cm (69\")        Body mass index is 25.55 kg/m².      Physical Exam:    General: Alert and oriented x 3.  No acute distress.  Normal affect.  HEENT: Pupils equal, round, reactive to light; extraocular movements intact; sclerae nonicteric; pharynx, ear canals and TMs normal.  Neck: Without JVD, thyromegaly, bruit, or adenopathy.  Lungs: Clear to auscultation in all fields.  Heart: Regular rate and rhythm without murmur, rub, gallop, or click.  Abdomen: Soft, nontender, without hepatosplenomegaly or hernia.  Bowel sounds normal.  : Deferred.  Rectal: Deferred.  Extremities: Without clubbing, cyanosis, edema, or pulse deficit.  Neurologic: Intact without focal deficit.  Normal station and gait observed during ingress and egress from the examination room.  Skin: Without significant lesion.  Musculoskeletal: Unremarkable.    Lab/other results:    CBC normal.  CPK normal.  CMP normal except glucose 114, sodium elevated 145, potassium elevated at 5.3.  Hemoglobin A1c 5.7.  NMR profile reveals a total cholesterol of 124.  Triglycerides 67.  LDL particle number excellent 924.  Small LDL particle number mildly elevated at 604.  HDL particle number normal at 34.6.  Thyroid function test normal.  Vitamin D normal.    Assessment/Plan:     Diagnosis Plan   1. Impaired fasting glucose  Comprehensive Metabolic Panel    Hemoglobin A1c   2. Hyperlipidemia  CK    NMR LipoProfile    TSH    T4, Free    T3, Free   3. Occlusive coronary artery disease, 2015--cardiac cath: LIMA to LAD (patent); RADHA to RCA (occluded 100%); SVG to obtuse marginal (occluded 100%); SVG to diagonal (occluded " 100%).       4. History of PTCA, 7/15/2013--drug-eluting stent proximal and origin of LM.     5. Hx of CABG, 2004--LIMA to LAD; RADHA to RCA; SVG to obtuse marginal; SVG to diagonal.     6. History of abdominal aortic aneurysm (AAA), 6/23/2017--endovascular AAA repair with stent.     7. History of stroke     8. History of gout  Uric Acid   9. Vitamin D deficiency  Vitamin D 25 Hydroxy   10. Benign prostatic hyperplasia with weak urinary stream  PSA DIAGNOSTIC   11. Panlobular emphysema (HCC)     12. Multiple pulmonary nodules, 1/29/2019--minimal change in clustered nodularity and tree-in-bud nodularity upper lobes and right middle lobe.  Recommend yearly low-dose CT screening.     13. History of colon polyps     14. Gastroesophageal reflux disease with esophagitis without hemorrhage     15. Macrocytosis  CBC (No Diff)   16. Heart palpitations     17. Therapeutic drug monitoring  CBC (No Diff)   18. Former cigarette smoker       Patient has mild impaired fasting glucose that does not require medication.  Strongly recommend low carbohydrate diet and attainment of ideal body weight.  Hyperlipidemia is under excellent control which is important given his coronary artery disease, history of PTCA and CABG, history of AAA repair and history of stroke.  All of these are stable.  He has had no episodes of gout in the recent years.  Vitamin D is in the normal range.  Symptomatic BPH is treated with Flomax which is helpful.  He has emphysema and at least 1 pulmonary nodules as well as reticular nodular infiltrates that seem to be stable.  He will need a repeat scan of his chest when he returns from [place] next spring.  He has a history of colon polyps and I ordered a colonoscopy 1 year ago.  However, patient never had this done.  Reflux seems controlled.  Macrocytosis is not evident on today's lab and previous work-up was negative for B12 or folic acid deficiency.  Heart palpitations controlled with beta-blockade.    Plan is  as follows: No change in current medical regimen.  Patient will work on low carbohydrate diet and exercise and attainment of ideal body weight.  I will have him follow-up next spring when he gets back from [place] and this will be his subsequent Medicare wellness visit as well as lab and follow-up.      Procedures

## 2021-12-02 RX ORDER — CHLORHEXIDINE GLUCONATE 1.2 MG/ML
RINSE BUCCAL
Qty: 2838 ML | Refills: 2 | Status: SHIPPED | OUTPATIENT
Start: 2021-12-02 | End: 2022-07-14 | Stop reason: SDUPTHER

## 2022-01-01 DIAGNOSIS — J30.1 SEASONAL ALLERGIC RHINITIS DUE TO POLLEN: Chronic | ICD-10-CM

## 2022-01-03 RX ORDER — FLUTICASONE PROPIONATE 50 MCG
SPRAY, SUSPENSION (ML) NASAL
Qty: 16 G | Refills: 6 | Status: SHIPPED | OUTPATIENT
Start: 2022-01-03 | End: 2022-06-13

## 2022-01-12 DIAGNOSIS — E78.2 MIXED HYPERLIPIDEMIA: Chronic | ICD-10-CM

## 2022-01-12 DIAGNOSIS — K21.00 GASTROESOPHAGEAL REFLUX DISEASE WITH ESOPHAGITIS: ICD-10-CM

## 2022-01-12 RX ORDER — EZETIMIBE 10 MG/1
TABLET ORAL
Qty: 90 TABLET | Refills: 2 | Status: SHIPPED | OUTPATIENT
Start: 2022-01-12 | End: 2022-07-29 | Stop reason: SDUPTHER

## 2022-01-12 RX ORDER — ESOMEPRAZOLE MAGNESIUM 40 MG/1
CAPSULE, DELAYED RELEASE ORAL
Qty: 90 CAPSULE | Refills: 1 | Status: SHIPPED | OUTPATIENT
Start: 2022-01-12 | End: 2022-07-15

## 2022-03-25 RX ORDER — ATORVASTATIN CALCIUM 80 MG/1
TABLET, FILM COATED ORAL
Qty: 90 TABLET | Refills: 2 | Status: SHIPPED | OUTPATIENT
Start: 2022-03-25 | End: 2022-10-27

## 2022-03-30 DIAGNOSIS — K59.4 PROCTALGIA FUGAX: ICD-10-CM

## 2022-03-30 RX ORDER — CLINDAMYCIN PHOSPHATE 10 MG/G
GEL TOPICAL
Qty: 60 G | Refills: 5 | Status: SHIPPED | OUTPATIENT
Start: 2022-03-30

## 2022-03-31 RX ORDER — DIAZEPAM 5 MG/1
5 TABLET ORAL DAILY PRN
Qty: 30 TABLET | Refills: 3 | Status: SHIPPED | OUTPATIENT
Start: 2022-03-31 | End: 2022-11-02

## 2022-04-13 ENCOUNTER — TELEPHONE (OUTPATIENT)
Dept: INTERNAL MEDICINE | Facility: CLINIC | Age: 72
End: 2022-04-13

## 2022-04-13 NOTE — TELEPHONE ENCOUNTER
Caller: [address] [address] [facility] - [phone]  - [phone]    Relationship: Pharmacy    Best call back number:[phone]      What is the best time to reach you: ANYTIME     Who are you requesting to speak with (clinical staff, provider,  specific staff member): CLINICAL STAFF       What was the call regarding: PATIENT WANTED PHARMACY TO CALL US AND LET DR HOLCOMB KNOW THAT HE HAS RECEIVED HIS 2ND COVID 19 BOOSTER AT [facility].

## 2022-04-28 LAB
25(OH)D3+25(OH)D2 SERPL-MCNC: 50.1 NG/ML (ref 30–100)
CHOLEST SERPL-MCNC: 131 MG/DL (ref 100–199)
CK SERPL-CCNC: 135 U/L (ref 41–331)
ERYTHROCYTE [DISTWIDTH] IN BLOOD BY AUTOMATED COUNT: 12.5 % (ref 11.6–15.4)
HBA1C MFR BLD: 5.7 % (ref 4.8–5.6)
HCT VFR BLD AUTO: 37.4 % (ref 37.5–51)
HDL SERPL-SCNC: 31.4 UMOL/L
HDLC SERPL-MCNC: 48 MG/DL
HGB BLD-MCNC: 12.5 G/DL (ref 13–17.7)
LDL SERPL QN: 20.4 NM
LDL SERPL-SCNC: 944 NMOL/L
LDL SMALL SERPL-SCNC: 515 NMOL/L
LDLC SERPL CALC-MCNC: 69 MG/DL (ref 0–99)
MCH RBC QN AUTO: 33.1 PG (ref 26.6–33)
MCHC RBC AUTO-ENTMCNC: 33.4 G/DL (ref 31.5–35.7)
MCV RBC AUTO: 99 FL (ref 79–97)
PLATELET # BLD AUTO: 182 X10E3/UL (ref 150–450)
PSA SERPL-MCNC: 0.3 NG/ML (ref 0–4)
RBC # BLD AUTO: 3.78 X10E6/UL (ref 4.14–5.8)
T3FREE SERPL-MCNC: 3.3 PG/ML (ref 2–4.4)
T4 FREE SERPL-MCNC: 1.18 NG/DL (ref 0.82–1.77)
TRIGL SERPL-MCNC: 69 MG/DL (ref 0–149)
TSH SERPL DL<=0.005 MIU/L-ACNC: 1.18 UIU/ML (ref 0.45–4.5)
URATE SERPL-MCNC: 5.1 MG/DL (ref 3.8–8.4)
WBC # BLD AUTO: 7 X10E3/UL (ref 3.4–10.8)

## 2022-05-02 ENCOUNTER — OFFICE VISIT (OUTPATIENT)
Dept: INTERNAL MEDICINE | Facility: CLINIC | Age: 72
End: 2022-05-02

## 2022-05-02 VITALS
HEIGHT: 65 IN | BODY MASS INDEX: 26.69 KG/M2 | WEIGHT: 160.2 LBS | OXYGEN SATURATION: 99 % | RESPIRATION RATE: 18 BRPM | SYSTOLIC BLOOD PRESSURE: 138 MMHG | DIASTOLIC BLOOD PRESSURE: 64 MMHG | HEART RATE: 77 BPM

## 2022-05-02 DIAGNOSIS — E66.3 OVERWEIGHT (BMI 25.0-29.9): Chronic | ICD-10-CM

## 2022-05-02 DIAGNOSIS — R91.8 MULTIPLE PULMONARY NODULES: Chronic | ICD-10-CM

## 2022-05-02 DIAGNOSIS — N40.1 BENIGN PROSTATIC HYPERPLASIA WITH WEAK URINARY STREAM: Chronic | ICD-10-CM

## 2022-05-02 DIAGNOSIS — D75.89 MACROCYTOSIS: Chronic | ICD-10-CM

## 2022-05-02 DIAGNOSIS — K59.4 PROCTALGIA FUGAX: Chronic | ICD-10-CM

## 2022-05-02 DIAGNOSIS — R39.12 BENIGN PROSTATIC HYPERPLASIA WITH WEAK URINARY STREAM: Chronic | ICD-10-CM

## 2022-05-02 DIAGNOSIS — K21.00 GASTROESOPHAGEAL REFLUX DISEASE WITH ESOPHAGITIS WITHOUT HEMORRHAGE: Chronic | ICD-10-CM

## 2022-05-02 DIAGNOSIS — E78.2 MIXED HYPERLIPIDEMIA: Chronic | ICD-10-CM

## 2022-05-02 DIAGNOSIS — Z98.61 HISTORY OF PTCA: Chronic | ICD-10-CM

## 2022-05-02 DIAGNOSIS — I25.10 OCCLUSIVE CORONARY ARTERY DISEASE: Chronic | ICD-10-CM

## 2022-05-02 DIAGNOSIS — J43.1 PANLOBULAR EMPHYSEMA: Chronic | ICD-10-CM

## 2022-05-02 DIAGNOSIS — Z87.891 FORMER CIGARETTE SMOKER: Chronic | ICD-10-CM

## 2022-05-02 DIAGNOSIS — Z87.39 HISTORY OF GOUT: Chronic | ICD-10-CM

## 2022-05-02 DIAGNOSIS — Z00.00 ENCOUNTER FOR SUBSEQUENT ANNUAL WELLNESS VISIT (AWV) IN MEDICARE PATIENT: Primary | ICD-10-CM

## 2022-05-02 DIAGNOSIS — Z95.1 HX OF CABG: Chronic | ICD-10-CM

## 2022-05-02 DIAGNOSIS — I65.23 BILATERAL CAROTID ARTERY STENOSIS: ICD-10-CM

## 2022-05-02 DIAGNOSIS — R00.2 HEART PALPITATIONS: Chronic | ICD-10-CM

## 2022-05-02 DIAGNOSIS — R73.01 IMPAIRED FASTING GLUCOSE: Chronic | ICD-10-CM

## 2022-05-02 DIAGNOSIS — Z86.79 HISTORY OF ABDOMINAL AORTIC ANEURYSM (AAA): Chronic | ICD-10-CM

## 2022-05-02 DIAGNOSIS — Z12.11 COLON CANCER SCREENING: ICD-10-CM

## 2022-05-02 DIAGNOSIS — Z86.73 HISTORY OF STROKE: Chronic | ICD-10-CM

## 2022-05-02 DIAGNOSIS — E55.9 VITAMIN D DEFICIENCY: Chronic | ICD-10-CM

## 2022-05-02 DIAGNOSIS — Z86.010 HISTORY OF COLON POLYPS: Chronic | ICD-10-CM

## 2022-05-02 PROCEDURE — 96160 PT-FOCUSED HLTH RISK ASSMT: CPT | Performed by: INTERNAL MEDICINE

## 2022-05-02 PROCEDURE — 99214 OFFICE O/P EST MOD 30 MIN: CPT | Performed by: INTERNAL MEDICINE

## 2022-05-02 PROCEDURE — 1159F MED LIST DOCD IN RCRD: CPT | Performed by: INTERNAL MEDICINE

## 2022-05-02 PROCEDURE — 1170F FXNL STATUS ASSESSED: CPT | Performed by: INTERNAL MEDICINE

## 2022-05-02 PROCEDURE — 1126F AMNT PAIN NOTED NONE PRSNT: CPT | Performed by: INTERNAL MEDICINE

## 2022-05-02 PROCEDURE — G0439 PPPS, SUBSEQ VISIT: HCPCS | Performed by: INTERNAL MEDICINE

## 2022-05-02 NOTE — PROGRESS NOTES
05/02/2022    Patient Information  Vernon Willis                                                                                          [address]  [facility] [address]      1950  [unfilled]  There is no work phone number on file.    Chief Complaint:     Medicare wellness visit.  Follow-up lab work in order to monitor chronic medical issues listed in history of present illness.  No new acute complaints.    History of Present Illness:    Patient with multiple chronic medical problems including impaired fasting glucose, hyperlipidemia, history of gout, occlusive coronary artery disease, status post PTCA and CABG, history of AAA repair, carotid artery stenosis, history of stroke, vitamin D deficiency, symptomatic BPH, emphysema and multiple pulmonary nodules, history of colon polyps, esophageal reflux, proctalgia fugax, macrocytosis, history of heart palpitations, former cigarette smoker.  He presents today for subsequent Medicare wellness visit.  Patient also had lab work in order to monitor his chronic medical issues.  Past medical history reviewed and updated were necessary including health maintenance parameters.  This reveals he is due colon cancer screening.    Review of Systems   Constitutional: Negative.   HENT: Negative.    Eyes: Negative.    Cardiovascular: Negative.    Respiratory: Negative.    Endocrine: Negative.    Hematologic/Lymphatic: Negative.    Skin: Negative.    Musculoskeletal: Negative.    Gastrointestinal: Negative.    Genitourinary: Negative.    Neurological: Negative.    Psychiatric/Behavioral: Negative.    Allergic/Immunologic: Negative.        Active Problems:    Patient Active Problem List   Diagnosis   • History of abdominal aortic aneurysm (AAA), 6/23/2017--endovascular AAA repair with stent.   • Occlusive coronary artery disease, 12/2/2015--cardiac cath: LIMA to LAD (patent); RADHA to RCA (occluded 100%); SVG to obtuse marginal (occluded 100%); SVG to diagonal  (occluded 100%).     • Hyperlipidemia   • History of gout   • Chronic allergic conjunctivitis   • Vitamin D deficiency   • Benign prostatic hyperplasia with weak urinary stream   • Therapeutic drug monitoring   • History of stroke   • History of PTCA, 7/15/2013--drug-eluting stent proximal and origin of LM.   • Hx of CABG, 2004--LIMA to LAD; RADHA to RCA; SVG to obtuse marginal; SVG to diagonal.   • Pulmonary emphysema (HCC)   • Multiple pulmonary nodules, 3/17/2021--stable 3.1 millimeter left upper lobe nodule.  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  Yearly screening recommen   • History of colon polyps   • Genital warts   • Allergic rhinitis   • Gastroesophageal reflux disease with esophagitis   • Proctalgia fugax   • Macrocytosis   • Impaired fasting glucose   • Heart palpitations   • Former cigarette smoker   • Bilateral carotid artery stenosis, 7/1/2021--60-70% right; 50-60% left.   • Overweight (BMI 25.0-29.9)         Past Medical History:   Diagnosis Date   • Allergic rhinitis 8/13/2019   • Benign prostatic hyperplasia with weak urinary stream 8/13/2019   • Bilateral carotid artery stenosis, 7/1/2021--60-70% right; 50-60% left. 11/22/2021 July 1, 2021--carotid Doppler study reveals 60-70% right ICA stenosis; 50-60% left ICA stenosis.   • Chronic allergic conjunctivitis 8/13/2019   • Cigarette nicotine dependence  8/13/2019   • Former cigarette smoker 11/16/2021   • Gastroesophageal reflux disease with esophagitis 8/13/2019   • Genital warts 8/13/2019   • Heart palpitations 11/25/2020   • History of abdominal aortic aneurysm (AAA), 6/23/2017--endovascular AAA repair with stent. 8/13/2019 August 1, 2017--CTA of the abdomen: There has been endovascular repair of the abdominal aortic aneurysm with stent extending from just below the origin of the left renal vein into both common iliac arteries. No evidence for endoleak on arterial phase imaging. The excluded  aneurysmal sac measures 4.8 cm in maximal diameter, previously 5.0 cm. Aortoiliac atherosclerosis with moderate narrowing at th   • History of cardiac catheterization 8/13/2019 December 2, 2015--cardiac catheterization.  INDICATION(S): This is a 64-year-old male with a history of coronary artery disease status post coronary artery bypass grafting. He presented to Dr. Clarke with symptoms consistent with his prior angina. He underwent a nuclear stress test that showed a mid-anterior wall myocardial infarction with a small amount of ischemia in the ventricular apex. He was s   • History of colon polyps 8/13/2019 February 2016--colonoscopy with polypectomy.  Pathology not known.  2010--colonoscopy revealed a serrated adenoma.   • History of gout 8/13/2019   • History of posterior vitreous detachment of right eye, 10/15/2016--repair. 8/13/2019 October 5, 2016--vitrectomy, membrane peel, panretinal endophotocoagulation laser, right eye for preretinal membrane, vitreous hemorrhage, and posterior vitreous detachment of right eye.        • History of PTCA, 7/15/2013--drug-eluting stent proximal and origin of LM. 8/13/2019    July 15, 2013--successful drug-eluting stent PCI to the proximal and origin of the left main using vascular ultrasound guidance.   • History of stroke, questionable. 8/13/2019          • Hx of CABG, 2004--LIMA to LAD; RADHA to RCA; SVG to obtuse marginal; SVG to diagonal. 8/13/2019 December 2, 2015--cardiac catheterization: LIMA to LAD (patent); RADHA to RCA (occluded 100%); SVG to obtuse marginal (occluded 100%); SVG to diagonal (occluded 100%).  2004--four-vessel CABG.  LIMA to LAD; RADHA to RCA; SVG to obtuse marginal; SVG to diagonal.   • Hyperlipidemia 8/13/2019   • Impaired fasting glucose 5/21/2020   • Macrocytosis 8/13/2019 July 25, 2019--MCV elevated at 102.2.  Vitamin B12 level was above normal at 1208.  Methylmalonic acid was not performed.   • Multiple pulmonary nodules,  3/17/2021--stable 3.1 millimeter left upper lobe nodule.  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  Yearly screening recommen 8/13/2019 March 17, 2021--CT scan of the chest reveals a solitary stable left upper lobe 3.5 mm nodule.  Persistent reticulonodular interstitial disease in the right middle lobe likely to be RB-ILD in this patient with smoking history and not neoplastic.  Patient is to return to annual lung screening.  Thus, Fleischner's criteria not applied.  January 29, 2019--CT scan of the chest without contrast performe   • Occlusive coronary artery disease, 12/2/2015--cardiac cath: LIMA to LAD (patent); RADHA to RCA (occluded 100%); SVG to obtuse marginal (occluded 100%); SVG to diagonal (occluded 100%).   8/13/2019 December 2, 2015--cardiac catheterization: LIMA to LAD (patent); RADHA to RCA (occluded 100%); SVG to obtuse marginal (occluded 100%); SVG to diagonal (occluded 100%).  July 15, 2013--successful drug-eluting stent PCI to the proximal and origin of the left main using vascular ultrasound guidance.  2004--four-vessel CABG.  LIMA to LAD; RADHA to RCA; SVG to obtuse marginal; SVG to diagonal.   HPI: Fra   • Overweight (BMI 25.0-29.9) 5/2/2022   • Proctalgia fugax 8/13/2019   • Pulmonary emphysema (HCC) 8/13/2019 January 29, 2019--CT scan of the chest without contrast performed for abnormal CT scan reveals minimal change in clustered nodularity and tree-in-bud nodularity involving dominantly the upper lobes and right middle lobe, likely postinfectious or postinflammatory.  A few new areas of peripheral mucus plugging noted at the right lower lobe.  No new or enlarging pulmonary nodules.  Return to annual s   • Vitamin D deficiency 8/13/2019         Past Surgical History:   Procedure Laterality Date   • ABDOMINAL AORTIC ANEURYSM REPAIR W/ ENDOLUMINAL GRAFT  06/23/2017 June 23, 2017--endovascular repair of AAA.   • CARDIAC CATHETERIZATION   12/02/2015 December 2, 2015--cardiac catheterization.  See past medical history for details.   • CATARACT EXTRACTION, BILATERAL     • COLONOSCOPY  2010 2010--colonoscopy revealed a serrated adenoma.   • COLONOSCOPY W/ POLYPECTOMY  02/2016 February 2016--colonoscopy with polypectomy.  Pathology not known.   • CORONARY ANGIOPLASTY WITH STENT PLACEMENT  07/15/2013    July 15, 2013--successful drug-eluting stent PCI to the proximal and origin of the left main using vascular ultrasound guidance.   • CORONARY ARTERY BYPASS GRAFT  2004 2004--four-vessel CABG.  LIMA to LAD; RADHA to RCA; SVG to obtuse marginal; SVG to diagonal.   • PARS PLANA VITRECTOMY W/ ENDOPHOTOCOAGULATION  10/05/2016    October 5, 2016--vitrectomy, membrane peel, panretinal endophotocoagulation laser, right eye for preretinal membrane, vitreous hemorrhage, and posterior vitreous detachment of right eye.   • UPPER GASTROINTESTINAL ENDOSCOPY  02/2016 February 2016--EGD reportedly revealed esophagitis and gastritis.         Allergies   Allergen Reactions   • Codeine Hives   • Msg [Monosodium Glutamate] Other (See Comments)     Passes out   • Penicillins Hives           Current Outpatient Medications:   •  ascorbic acid (VITAMIN C) 1000 MG tablet, , Disp: , Rfl:   •  aspirin 81 MG tablet, Take 81 mg by mouth Daily., Disp: , Rfl:   •  atorvastatin (LIPITOR) 80 MG tablet, TAKE 1 TABLET EVERY DAY FOR HIGH CHOLESTEROL, Disp: 90 tablet, Rfl: 2  •  Cholecalciferol (VITAMIN D3) 5000 units capsule capsule, Take  by mouth., Disp: , Rfl:   •  clindamycin (CLINDAGEL) 1 % gel, APPLY TO AFFECTED AREA(S) TWO TIMES A DAY AS DIRECTED, Disp: 60 g, Rfl: 5  •  clopidogrel (PLAVIX) 75 MG tablet, Take 1 p.o. daily for blood thinner, Disp: 30 tablet, Rfl:   •  Cyanocobalamin (B-12) 1000 MCG tablet, Take 1 tablet by mouth Daily., Disp: , Rfl:   •  desonide (DESOWEN) 0.05 % cream, Apply  topically to the appropriate area as directed., Disp: , Rfl:   •  diazePAM  (VALIUM) 5 MG tablet, Take 1 tablet by mouth Daily As Needed for Anxiety., Disp: 30 tablet, Rfl: 3  •  erythromycin (ROMYCIN) 5 MG/GM ophthalmic ointment, PUT ON QTIP AND APPLY TO EYELID AT BEDTIME, Disp: , Rfl:   •  esomeprazole (nexIUM) 40 MG capsule, TAKE 1 CAPSULE  BY MOUTH DAILY BEFORE ALISHA MEAL FOR REFLUX, Disp: 90 capsule, Rfl: 1  •  ezetimibe (ZETIA) 10 MG tablet, TAKE 1 TABLET EVERY DAY FOR HIGH CHOLESTEROL, Disp: 90 tablet, Rfl: 2  •  fluorometholone (FML) 0.1 % ophthalmic suspension, INSTILL 1 DROP INTO EACH EYE TWICE DAILY, Disp: , Rfl: 5  •  fluticasone (FLONASE) 50 MCG/ACT nasal spray, USE 2 SPRAYS IN EACH NOSTRIL ONE TIME DAILY AS NEEDED, Disp: 16 g, Rfl: 6  •  hydrocortisone 2.5 % cream, APPLY TO AFFECTED AREA(S) TWO TIMES A DAY AS DIRECTED, Disp: 30 g, Rfl: 2  •  metoprolol succinate XL (TOPROL-XL) 25 MG 24 hr tablet, TAKE 1 TABLET BY MOUTH DAILY ALONG WITH THE 50MG TABLET AS NEEDED FOR PALPITATIONS., Disp: , Rfl:   •  metoprolol succinate XL (TOPROL-XL) 50 MG 24 hr tablet, , Disp: , Rfl:   •  Periogard 0.12 % solution, USE 15 MILLILITERS TO THE MOUTH OR THROAT 2 TIMES A DAY, Disp: 2838 mL, Rfl: 2  •  podofilox (CONDYLOX) 0.5 % external solution, Apply  topically to the appropriate area as directed., Disp: , Rfl:   •  Sodium Fluoride (PreviDent 5000 Booster Plus) 1.1 % paste, Use as directed orally twice daily, Disp: 200 mL, Rfl: 11  •  tamsulosin (FLOMAX) 0.4 MG capsule 24 hr capsule, Take 1 p.o. twice daily for prostate, Disp: 60 capsule, Rfl: 6  •  Vitamin A 2400 MCG (8000 UT) capsule, Take  by mouth Daily., Disp: , Rfl:   •  Zinc 100 MG tablet, , Disp: , Rfl:       Family History   Problem Relation Age of Onset   • Diabetes type II Mother    • Coronary artery disease Father    • Diabetes type II Father    • Kidney disease Father    • Hypertension Father    • Crohn's disease Sister    • Coronary artery disease Brother         Brother  of a myocardial infarction at age 53         Social  "History     Socioeconomic History   • Marital status:    Tobacco Use   • Smoking status: Former Smoker     Packs/day: 0.50     Types: Cigarettes     Start date:      Quit date: 2020     Years since quittin.3   • Smokeless tobacco: Never Used   Vaping Use   • Vaping Use: Never used   Substance and Sexual Activity   • Alcohol use: No   • Drug use: No   • Sexual activity: Not Currently     Partners: Female         Vitals:    22 0958   BP: 138/64   Pulse: 77   Resp: 18   SpO2: 99%   Weight: 72.7 kg (160 lb 3.2 oz)   Height: 165.1 cm (65\")        Body mass index is 26.66 kg/m².      Physical Exam:    General: Alert and oriented x 3.  No acute distress.  Overweight.  Normal affect.  HEENT: Pupils equal, round, reactive to light; extraocular movements intact; sclerae nonicteric; pharynx, ear canals and TMs normal.  Neck: Without JVD, thyromegaly, bruit, or adenopathy.  Lungs: Clear to auscultation in all fields.  Heart: Regular rate and rhythm without murmur, rub, gallop, or click.  Abdomen: Soft, nontender, without hepatosplenomegaly or hernia.  Bowel sounds normal.  : Deferred.  Rectal: Deferred.  Extremities: Without clubbing, cyanosis, edema, or pulse deficit.  Neurologic: Intact without focal deficit.  Normal station and gait observed during ingress and egress from the examination room.  Skin: Without significant lesion.  Musculoskeletal: Unremarkable.    Lab/other results:    NMR is nearly perfect.  Total cholesterol 131.  Triglycerides 69.  LDL particle #944.  Small LDL particle #515.  HDL particle #31.4.  CBC normal except hemoglobin low at 12.5 and hematocrit low at 37.4.  MCV elevated at 99.  CPK is normal.  Hemoglobin A1c 5.7.  Thyroid function tests normal.  PSA normal at 0.3.  Vitamin D normal at 50.1.  Uric acid normal at 5.1.    Assessment/Plan:     Diagnosis Plan   1. Encounter for subsequent annual wellness visit (AWV) in Medicare patient     2. Impaired fasting glucose  " Comprehensive Metabolic Panel    Hemoglobin A1c    Urinalysis With Microscopic If Indicated (No Culture) - Urine, Clean Catch   3. Hyperlipidemia  CK    Comprehensive Metabolic Panel    Homocysteine    NMR LipoProfile    TSH    T4, Free    T3, Free   4. History of gout  Uric Acid   5. Occlusive coronary artery disease, 12/2/2015--cardiac cath: LIMA to LAD (patent); RADHA to RCA (occluded 100%); SVG to obtuse marginal (occluded 100%); SVG to diagonal (occluded 100%).       6. History of PTCA, 7/15/2013--drug-eluting stent proximal and origin of LM.     7. Hx of CABG, 2004--LIMA to LAD; RADHA to RCA; SVG to obtuse marginal; SVG to diagonal.     8. History of abdominal aortic aneurysm (AAA), 6/23/2017--endovascular AAA repair with stent.     9. Bilateral carotid artery stenosis     10. History of stroke     11. Vitamin D deficiency  Vitamin D 25 Hydroxy   12. Benign prostatic hyperplasia with weak urinary stream  PSA DIAGNOSTIC   13. Panlobular emphysema (HCC)     14. Multiple pulmonary nodules, 3/17/2021--stable 3.1 millimeter left upper lobe nodule.  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  Yearly screening recommen  CT Chest Without Contrast   15. History of colon polyps  Ambulatory Referral For Screening Colonoscopy   16. Gastroesophageal reflux disease with esophagitis without hemorrhage     17. Proctalgia fugax     18. Macrocytosis  CBC (No Diff)    Homocysteine    Methylmalonic Acid, Serum   19. Heart palpitations     20. Overweight (BMI 25.0-29.9)     21. Former cigarette smoker     22. Colon cancer screening  Ambulatory Referral For Screening Colonoscopy     Patient has mild impaired fasting glucose.  He is little overweight and I have strongly recommended low carbohydrate diet, exercise, and weight loss.  Hyperlipidemia is under excellent control which is important given his occlusive coronary artery disease as well as carotid artery disease and history of stroke.   He also has a history of a AAA repair.  He is followed by the vascular service.  His vitamin D is in the normal range.  BPH symptoms seem to be reasonably controlled with Flomax.  He is followed by the urologist.  Patient has emphysema and multiple pulmonary nodules and is followed by the pulmonary service.  He has a history of colon polyps and apparently is overdue for his colonoscopy.  Esophageal reflux seems to be controlled.  Proctalgia fugax is an ongoing problem.  Macrocytosis has been evaluated and there is no evidence of vitamin B12 or folic acid deficiency.    Plan is as follows: Low carbohydrate diet, exercise, and weight loss.  I am not going to make any changes to his current medical regimen.  Strongly recommend he keep follow-up with the specialists including vascular service, cardiologist, and neurologist as well as pulmonary service.  Patient will follow-up with fasting lab prior in 6 months to reassess.      Procedures

## 2022-05-02 NOTE — PROGRESS NOTES
The ABCs of the Annual Wellness Visit  Subsequent Medicare Wellness Visit    No chief complaint on file.     Subjective    History of Present Illness:  Vernon Willis is a 71 y.o. male who presents for a Subsequent Medicare Wellness Visit.    The following portions of the patient's history were reviewed and   updated as appropriate: allergies, current medications, past family history, past medical history, past social history, past surgical history and problem list.    Compared to one year ago, the patient feels his physical   health is better.    Compared to one year ago, the patient feels his mental   health is better.    Recent Hospitalizations:  He was not admitted to the hospital during the last year.       Current Medical Providers:  Patient Care Team:  Ryan Hutchinson MD as PCP - General (Internal Medicine)    Outpatient Medications Prior to Visit   Medication Sig Dispense Refill   • ascorbic acid (VITAMIN C) 1000 MG tablet      • aspirin 81 MG tablet Take 81 mg by mouth Daily.     • atorvastatin (LIPITOR) 80 MG tablet TAKE 1 TABLET EVERY DAY FOR HIGH CHOLESTEROL 90 tablet 2   • Cholecalciferol (VITAMIN D3) 5000 units capsule capsule Take  by mouth.     • clindamycin (CLINDAGEL) 1 % gel APPLY TO AFFECTED AREA(S) TWO TIMES A DAY AS DIRECTED 60 g 5   • clopidogrel (PLAVIX) 75 MG tablet Take 1 p.o. daily for blood thinner 30 tablet    • Cyanocobalamin (B-12) 1000 MCG tablet Take 1 tablet by mouth Daily.     • desonide (DESOWEN) 0.05 % cream Apply  topically to the appropriate area as directed.     • diazePAM (VALIUM) 5 MG tablet Take 1 tablet by mouth Daily As Needed for Anxiety. 30 tablet 3   • erythromycin (ROMYCIN) 5 MG/GM ophthalmic ointment PUT ON QTIP AND APPLY TO EYELID AT BEDTIME     • esomeprazole (nexIUM) 40 MG capsule TAKE 1 CAPSULE  BY MOUTH DAILY BEFORE ALISHA MEAL FOR REFLUX 90 capsule 1   • ezetimibe (ZETIA) 10 MG tablet TAKE 1 TABLET EVERY DAY FOR HIGH CHOLESTEROL 90 tablet 2   •  fluorometholone (FML) 0.1 % ophthalmic suspension INSTILL 1 DROP INTO EACH EYE TWICE DAILY  5   • fluticasone (FLONASE) 50 MCG/ACT nasal spray USE 2 SPRAYS IN EACH NOSTRIL ONE TIME DAILY AS NEEDED 16 g 6   • hydrocortisone 2.5 % cream APPLY TO AFFECTED AREA(S) TWO TIMES A DAY AS DIRECTED 30 g 2   • metoprolol succinate XL (TOPROL-XL) 25 MG 24 hr tablet TAKE 1 TABLET BY MOUTH DAILY ALONG WITH THE 50MG TABLET AS NEEDED FOR PALPITATIONS.     • metoprolol succinate XL (TOPROL-XL) 50 MG 24 hr tablet      • Periogard 0.12 % solution USE 15 MILLILITERS TO THE MOUTH OR THROAT 2 TIMES A DAY 2838 mL 2   • podofilox (CONDYLOX) 0.5 % external solution Apply  topically to the appropriate area as directed.     • Sodium Fluoride (PreviDent 5000 Booster Plus) 1.1 % paste Use as directed orally twice daily 200 mL 11   • tamsulosin (FLOMAX) 0.4 MG capsule 24 hr capsule Take 1 p.o. twice daily for prostate 60 capsule 6   • Vitamin A 2400 MCG (8000 UT) capsule Take  by mouth Daily.     • Zinc 100 MG tablet      • Ergocalciferol (VITAMIN D2) 2000 units tablet Take 1 tablet by mouth Daily.       No facility-administered medications prior to visit.       No opioid medication identified on active medication list. I have reviewed chart for other potential  high risk medication/s and harmful drug interactions in the elderly.          Aspirin is on active medication list. Aspirin use is indicated based on review of current medical condition/s. Pros and cons of this therapy have been discussed today. Benefits of this medication outweigh potential harm.  Patient has been encouraged to continue taking this medication.  .      Patient Active Problem List   Diagnosis   • History of abdominal aortic aneurysm (AAA), 6/23/2017--endovascular AAA repair with stent.   • Occlusive coronary artery disease, 12/2/2015--cardiac cath: LIMA to LAD (patent); RADHA to RCA (occluded 100%); SVG to obtuse marginal (occluded 100%); SVG to diagonal (occluded 100%).    "  • Hyperlipidemia   • History of gout   • Chronic allergic conjunctivitis   • Vitamin D deficiency   • Benign prostatic hyperplasia with weak urinary stream   • Therapeutic drug monitoring   • History of stroke   • History of PTCA, 7/15/2013--drug-eluting stent proximal and origin of LM.   • Hx of CABG, 2004--LIMA to LAD; RADHA to RCA; SVG to obtuse marginal; SVG to diagonal.   • Pulmonary emphysema (HCC)   • Multiple pulmonary nodules, 3/17/2021--stable 3.1 millimeter left upper lobe nodule.  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  Yearly screening recommen   • History of colon polyps   • Genital warts   • Allergic rhinitis   • Gastroesophageal reflux disease with esophagitis   • Proctalgia fugax   • Macrocytosis   • Impaired fasting glucose   • Heart palpitations   • Former cigarette smoker   • Bilateral carotid artery stenosis, 7/1/2021--60-70% right; 50-60% left.   • Overweight (BMI 25.0-29.9)     Advance Care Planning  Advance Directive is on file.  ACP discussion was held with the patient during this visit. Patient has an advance directive in EMR which is still valid.     Review of Systems   Constitutional: Negative.    HENT: Negative.    Eyes: Negative.    Respiratory: Negative.    Gastrointestinal: Negative.    Endocrine: Negative.    Genitourinary: Negative.    Musculoskeletal: Negative.    Skin: Negative.    Allergic/Immunologic: Negative.    Hematological: Negative.    Psychiatric/Behavioral: Negative.         Objective    Vitals:    05/02/22 0958   BP: 138/64   Pulse: 77   Resp: 18   SpO2: 99%   Weight: 72.7 kg (160 lb 3.2 oz)   Height: 165.1 cm (65\")   PainSc: 0-No pain     BMI Readings from Last 1 Encounters:   05/02/22 26.66 kg/m²   BMI is above normal parameters. Recommendations include: exercise counseling and nutrition counseling    Does the patient have evidence of cognitive impairment? No    Physical Exam     General: Alert and oriented x 3.  No " acute distress.  Overweight.  Normal affect.  HEENT: Pupils equal, round, reactive to light; extraocular movements intact; sclerae nonicteric; pharynx, ear canals and TMs normal.  Neck: Without JVD, thyromegaly, bruit, or adenopathy.  Lungs: Clear to auscultation in all fields.  Heart: Regular rate and rhythm without murmur, rub, gallop, or click.  Abdomen: Soft, nontender, without hepatosplenomegaly or hernia.  Bowel sounds normal.  : Deferred.  Rectal: Deferred.  Extremities: Without clubbing, cyanosis, edema, or pulse deficit.  Neurologic: Intact without focal deficit.  Normal station and gait observed during ingress and egress from the examination room.  Skin: Without significant lesion.  Musculoskeletal: Unremarkable.    Lab Results   Component Value Date    CHLPL 131 2022    TRIG 69 2022    HGBA1C 5.7 (H) 2022            HEALTH RISK ASSESSMENT    Smoking Status:  Social History     Tobacco Use   Smoking Status Former Smoker   • Packs/day: 0.50   • Types: Cigarettes   • Start date:    • Quit date: 2020   • Years since quittin.3   Smokeless Tobacco Never Used     Alcohol Consumption:  Social History     Substance and Sexual Activity   Alcohol Use No     Fall Risk Screen:    [facility] not [facility].    Depression Screening:  PHQ-2/PHQ-9 Depression Screening 2021   Retired PHQ-9 Total Score 0   Retired Total Score 0       Health Habits and Functional and Cognitive Screening:  Functional & Cognitive Status 2022   Do you have difficulty preparing food and eating? No   Do you have difficulty bathing yourself, getting dressed or grooming yourself? No   Do you have difficulty using the toilet? No   Do you have difficulty moving around from place to place? No   Do you have trouble with steps or getting out of a bed or a chair? No   Current Diet Well Balanced Diet   Dental Exam Not up to date   Eye Exam Up to date   Exercise (times per week) 3 times per  week   Current Exercises Include Walking   Current Exercise Activities Include -   Do you need help using the phone?  No   Are you deaf or do you have serious difficulty hearing?  No   Do you need help with transportation? No   Do you need help shopping? No   Do you need help preparing meals?  No   Do you need help with housework?  No   Do you need help with laundry? No   Do you need help taking your medications? No   Do you need help managing money? No   Do you ever drive or ride in a car without wearing a seat belt? No   Have you felt unusual stress, anger or loneliness in the last month? No   Who do you live with? Alone   If you need help, do you have trouble finding someone available to you? No   Have you been bothered in the last four weeks by sexual problems? No   Do you have difficulty concentrating, remembering or making decisions? -       Age-appropriate Screening Schedule:  Refer to the list below for future screening recommendations based on patient's age, sex and/or medical conditions. Orders for these recommended tests are listed in the plan section. The patient has been provided with a written plan.    Health Maintenance   Topic Date Due   • INFLUENZA VACCINE  08/01/2022   • LIPID PANEL  04/27/2023   • TDAP/TD VACCINES  Discontinued   • ZOSTER VACCINE  Discontinued              Assessment/Plan   CMS Preventative Services Quick Reference  Risk Factors Identified During Encounter  Cardiovascular Disease  Obesity/Overweight   The above risks/problems have been discussed with the patient.  Follow up actions/plans if indicated are seen below in the Assessment/Plan Section.  Pertinent information has been shared with the patient in the After Visit Summary.    Diagnoses and all orders for this visit:    1. Encounter for subsequent annual wellness visit (AWV) in Medicare patient (Primary)    2. Impaired fasting glucose  -     Comprehensive Metabolic Panel; Future  -     Hemoglobin A1c; Future  -     Urinalysis  With Microscopic If Indicated (No Culture) - Urine, Clean Catch; Future    3. Hyperlipidemia  -     CK; Future  -     Comprehensive Metabolic Panel; Future  -     Homocysteine; Future  -     NMR LipoProfile; Future  -     TSH; Future  -     T4, Free; Future  -     T3, Free; Future    4. History of gout  -     Uric Acid; Future    5. Occlusive coronary artery disease, 12/2/2015--cardiac cath: LIMA to LAD (patent); RADHA to RCA (occluded 100%); SVG to obtuse marginal (occluded 100%); SVG to diagonal (occluded 100%).      6. History of PTCA, 7/15/2013--drug-eluting stent proximal and origin of LM.    7. Hx of CABG, 2004--LIMA to LAD; RADHA to RCA; SVG to obtuse marginal; SVG to diagonal.    8. History of abdominal aortic aneurysm (AAA), 6/23/2017--endovascular AAA repair with stent.    9. Bilateral carotid artery stenosis    10. History of stroke    11. Vitamin D deficiency  -     Vitamin D 25 Hydroxy; Future    12. Benign prostatic hyperplasia with weak urinary stream  -     PSA DIAGNOSTIC; Future    13. Panlobular emphysema (HCC)    14. Multiple pulmonary nodules, 3/17/2021--stable 3.1 millimeter left upper lobe nodule.  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  Yearly screening recommen    15. History of colon polyps    16. Gastroesophageal reflux disease with esophagitis without hemorrhage    17. Proctalgia fugax    18. Macrocytosis  -     CBC (No Diff); Future  -     Homocysteine; Future  -     Methylmalonic Acid, Serum; Future    19. Heart palpitations    20. Overweight (BMI 25.0-29.9)    21. Former cigarette smoker        Follow Up:   Return in about 6 months (around 11/2/2022) for Next scheduled follow up with lab prior.     An After Visit Summary and PPPS were made available to the patient.

## 2022-05-05 DIAGNOSIS — R91.8 MULTIPLE PULMONARY NODULES: Chronic | ICD-10-CM

## 2022-05-26 RX ORDER — CIPROFLOXACIN 500 MG/1
TABLET, FILM COATED ORAL
Qty: 20 TABLET | Refills: 0 | Status: SHIPPED | OUTPATIENT
Start: 2022-05-26 | End: 2022-11-02

## 2022-06-12 DIAGNOSIS — J30.1 SEASONAL ALLERGIC RHINITIS DUE TO POLLEN: Chronic | ICD-10-CM

## 2022-06-13 RX ORDER — FLUTICASONE PROPIONATE 50 MCG
SPRAY, SUSPENSION (ML) NASAL
Qty: 16 G | Refills: 6 | Status: SHIPPED | OUTPATIENT
Start: 2022-06-13 | End: 2022-12-13

## 2022-06-15 ENCOUNTER — TELEPHONE (OUTPATIENT)
Dept: INTERNAL MEDICINE | Facility: CLINIC | Age: 72
End: 2022-06-15

## 2022-06-15 NOTE — TELEPHONE ENCOUNTER
Caller: Vernon Willis    Relationship: Self    Best call back number: [phone]    What medication are you requesting: diazePAM (VALIUM) 2 MG tablet    If a prescription is needed, what is your preferred pharmacy and phone number: [facility] [address] - [address] [facility] - [phone]  - [phone] FX     Additional notes: PT STATED THAT HE WANTS TO GO DOWN TO 2 MG INSTEAD OF 5 MG. PHARMACY STATED THAT PCP WOULD HAVE TO CONTACT THEM TO CANCEL OUT THE 3 REFILLS THAT PT HAS ON FILE AND SEND IN A NEW PRESCRIPTION FOR THE 2 MG. PT IS REQUESTING 40 PILLS PER PRESCRIPTION SO THAT HE CAN TAKE 2.5 MG PER DAY SINCE THEY DO NOT MAKE A 2.5.

## 2022-06-16 NOTE — TELEPHONE ENCOUNTER
Tell the patient that the easiest thing to do is to leave the prescription alone and cut 5 mg in half and take 2.5 mg as he sees fit.  This is the best way to go about this.  The difference between 2 mg and 2.5 mg is insignificant.

## 2022-06-17 NOTE — TELEPHONE ENCOUNTER
Patient notified and expressed understanding. Patient stated that he would really like to have a 2 mg or a 4 mg at least. He has been adjusting this medication on his own and has found that 2 mg is working well for his issue at this time.  Please advise.

## 2022-06-20 DIAGNOSIS — R00.2 HEART PALPITATIONS: ICD-10-CM

## 2022-06-20 DIAGNOSIS — K59.4 PROCTALGIA FUGAX: Primary | ICD-10-CM

## 2022-06-20 RX ORDER — DIAZEPAM 2 MG/1
2 TABLET ORAL 2 TIMES DAILY PRN
Qty: 60 TABLET | Refills: 1 | Status: SHIPPED | OUTPATIENT
Start: 2022-06-20 | End: 2022-11-09

## 2022-06-20 NOTE — TELEPHONE ENCOUNTER
[place] sent a prescription for 2 mg.  They do not like for mammogram.  Tell patient the stomach needs to be closed/resolved as of now.

## 2022-07-13 RX ORDER — SODIUM FLUORIDE 6 MG/ML
PASTE, DENTIFRICE DENTAL
Qty: 200 ML | Refills: 11 | Status: SHIPPED | OUTPATIENT
Start: 2022-07-13

## 2022-07-15 DIAGNOSIS — K21.00 GASTROESOPHAGEAL REFLUX DISEASE WITH ESOPHAGITIS: ICD-10-CM

## 2022-07-15 RX ORDER — ESOMEPRAZOLE MAGNESIUM 40 MG/1
CAPSULE, DELAYED RELEASE ORAL
Qty: 90 CAPSULE | Refills: 2 | Status: SHIPPED | OUTPATIENT
Start: 2022-07-15

## 2022-07-15 RX ORDER — CHLORHEXIDINE GLUCONATE 0.12 MG/ML
15 RINSE ORAL 2 TIMES DAILY
Qty: 2838 ML | Refills: 2 | Status: SHIPPED | OUTPATIENT
Start: 2022-07-15 | End: 2023-02-13

## 2022-07-29 DIAGNOSIS — E78.2 MIXED HYPERLIPIDEMIA: Chronic | ICD-10-CM

## 2022-07-29 RX ORDER — EZETIMIBE 10 MG/1
TABLET ORAL
Qty: 90 TABLET | Refills: 2 | Status: SHIPPED | OUTPATIENT
Start: 2022-07-29 | End: 2023-02-13

## 2022-07-29 NOTE — TELEPHONE ENCOUNTER
Caller: [facility] (NOW [facility]) - [place], [place] - [address] - [phone] [facility] [phone] FX    Relationship: Pharmacy    Best call back number: [phone]    Requested Prescriptions:   Requested Prescriptions     Pending Prescriptions Disp Refills   • ezetimibe (ZETIA) 10 MG tablet 90 tablet 2     Sig: TAKE 1 TABLET EVERY DAY FOR HIGH CHOLESTEROL        Pharmacy where request should be sent: [facility] (Now [facility]) - [place], [place] - [address] - [phone] [facility] [phone] FX    Does the patient have less than a 3 day supply:  [] Yes  [x] No    Arcadio Meek Rep   07/29/22 09:15 EDT

## 2022-09-06 ENCOUNTER — PREP FOR SURGERY (OUTPATIENT)
Dept: OTHER | Facility: HOSPITAL | Age: 72
End: 2022-09-06

## 2022-09-06 DIAGNOSIS — Z86.010 HISTORY OF ADENOMATOUS POLYP OF COLON: Primary | ICD-10-CM

## 2022-09-08 ENCOUNTER — TELEPHONE (OUTPATIENT)
Dept: SURGERY | Facility: CLINIC | Age: 72
End: 2022-09-08

## 2022-09-08 NOTE — TELEPHONE ENCOUNTER
Pt scheduled for 11/1 but he's on blood thinner & ASA he states cardiologist wont let him stop it? Says he clots normal

## 2022-09-22 ENCOUNTER — TELEPHONE (OUTPATIENT)
Dept: SURGERY | Facility: CLINIC | Age: 72
End: 2022-09-22

## 2022-09-22 NOTE — TELEPHONE ENCOUNTER
Pt was scheduled for 11/1 needed to re-schedule for 1/2023.  He asked if a uber could bring him & I said yes but, a family member or friend would have to pick you up that it was a liability for the hospital to release you to someone who doesn't know you.  He said fine I will find someone else to do it.  I then stated I doubted anyone would let you leave in a uber.& if he by chance found someone other than uber to call us back to re-schedule.

## 2022-10-27 RX ORDER — ATORVASTATIN CALCIUM 80 MG/1
TABLET, FILM COATED ORAL
Qty: 90 TABLET | Refills: 2 | Status: SHIPPED | OUTPATIENT
Start: 2022-10-27

## 2022-11-02 ENCOUNTER — OFFICE VISIT (OUTPATIENT)
Dept: INTERNAL MEDICINE | Facility: CLINIC | Age: 72
End: 2022-11-02

## 2022-11-02 VITALS
OXYGEN SATURATION: 99 % | HEIGHT: 65 IN | DIASTOLIC BLOOD PRESSURE: 60 MMHG | HEART RATE: 75 BPM | SYSTOLIC BLOOD PRESSURE: 140 MMHG | RESPIRATION RATE: 18 BRPM | BODY MASS INDEX: 24.99 KG/M2 | WEIGHT: 150 LBS

## 2022-11-02 DIAGNOSIS — R91.8 MULTIPLE PULMONARY NODULES: Chronic | ICD-10-CM

## 2022-11-02 DIAGNOSIS — Z87.39 HISTORY OF GOUT: Chronic | ICD-10-CM

## 2022-11-02 DIAGNOSIS — I25.10 OCCLUSIVE CORONARY ARTERY DISEASE: Chronic | ICD-10-CM

## 2022-11-02 DIAGNOSIS — Z98.61 HISTORY OF PTCA: Chronic | ICD-10-CM

## 2022-11-02 DIAGNOSIS — Z95.1 HX OF CABG: Chronic | ICD-10-CM

## 2022-11-02 DIAGNOSIS — R73.01 IMPAIRED FASTING GLUCOSE: Chronic | ICD-10-CM

## 2022-11-02 DIAGNOSIS — I65.23 BILATERAL CAROTID ARTERY STENOSIS: Chronic | ICD-10-CM

## 2022-11-02 DIAGNOSIS — Z51.81 THERAPEUTIC DRUG MONITORING: ICD-10-CM

## 2022-11-02 DIAGNOSIS — Z86.73 HISTORY OF STROKE: Chronic | ICD-10-CM

## 2022-11-02 DIAGNOSIS — R39.12 BENIGN PROSTATIC HYPERPLASIA WITH WEAK URINARY STREAM: Chronic | ICD-10-CM

## 2022-11-02 DIAGNOSIS — K21.00 GASTROESOPHAGEAL REFLUX DISEASE WITH ESOPHAGITIS WITHOUT HEMORRHAGE: Chronic | ICD-10-CM

## 2022-11-02 DIAGNOSIS — E55.9 VITAMIN D DEFICIENCY: Chronic | ICD-10-CM

## 2022-11-02 DIAGNOSIS — J43.1 PANLOBULAR EMPHYSEMA: Chronic | ICD-10-CM

## 2022-11-02 DIAGNOSIS — D75.89 MACROCYTOSIS: Chronic | ICD-10-CM

## 2022-11-02 DIAGNOSIS — E78.2 MIXED HYPERLIPIDEMIA: Primary | Chronic | ICD-10-CM

## 2022-11-02 DIAGNOSIS — Z86.010 HISTORY OF COLON POLYPS: Chronic | ICD-10-CM

## 2022-11-02 DIAGNOSIS — N40.1 BENIGN PROSTATIC HYPERPLASIA WITH WEAK URINARY STREAM: Chronic | ICD-10-CM

## 2022-11-02 DIAGNOSIS — R00.2 HEART PALPITATIONS: Chronic | ICD-10-CM

## 2022-11-02 PROBLEM — E66.3 OVERWEIGHT (BMI 25.0-29.9): Chronic | Status: RESOLVED | Noted: 2022-05-02 | Resolved: 2022-11-02

## 2022-11-02 LAB
25(OH)D3+25(OH)D2 SERPL-MCNC: 65.3 NG/ML (ref 30–100)
ALBUMIN SERPL-MCNC: 4.7 G/DL (ref 3.7–4.7)
ALBUMIN/GLOB SERPL: 2 {RATIO} (ref 1.2–2.2)
ALP SERPL-CCNC: 97 IU/L (ref 44–121)
ALT SERPL-CCNC: 19 IU/L (ref 0–44)
APPEARANCE UR: CLEAR
AST SERPL-CCNC: 29 IU/L (ref 0–40)
BACTERIA #/AREA URNS HPF: NORMAL /[HPF]
BILIRUB SERPL-MCNC: 0.6 MG/DL (ref 0–1.2)
BILIRUB UR QL STRIP: NEGATIVE
BUN SERPL-MCNC: 9 MG/DL (ref 8–27)
BUN/CREAT SERPL: 10 (ref 10–24)
CALCIUM SERPL-MCNC: 10.2 MG/DL (ref 8.6–10.2)
CASTS URNS QL MICRO: NORMAL /LPF
CHLORIDE SERPL-SCNC: 100 MMOL/L (ref 96–106)
CHOLEST SERPL-MCNC: 130 MG/DL (ref 100–199)
CK SERPL-CCNC: 161 U/L (ref 41–331)
CO2 SERPL-SCNC: 27 MMOL/L (ref 20–29)
COLOR UR: YELLOW
CREAT SERPL-MCNC: 0.91 MG/DL (ref 0.76–1.27)
EGFRCR SERPLBLD CKD-EPI 2021: 90 ML/MIN/1.73
EPI CELLS #/AREA URNS HPF: NORMAL /HPF (ref 0–10)
ERYTHROCYTE [DISTWIDTH] IN BLOOD BY AUTOMATED COUNT: 11.8 % (ref 11.6–15.4)
GLOBULIN SER CALC-MCNC: 2.4 G/DL (ref 1.5–4.5)
GLUCOSE SERPL-MCNC: 93 MG/DL (ref 70–99)
GLUCOSE UR QL STRIP: NEGATIVE
HBA1C MFR BLD: 5.5 % (ref 4.8–5.6)
HCT VFR BLD AUTO: 42.9 % (ref 37.5–51)
HCYS SERPL-SCNC: 9 UMOL/L (ref 0–19.2)
HDL SERPL-SCNC: 34.5 UMOL/L
HDLC SERPL-MCNC: 41 MG/DL
HGB BLD-MCNC: 14.5 G/DL (ref 13–17.7)
HGB UR QL STRIP: ABNORMAL
KETONES UR QL STRIP: NEGATIVE
LDL SERPL QN: 20.2 NM
LDL SERPL-SCNC: 975 NMOL/L
LDL SMALL SERPL-SCNC: 516 NMOL/L
LDLC SERPL CALC-MCNC: 72 MG/DL (ref 0–99)
LEUKOCYTE ESTERASE UR QL STRIP: NEGATIVE
MCH RBC QN AUTO: 32.7 PG (ref 26.6–33)
MCHC RBC AUTO-ENTMCNC: 33.8 G/DL (ref 31.5–35.7)
MCV RBC AUTO: 97 FL (ref 79–97)
METHYLMALONATE SERPL-SCNC: 173 NMOL/L (ref 0–378)
MICRO URNS: ABNORMAL
NITRITE UR QL STRIP: NEGATIVE
PH UR STRIP: 6.5 [PH] (ref 5–7.5)
PLATELET # BLD AUTO: 211 X10E3/UL (ref 150–450)
POTASSIUM SERPL-SCNC: 4.7 MMOL/L (ref 3.5–5.2)
PROT SERPL-MCNC: 7.1 G/DL (ref 6–8.5)
PROT UR QL STRIP: NEGATIVE
PSA SERPL-MCNC: 0.3 NG/ML (ref 0–4)
RBC # BLD AUTO: 4.43 X10E6/UL (ref 4.14–5.8)
RBC #/AREA URNS HPF: NORMAL /HPF (ref 0–2)
SODIUM SERPL-SCNC: 140 MMOL/L (ref 134–144)
SP GR UR STRIP: 1.01 (ref 1–1.03)
T3FREE SERPL-MCNC: 3.7 PG/ML (ref 2–4.4)
T4 FREE SERPL-MCNC: 1.23 NG/DL (ref 0.82–1.77)
TRIGL SERPL-MCNC: 87 MG/DL (ref 0–149)
TSH SERPL DL<=0.005 MIU/L-ACNC: 0.91 UIU/ML (ref 0.45–4.5)
URATE SERPL-MCNC: 5.2 MG/DL (ref 3.8–8.4)
UROBILINOGEN UR STRIP-MCNC: 0.2 MG/DL (ref 0.2–1)
WBC # BLD AUTO: 7.3 X10E3/UL (ref 3.4–10.8)
WBC #/AREA URNS HPF: NORMAL /HPF (ref 0–5)

## 2022-11-02 PROCEDURE — 99214 OFFICE O/P EST MOD 30 MIN: CPT | Performed by: INTERNAL MEDICINE

## 2022-11-02 NOTE — PROGRESS NOTES
11/02/2022    Patient Information  Vernon Willis                                                                                          [address]  [facility] [address]      1950  [unfilled]  There is no work phone number on file.    Chief Complaint:     Follow-up blood work in order to monitor chronic medical issues listed in history of present illness.  No new acute complaints.    History of Present Illness:    Patient with multiple medical problems listed below and in the problem list presents today for follow-up with lab prior in order to monitor his chronic medical issues.  Past medical history reviewed and updated were necessary including health maintenance parameters.  This reveals he is due for colonoscopy.    Review of Systems   Constitutional: Negative.   HENT: Negative.    Eyes: Negative.    Cardiovascular: Negative.    Respiratory: Negative.    Endocrine: Negative.    Hematologic/Lymphatic: Negative.    Skin: Negative.    Musculoskeletal: Negative.    Gastrointestinal: Negative.    Genitourinary: Negative.    Neurological: Negative.    Psychiatric/Behavioral: Negative.    Allergic/Immunologic: Negative.        Active Problems:    Patient Active Problem List   Diagnosis   • History of abdominal aortic aneurysm (AAA), 6/23/2017--endovascular AAA repair with stent.   • Occlusive coronary artery disease, 12/2/2015--cardiac cath: LIMA to LAD (patent); RADHA to RCA (occluded 100%); SVG to obtuse marginal (occluded 100%); SVG to diagonal (occluded 100%).     • Hyperlipidemia   • History of gout   • Chronic allergic conjunctivitis   • Vitamin D deficiency   • Benign prostatic hyperplasia with weak urinary stream   • Therapeutic drug monitoring   • History of stroke   • History of PTCA, 7/15/2013--drug-eluting stent proximal and origin of LM.   • Hx of CABG, 2004--LIMA to LAD; RDAHA to RCA; SVG to obtuse marginal; SVG to diagonal.   • Pulmonary emphysema (HCC)   • Multiple pulmonary  nodules, 3/17/2021--stable 3.1 millimeter left upper lobe nodule.  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  Yearly screening recommen   • History of colon polyps   • Genital warts   • Allergic rhinitis   • Gastroesophageal reflux disease with esophagitis   • Proctalgia fugax   • Macrocytosis   • Impaired fasting glucose   • Heart palpitations   • Former cigarette smoker   • Bilateral carotid artery stenosis, 7/1/2021--60-70% right; 50-60% left.         Past Medical History:   Diagnosis Date   • Allergic rhinitis 8/13/2019   • Benign prostatic hyperplasia with weak urinary stream 8/13/2019   • Bilateral carotid artery stenosis, 7/1/2021--60-70% right; 50-60% left. 11/22/2021 July 1, 2021--carotid Doppler study reveals 60-70% right ICA stenosis; 50-60% left ICA stenosis.   • Chronic allergic conjunctivitis 8/13/2019   • Cigarette nicotine dependence  8/13/2019   • Former cigarette smoker 11/16/2021   • Gastroesophageal reflux disease with esophagitis 8/13/2019   • Genital warts 8/13/2019   • Heart palpitations 11/25/2020   • History of abdominal aortic aneurysm (AAA), 6/23/2017--endovascular AAA repair with stent. 8/13/2019 August 1, 2017--CTA of the abdomen: There has been endovascular repair of the abdominal aortic aneurysm with stent extending from just below the origin of the left renal vein into both common iliac arteries. No evidence for endoleak on arterial phase imaging. The excluded aneurysmal sac measures 4.8 cm in maximal diameter, previously 5.0 cm. Aortoiliac atherosclerosis with moderate narrowing at th   • History of cardiac catheterization 8/13/2019 December 2, 2015--cardiac catheterization.  INDICATION(S): This is a 64-year-old male with a history of coronary artery disease status post coronary artery bypass grafting. He presented to Dr. Clarke with symptoms consistent with his prior angina. He underwent a nuclear stress test that showed a  mid-anterior wall myocardial infarction with a small amount of ischemia in the ventricular apex. He was s   • History of colon polyps 8/13/2019 February 2016--colonoscopy with polypectomy.  Pathology not known.  2010--colonoscopy revealed a serrated adenoma.   • History of gout 8/13/2019   • History of posterior vitreous detachment of right eye, 10/15/2016--repair. 8/13/2019 October 5, 2016--vitrectomy, membrane peel, panretinal endophotocoagulation laser, right eye for preretinal membrane, vitreous hemorrhage, and posterior vitreous detachment of right eye.        • History of PTCA, 7/15/2013--drug-eluting stent proximal and origin of LM. 8/13/2019    July 15, 2013--successful drug-eluting stent PCI to the proximal and origin of the left main using vascular ultrasound guidance.   • History of stroke, questionable. 8/13/2019          • Hx of CABG, 2004--LIMA to LAD; RADHA to RCA; SVG to obtuse marginal; SVG to diagonal. 8/13/2019 December 2, 2015--cardiac catheterization: LIMA to LAD (patent); RADHA to RCA (occluded 100%); SVG to obtuse marginal (occluded 100%); SVG to diagonal (occluded 100%).  2004--four-vessel CABG.  LIMA to LAD; RADHA to RCA; SVG to obtuse marginal; SVG to diagonal.   • Hyperlipidemia 8/13/2019   • Impaired fasting glucose 5/21/2020   • Macrocytosis 8/13/2019 July 25, 2019--MCV elevated at 102.2.  Vitamin B12 level was above normal at 1208.  Methylmalonic acid was not performed.   • Multiple pulmonary nodules, 3/17/2021--stable 3.1 millimeter left upper lobe nodule.  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  Yearly screening recommen 8/13/2019 March 17, 2021--CT scan of the chest reveals a solitary stable left upper lobe 3.5 mm nodule.  Persistent reticulonodular interstitial disease in the right middle lobe likely to be RB-ILD in this patient with smoking history and not neoplastic.  Patient is to return to annual lung screening.  Thus,  Fleischner's criteria not applied.  January 29, 2019--CT scan of the chest without contrast performe   • Occlusive coronary artery disease, 12/2/2015--cardiac cath: LIMA to LAD (patent); RADHA to RCA (occluded 100%); SVG to obtuse marginal (occluded 100%); SVG to diagonal (occluded 100%).   8/13/2019 December 2, 2015--cardiac catheterization: LIMA to LAD (patent); RADHA to RCA (occluded 100%); SVG to obtuse marginal (occluded 100%); SVG to diagonal (occluded 100%).  July 15, 2013--successful drug-eluting stent PCI to the proximal and origin of the left main using vascular ultrasound guidance.  2004--four-vessel CABG.  LIMA to LAD; RADHA to RCA; SVG to obtuse marginal; SVG to diagonal.   HPI: Fra   • Overweight (BMI 25.0-29.9) 5/2/2022   • Proctalgia fugax 8/13/2019   • Pulmonary emphysema (HCC) 8/13/2019 January 29, 2019--CT scan of the chest without contrast performed for abnormal CT scan reveals minimal change in clustered nodularity and tree-in-bud nodularity involving dominantly the upper lobes and right middle lobe, likely postinfectious or postinflammatory.  A few new areas of peripheral mucus plugging noted at the right lower lobe.  No new or enlarging pulmonary nodules.  Return to annual s   • Vitamin D deficiency 8/13/2019         Past Surgical History:   Procedure Laterality Date   • ABDOMINAL AORTIC ANEURYSM REPAIR W/ ENDOLUMINAL GRAFT  06/23/2017 June 23, 2017--endovascular repair of AAA.   • CARDIAC CATHETERIZATION  12/02/2015 December 2, 2015--cardiac catheterization.  See past medical history for details.   • CATARACT EXTRACTION, BILATERAL     • COLONOSCOPY  2010 2010--colonoscopy revealed a serrated adenoma.   • COLONOSCOPY W/ POLYPECTOMY  02/2016 February 2016--colonoscopy with polypectomy.  Pathology not known.   • CORONARY ANGIOPLASTY WITH STENT PLACEMENT  07/15/2013    July 15, 2013--successful drug-eluting stent PCI to the proximal and origin of the left main using vascular  ultrasound guidance.   • CORONARY ARTERY BYPASS GRAFT  2004 2004--four-vessel CABG.  LIMA to LAD; RADHA to RCA; SVG to obtuse marginal; SVG to diagonal.   • PARS PLANA VITRECTOMY W/ ENDOPHOTOCOAGULATION  10/05/2016    October 5, 2016--vitrectomy, membrane peel, panretinal endophotocoagulation laser, right eye for preretinal membrane, vitreous hemorrhage, and posterior vitreous detachment of right eye.   • UPPER GASTROINTESTINAL ENDOSCOPY  02/2016 February 2016--EGD reportedly revealed esophagitis and gastritis.         Allergies   Allergen Reactions   • Codeine Hives   • Msg [Monosodium Glutamate] Other (See Comments)     Passes out   • Penicillins Hives           Current Outpatient Medications:   •  ascorbic acid (VITAMIN C) 1000 MG tablet, , Disp: , Rfl:   •  aspirin 81 MG tablet, Take 81 mg by mouth Daily., Disp: , Rfl:   •  atorvastatin (LIPITOR) 80 MG tablet, TAKE 1 TABLET EVERY DAY FOR HIGH CHOLESTEROL, Disp: 90 tablet, Rfl: 2  •  chlorhexidine (Periogard) 0.12 % solution, Apply 15 mL to the mouth or throat 2 (Two) Times a Day., Disp: 2838 mL, Rfl: 2  •  Cholecalciferol (VITAMIN D3) 5000 units capsule capsule, Take  by mouth., Disp: , Rfl:   •  clindamycin (CLINDAGEL) 1 % gel, APPLY TO AFFECTED AREA(S) TWO TIMES A DAY AS DIRECTED, Disp: 60 g, Rfl: 5  •  clopidogrel (PLAVIX) 75 MG tablet, Take 1 p.o. daily for blood thinner, Disp: 30 tablet, Rfl:   •  Cyanocobalamin (B-12) 1000 MCG tablet, Take 1 tablet by mouth Daily., Disp: , Rfl:   •  desonide (DESOWEN) 0.05 % cream, Apply  topically to the appropriate area as directed., Disp: , Rfl:   •  diazePAM (Valium) 2 MG tablet, Take 1 tablet by mouth 2 (Two) Times a Day As Needed for Anxiety., Disp: 60 tablet, Rfl: 1  •  erythromycin (ROMYCIN) 5 MG/GM ophthalmic ointment, PUT ON QTIP AND APPLY TO EYELID AT BEDTIME, Disp: , Rfl:   •  esomeprazole (nexIUM) 40 MG capsule, TAKE ONE CAPSULE BY MOUTH DAILY BEFORE FIRST MEAL FOR REFLUX, Disp: 90 capsule, Rfl: 2  •   ezetimibe (ZETIA) 10 MG tablet, TAKE 1 TABLET EVERY DAY FOR HIGH CHOLESTEROL, Disp: 90 tablet, Rfl: 2  •  fluorometholone (FML) 0.1 % ophthalmic suspension, INSTILL 1 DROP INTO EACH EYE TWICE DAILY, Disp: , Rfl: 5  •  fluticasone (FLONASE) 50 MCG/ACT nasal spray, USE 2 SPRAYS IN EACH NOSTRIL ONE TIME DAILY AS NEEDED, Disp: 16 g, Rfl: 6  •  hydrocortisone 2.5 % cream, APPLY TO AFFECTED AREA(S) TWO TIMES A DAY AS DIRECTED, Disp: 30 g, Rfl: 2  •  metoprolol succinate XL (TOPROL-XL) 25 MG 24 hr tablet, TAKE 1 TABLET BY MOUTH DAILY ALONG WITH THE 50MG TABLET AS NEEDED FOR PALPITATIONS., Disp: , Rfl:   •  metoprolol succinate XL (TOPROL-XL) 50 MG 24 hr tablet, , Disp: , Rfl:   •  podofilox (CONDYLOX) 0.5 % external solution, Apply  topically to the appropriate area as directed., Disp: , Rfl:   •  Sodium Fluoride (PreviDent 5000 Booster Plus) 1.1 % paste, Use as directed orally twice daily, Disp: 200 mL, Rfl: 11  •  tamsulosin (FLOMAX) 0.4 MG capsule 24 hr capsule, Take 1 p.o. twice daily for prostate, Disp: 60 capsule, Rfl: 6  •  Vitamin A 2400 MCG (8000 UT) capsule, Take  by mouth Daily., Disp: , Rfl:   •  Zinc 100 MG tablet, , Disp: , Rfl:       Family History   Problem Relation Age of Onset   • Diabetes type II Mother    • Coronary artery disease Father    • Diabetes type II Father    • Kidney disease Father    • Hypertension Father    • Crohn's disease Sister    • Coronary artery disease Brother         Brother  of a myocardial infarction at age 53         Social History     Socioeconomic History   • Marital status:    Tobacco Use   • Smoking status: Former     Packs/day: 0.50     Types: Cigarettes     Start date:      Quit date: 2020     Years since quittin.8   • Smokeless tobacco: Never   Vaping Use   • Vaping Use: Never used   Substance and Sexual Activity   • Alcohol use: No   • Drug use: No   • Sexual activity: Not Currently     Partners: Female         Vitals:    22 0931   BP:  "140/60   Pulse: 75   Resp: 18   SpO2: 99%   Weight: 68 kg (150 lb)   Height: 165.1 cm (65\")        Body mass index is 24.96 kg/m².      Physical Exam:    General: Alert and oriented x 3.  No acute distress.  Normal affect.  HEENT: Pupils equal, round, reactive to light; extraocular movements intact; sclerae nonicteric; pharynx, ear canals and TMs normal.  Neck: Without JVD, thyromegaly, bruit, or adenopathy.  Lungs: Clear to auscultation in all fields.  Heart: Regular rate and rhythm without murmur, rub, gallop, or click.  Abdomen: Soft, nontender, without hepatosplenomegaly or hernia.  Bowel sounds normal.  : Deferred.  Rectal: Deferred.  Extremities: Without clubbing, cyanosis, edema, or pulse deficit.  Neurologic: Intact without focal deficit.  Normal station and gait observed during ingress and egress from the examination room.  Skin: Without significant lesion.  Musculoskeletal: Unremarkable.    Lab/other results:    NMR reveals total cholesterol of 130, triglycerides 87, LDL particle #975, HDL particle 34.5.  CMP is normal.  Urinalysis reveals 1+ occult blood.  Microscopic exam is negative.  CBC is normal.  Hemoglobin A1c 5.5.  Thyroid function test normal.  PSA normal at 0.3.  Vitamin D normal at 65.3.  Homocystine is normal at 9.0.  Methylmalonic acid is pending.  Uric acid 5.2.  CPK normal.    Assessment/Plan:     Diagnosis Plan   1. Hyperlipidemia        2. History of gout        3. Macrocytosis        4. Impaired fasting glucose        5. Vitamin D deficiency        6. Occlusive coronary artery disease, 12/2/2015--cardiac cath: LIMA to LAD (patent); RADHA to RCA (occluded 100%); SVG to obtuse marginal (occluded 100%); SVG to diagonal (occluded 100%).          7. Benign prostatic hyperplasia with weak urinary stream        8. History of stroke        9. History of PTCA, 7/15/2013--drug-eluting stent proximal and origin of LM.        10. Hx of CABG, 2004--LIMA to LAD; RADHA to RCA; SVG to obtuse marginal; " SVG to diagonal.        11. Panlobular emphysema (HCC)        12. Multiple pulmonary nodules, 3/17/2021--stable 3.1 millimeter left upper lobe nodule.  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  Yearly screening recommen        13. History of colon polyps        14. Gastroesophageal reflux disease with esophagitis without hemorrhage        15. Heart palpitations        16. Bilateral carotid artery stenosis, 7/1/2021--60-70% right; 50-60% left.        17. Therapeutic drug monitoring          Patient has multiple medical problems as noted above all of which seem to be stable.  Methylmalonic acid levels are pending and I will contact patient when the results are available.    Plan is as follows: No change in current medical regimen.  Patient will follow-up on or after May 2, 2023 for his wellness visit and we will do lab that day.        Procedures

## 2022-11-06 DIAGNOSIS — R00.2 HEART PALPITATIONS: ICD-10-CM

## 2022-11-06 DIAGNOSIS — K59.4 PROCTALGIA FUGAX: ICD-10-CM

## 2022-11-09 RX ORDER — DIAZEPAM 2 MG/1
TABLET ORAL
Qty: 60 TABLET | Refills: 3 | Status: SHIPPED | OUTPATIENT
Start: 2022-11-09

## 2022-11-10 DIAGNOSIS — E55.9 VITAMIN D DEFICIENCY: Chronic | ICD-10-CM

## 2022-11-10 DIAGNOSIS — E78.2 MIXED HYPERLIPIDEMIA: Chronic | ICD-10-CM

## 2022-11-10 DIAGNOSIS — Z87.39 HISTORY OF GOUT: Chronic | ICD-10-CM

## 2022-11-10 DIAGNOSIS — R73.01 IMPAIRED FASTING GLUCOSE: Chronic | ICD-10-CM

## 2022-11-10 DIAGNOSIS — D75.89 MACROCYTOSIS: Chronic | ICD-10-CM

## 2022-12-13 DIAGNOSIS — J30.1 SEASONAL ALLERGIC RHINITIS DUE TO POLLEN: Chronic | ICD-10-CM

## 2022-12-13 RX ORDER — FLUTICASONE PROPIONATE 50 MCG
SPRAY, SUSPENSION (ML) NASAL
Qty: 16 G | Refills: 6 | Status: SHIPPED | OUTPATIENT
Start: 2022-12-13

## 2023-01-12 ENCOUNTER — AMBULATORY SURGICAL CENTER (OUTPATIENT)
Dept: URBAN - METROPOLITAN AREA SURGERY 20 | Facility: SURGERY | Age: 73
End: 2023-01-12

## 2023-01-12 ENCOUNTER — OFFICE (OUTPATIENT)
Dept: URBAN - METROPOLITAN AREA PATHOLOGY 4 | Facility: PATHOLOGY | Age: 73
End: 2023-01-12
Payer: MEDICARE

## 2023-01-12 VITALS — HEIGHT: 69 IN

## 2023-01-12 DIAGNOSIS — K63.5 POLYP OF COLON: ICD-10-CM

## 2023-01-12 DIAGNOSIS — K64.0 FIRST DEGREE HEMORRHOIDS: ICD-10-CM

## 2023-01-12 DIAGNOSIS — Z12.11 ENCOUNTER FOR SCREENING FOR MALIGNANT NEOPLASM OF COLON: ICD-10-CM

## 2023-01-12 DIAGNOSIS — Z86.010 PERSONAL HISTORY OF COLONIC POLYPS: ICD-10-CM

## 2023-01-12 DIAGNOSIS — K57.30 DIVERTICULOSIS OF LARGE INTESTINE WITHOUT PERFORATION OR ABS: ICD-10-CM

## 2023-01-12 LAB
GI HISTOLOGY: A. SELECT: (no result)
GI HISTOLOGY: PDF REPORT: (no result)

## 2023-01-12 PROCEDURE — 45380 COLONOSCOPY AND BIOPSY: CPT | Mod: PT | Performed by: INTERNAL MEDICINE

## 2023-01-12 PROCEDURE — 88305 TISSUE EXAM BY PATHOLOGIST: CPT | Performed by: INTERNAL MEDICINE

## 2023-01-12 NOTE — SERVICENOTES
Thank you for allowing us to participate in your patient's care. anxiety disorder/bipolar affective disorder

## 2023-02-13 DIAGNOSIS — E78.2 MIXED HYPERLIPIDEMIA: Chronic | ICD-10-CM

## 2023-02-13 RX ORDER — CHLORHEXIDINE GLUCONATE 0.12 MG/ML
RINSE ORAL
Qty: 2838 ML | Refills: 2 | Status: SHIPPED | OUTPATIENT
Start: 2023-02-13

## 2023-02-13 RX ORDER — EZETIMIBE 10 MG/1
TABLET ORAL
Qty: 90 TABLET | Refills: 2 | Status: SHIPPED | OUTPATIENT
Start: 2023-02-13

## 2023-02-23 RX ORDER — DESONIDE 0.5 MG/G
CREAM TOPICAL 2 TIMES DAILY
Qty: 60 G | Refills: 1 | Status: SHIPPED | OUTPATIENT
Start: 2023-02-23

## 2023-04-15 LAB
25(OH)D3+25(OH)D2 SERPL-MCNC: 62.1 NG/ML (ref 30–100)
ALBUMIN SERPL-MCNC: 4.5 G/DL (ref 3.7–4.7)
ALBUMIN/GLOB SERPL: 2.3 {RATIO} (ref 1.2–2.2)
ALP SERPL-CCNC: 85 IU/L (ref 44–121)
ALT SERPL-CCNC: 23 IU/L (ref 0–44)
AST SERPL-CCNC: 37 IU/L (ref 0–40)
BILIRUB SERPL-MCNC: 0.3 MG/DL (ref 0–1.2)
BUN SERPL-MCNC: 17 MG/DL (ref 8–27)
BUN/CREAT SERPL: 20 (ref 10–24)
CALCIUM SERPL-MCNC: 9.7 MG/DL (ref 8.6–10.2)
CHLORIDE SERPL-SCNC: 105 MMOL/L (ref 96–106)
CHOLEST SERPL-MCNC: 124 MG/DL (ref 100–199)
CK SERPL-CCNC: 395 U/L (ref 41–331)
CO2 SERPL-SCNC: 25 MMOL/L (ref 20–29)
CREAT SERPL-MCNC: 0.83 MG/DL (ref 0.76–1.27)
EGFRCR SERPLBLD CKD-EPI 2021: 93 ML/MIN/1.73
ERYTHROCYTE [DISTWIDTH] IN BLOOD BY AUTOMATED COUNT: 11.4 % (ref 11.6–15.4)
GLOBULIN SER CALC-MCNC: 2 G/DL (ref 1.5–4.5)
GLUCOSE SERPL-MCNC: 100 MG/DL (ref 70–99)
HBA1C MFR BLD: 5.4 % (ref 4.8–5.6)
HCT VFR BLD AUTO: 36.7 % (ref 37.5–51)
HDL SERPL-SCNC: 39.7 UMOL/L
HDLC SERPL-MCNC: 56 MG/DL
HGB BLD-MCNC: 12.2 G/DL (ref 13–17.7)
LDL SERPL QN: 20.4 NM
LDL SERPL-SCNC: 861 NMOL/L
LDL SMALL SERPL-SCNC: 521 NMOL/L
LDLC SERPL CALC-MCNC: 56 MG/DL (ref 0–99)
MCH RBC QN AUTO: 33.5 PG (ref 26.6–33)
MCHC RBC AUTO-ENTMCNC: 33.2 G/DL (ref 31.5–35.7)
MCV RBC AUTO: 101 FL (ref 79–97)
PLATELET # BLD AUTO: 163 X10E3/UL (ref 150–450)
POTASSIUM SERPL-SCNC: 5.1 MMOL/L (ref 3.5–5.2)
PROT SERPL-MCNC: 6.5 G/DL (ref 6–8.5)
RBC # BLD AUTO: 3.64 X10E6/UL (ref 4.14–5.8)
SODIUM SERPL-SCNC: 143 MMOL/L (ref 134–144)
T3FREE SERPL-MCNC: 3.7 PG/ML (ref 2–4.4)
T4 FREE SERPL-MCNC: 1.14 NG/DL (ref 0.82–1.77)
TRIGL SERPL-MCNC: 56 MG/DL (ref 0–149)
TSH SERPL DL<=0.005 MIU/L-ACNC: 1.19 UIU/ML (ref 0.45–4.5)
URATE SERPL-MCNC: 4.9 MG/DL (ref 3.8–8.4)
WBC # BLD AUTO: 7 X10E3/UL (ref 3.4–10.8)

## 2023-04-20 DIAGNOSIS — K21.00 GASTROESOPHAGEAL REFLUX DISEASE WITH ESOPHAGITIS: ICD-10-CM

## 2023-04-20 RX ORDER — ESOMEPRAZOLE MAGNESIUM 40 MG/1
CAPSULE, DELAYED RELEASE ORAL
Qty: 90 CAPSULE | Refills: 2 | Status: SHIPPED | OUTPATIENT
Start: 2023-04-20

## 2023-05-02 ENCOUNTER — TELEPHONE (OUTPATIENT)
Dept: INTERNAL MEDICINE | Facility: CLINIC | Age: 73
End: 2023-05-02
Payer: MEDICARE

## 2023-05-02 DIAGNOSIS — R39.12 BENIGN PROSTATIC HYPERPLASIA WITH WEAK URINARY STREAM: Chronic | ICD-10-CM

## 2023-05-02 DIAGNOSIS — E78.2 MIXED HYPERLIPIDEMIA: Primary | Chronic | ICD-10-CM

## 2023-05-02 DIAGNOSIS — Z87.39 HISTORY OF GOUT: Chronic | ICD-10-CM

## 2023-05-02 DIAGNOSIS — E55.9 VITAMIN D DEFICIENCY: Chronic | ICD-10-CM

## 2023-05-02 DIAGNOSIS — I65.23 BILATERAL CAROTID ARTERY STENOSIS: Chronic | ICD-10-CM

## 2023-05-02 DIAGNOSIS — D75.89 MACROCYTOSIS: Chronic | ICD-10-CM

## 2023-05-02 DIAGNOSIS — R73.01 IMPAIRED FASTING GLUCOSE: Chronic | ICD-10-CM

## 2023-05-02 DIAGNOSIS — N40.1 BENIGN PROSTATIC HYPERPLASIA WITH WEAK URINARY STREAM: Chronic | ICD-10-CM

## 2023-05-03 DIAGNOSIS — R00.2 HEART PALPITATIONS: ICD-10-CM

## 2023-05-03 DIAGNOSIS — K59.4 PROCTALGIA FUGAX: ICD-10-CM

## 2023-05-04 RX ORDER — CLINDAMYCIN PHOSPHATE 10 MG/G
GEL TOPICAL SEE ADMIN INSTRUCTIONS
Qty: 60 G | Refills: 5 | Status: SHIPPED | OUTPATIENT
Start: 2023-05-04

## 2023-05-04 RX ORDER — DIAZEPAM 2 MG/1
2 TABLET ORAL 2 TIMES DAILY PRN
Qty: 60 TABLET | Refills: 3 | Status: SHIPPED | OUTPATIENT
Start: 2023-05-04

## 2023-05-18 ENCOUNTER — TRANSCRIBE ORDERS (OUTPATIENT)
Dept: ADMINISTRATIVE | Facility: HOSPITAL | Age: 73
End: 2023-05-18
Payer: MEDICARE

## 2023-05-18 DIAGNOSIS — I77.9 MILD CAROTID ARTERY DISEASE: Primary | ICD-10-CM

## 2023-06-15 DIAGNOSIS — J30.1 SEASONAL ALLERGIC RHINITIS DUE TO POLLEN: Chronic | ICD-10-CM

## 2023-06-15 RX ORDER — FLUTICASONE PROPIONATE 50 MCG
SPRAY, SUSPENSION (ML) NASAL
Qty: 16 G | Refills: 6 | Status: SHIPPED | OUTPATIENT
Start: 2023-06-15

## 2023-09-21 ENCOUNTER — TELEPHONE (OUTPATIENT)
Dept: INTERNAL MEDICINE | Facility: CLINIC | Age: 73
End: 2023-09-21

## 2023-09-21 NOTE — TELEPHONE ENCOUNTER
Caller: Vernon Willis    Relationship to patient: Self    Best call back number: [phone]    Patient is needing: PATIENT IS REQUESTING A CALLBACK TO DISCUSS NEED FOR ORDER.    HUB UNABLE TO OBTAIN ANY FURTHER DETAILS. PLEASE ADVISE.

## 2023-09-21 NOTE — TELEPHONE ENCOUNTER
PT CALLED BACK STATED THAT HE IS LEAVING TO GO OUT OF TOWN AND  NO LONGER HAS INTERNET AND CANNOT ACCESS HIS [employer] ACCOUNT. STATES THIS IS IN REGARDS TO HIS CT ORDER & LAB ORDER.

## 2023-10-09 PROBLEM — Z86.010 HISTORY OF ADENOMATOUS POLYP OF COLON: Status: ACTIVE | Noted: 2022-09-06

## 2023-10-09 PROBLEM — Z86.0101 HISTORY OF ADENOMATOUS POLYP OF COLON: Status: ACTIVE | Noted: 2022-09-06

## 2023-11-06 DIAGNOSIS — R00.2 HEART PALPITATIONS: ICD-10-CM

## 2023-11-06 DIAGNOSIS — K59.4 PROCTALGIA FUGAX: ICD-10-CM

## 2023-11-06 RX ORDER — DIAZEPAM 2 MG/1
2 TABLET ORAL 2 TIMES DAILY PRN
Qty: 60 TABLET | Refills: 3 | Status: SHIPPED | OUTPATIENT
Start: 2023-11-06

## 2023-11-07 RX ORDER — DIAZEPAM 2 MG/1
2 TABLET ORAL 2 TIMES DAILY PRN
Qty: 60 TABLET | Refills: 3 | OUTPATIENT
Start: 2023-11-07

## 2023-12-22 ENCOUNTER — TELEPHONE (OUTPATIENT)
Dept: INTERNAL MEDICINE | Facility: CLINIC | Age: 73
End: 2023-12-22

## 2023-12-22 NOTE — TELEPHONE ENCOUNTER
We are glad he's going to stay, and we appreciate the well wishes!  [facility]ris Cueto to [facility] [employer]!

## 2023-12-22 NOTE — TELEPHONE ENCOUNTER
PATIENT WANTED TO WISH DR. HOLCOMB AND HIS TEAM PATRICIA AVILA! PATIENT STATES THAT HE HAD THE [employer] PLAN AND HE HAD TO RESCHEDULE HIS WELLNESS EXAM FROM NOVEMBER. PATIENT STATES THAT DR. HOLCOMB IS THE BEST DOCTOR AND HE DID CHANGE HIS INSURANCE TO STAY WITH DR. HOLCOMB!

## 2023-12-29 DIAGNOSIS — E78.2 MIXED HYPERLIPIDEMIA: Chronic | ICD-10-CM

## 2023-12-29 RX ORDER — EZETIMIBE 10 MG/1
TABLET ORAL
Qty: 90 TABLET | Refills: 0 | Status: SHIPPED | OUTPATIENT
Start: 2023-12-29

## 2024-01-01 DIAGNOSIS — K21.00 GASTROESOPHAGEAL REFLUX DISEASE WITH ESOPHAGITIS: ICD-10-CM

## 2024-01-02 RX ORDER — ESOMEPRAZOLE MAGNESIUM 40 MG/1
CAPSULE, DELAYED RELEASE ORAL
Qty: 90 CAPSULE | Refills: 2 | Status: SHIPPED | OUTPATIENT
Start: 2024-01-02

## 2024-01-05 RX ORDER — SODIUM FLUORIDE 6 MG/ML
PASTE, DENTIFRICE DENTAL
Qty: 100 ML | Refills: 3 | Status: SHIPPED | OUTPATIENT
Start: 2024-01-05

## 2024-01-23 ENCOUNTER — OFFICE VISIT (OUTPATIENT)
Dept: INTERNAL MEDICINE | Facility: CLINIC | Age: 74
End: 2024-01-23
Payer: MEDICARE

## 2024-01-23 VITALS
RESPIRATION RATE: 12 BRPM | DIASTOLIC BLOOD PRESSURE: 66 MMHG | WEIGHT: 156.8 LBS | HEART RATE: 69 BPM | HEIGHT: 65 IN | OXYGEN SATURATION: 95 % | SYSTOLIC BLOOD PRESSURE: 132 MMHG | BODY MASS INDEX: 26.12 KG/M2

## 2024-01-23 DIAGNOSIS — Z95.1 HX OF CABG: Chronic | ICD-10-CM

## 2024-01-23 DIAGNOSIS — E78.2 MIXED HYPERLIPIDEMIA: Chronic | ICD-10-CM

## 2024-01-23 DIAGNOSIS — N40.1 BENIGN PROSTATIC HYPERPLASIA WITH WEAK URINARY STREAM: Chronic | ICD-10-CM

## 2024-01-23 DIAGNOSIS — K21.00 GASTROESOPHAGEAL REFLUX DISEASE WITH ESOPHAGITIS WITHOUT HEMORRHAGE: Chronic | ICD-10-CM

## 2024-01-23 DIAGNOSIS — Z51.81 THERAPEUTIC DRUG MONITORING: ICD-10-CM

## 2024-01-23 DIAGNOSIS — Z00.00 ENCOUNTER FOR SUBSEQUENT ANNUAL WELLNESS VISIT (AWV) IN MEDICARE PATIENT: Primary | ICD-10-CM

## 2024-01-23 DIAGNOSIS — D75.89 MACROCYTOSIS: Chronic | ICD-10-CM

## 2024-01-23 DIAGNOSIS — J43.1 PANLOBULAR EMPHYSEMA: Chronic | ICD-10-CM

## 2024-01-23 DIAGNOSIS — Z86.16 HISTORY OF 2019 NOVEL CORONAVIRUS DISEASE (COVID-19): ICD-10-CM

## 2024-01-23 DIAGNOSIS — Z87.891 FORMER CIGARETTE SMOKER: Chronic | ICD-10-CM

## 2024-01-23 DIAGNOSIS — R39.12 BENIGN PROSTATIC HYPERPLASIA WITH WEAK URINARY STREAM: Chronic | ICD-10-CM

## 2024-01-23 DIAGNOSIS — Z86.010 HISTORY OF COLON POLYPS: Chronic | ICD-10-CM

## 2024-01-23 DIAGNOSIS — I25.10 OCCLUSIVE CORONARY ARTERY DISEASE: Chronic | ICD-10-CM

## 2024-01-23 DIAGNOSIS — R73.01 IMPAIRED FASTING GLUCOSE: Chronic | ICD-10-CM

## 2024-01-23 DIAGNOSIS — R91.8 MULTIPLE PULMONARY NODULES: Chronic | ICD-10-CM

## 2024-01-23 DIAGNOSIS — I65.23 BILATERAL CAROTID ARTERY STENOSIS: Chronic | ICD-10-CM

## 2024-01-23 DIAGNOSIS — Z86.73 HISTORY OF STROKE: Chronic | ICD-10-CM

## 2024-01-23 DIAGNOSIS — K59.4 PROCTALGIA FUGAX: Chronic | ICD-10-CM

## 2024-01-23 DIAGNOSIS — E55.9 VITAMIN D DEFICIENCY: Chronic | ICD-10-CM

## 2024-01-23 DIAGNOSIS — A63.0 GENITAL WARTS: Chronic | ICD-10-CM

## 2024-01-23 DIAGNOSIS — Z86.79 HISTORY OF ABDOMINAL AORTIC ANEURYSM (AAA): Chronic | ICD-10-CM

## 2024-01-23 DIAGNOSIS — Z87.39 HISTORY OF GOUT: Chronic | ICD-10-CM

## 2024-01-23 DIAGNOSIS — Z98.61 HISTORY OF PTCA: Chronic | ICD-10-CM

## 2024-01-23 PROBLEM — Z86.0101 HISTORY OF ADENOMATOUS POLYP OF COLON: Status: RESOLVED | Noted: 2022-09-06 | Resolved: 2024-01-23

## 2024-01-23 PROCEDURE — 1159F MED LIST DOCD IN RCRD: CPT | Performed by: INTERNAL MEDICINE

## 2024-01-23 PROCEDURE — 1170F FXNL STATUS ASSESSED: CPT | Performed by: INTERNAL MEDICINE

## 2024-01-23 PROCEDURE — G0439 PPPS, SUBSEQ VISIT: HCPCS | Performed by: INTERNAL MEDICINE

## 2024-01-23 PROCEDURE — 1160F RVW MEDS BY RX/DR IN RCRD: CPT | Performed by: INTERNAL MEDICINE

## 2024-01-23 RX ORDER — ROSUVASTATIN CALCIUM 40 MG/1
TABLET, COATED ORAL
Start: 2024-01-23

## 2024-01-23 NOTE — PROGRESS NOTES
The ABCs of the Annual Wellness Visit  Subsequent Medicare Wellness Visit    Subjective      Vernon Willis is a 73 y.o. male who presents for a Subsequent Medicare Wellness Visit.    The following portions of the patient's history were reviewed and   updated as appropriate: allergies, current medications, past family history, past medical history, past social history, past surgical history, and problem list.    Compared to one year ago, the patient feels his physical   health is the same.    Compared to one year ago, the patient feels his mental   health is the same.    Recent Hospitalizations:  He was not admitted to the hospital during the last year.       Current Medical Providers:  Patient Care Team:  Ryan Hutchinson MD as PCP - General (Internal Medicine)    Outpatient Medications Prior to Visit   Medication Sig Dispense Refill    ascorbic acid (VITAMIN C) 1000 MG tablet       aspirin 81 MG tablet Take 1 tablet by mouth Daily.      chlorhexidine (PERIDEX) 0.12 % solution USE 15 ML TO THE MOUTH OR THROAT 2 TIMES A DAY . SPIT AFTER RINSING (SUBSTITUTED FOR PERIOGARD) 2838 mL 2    Cholecalciferol (VITAMIN D3) 5000 units capsule capsule Take  by mouth.      clindamycin (CLINDAGEL) 1 % gel Apply  topically to the appropriate area as directed See Admin Instructions. Apply topically to the affected areas twice daily as directed 60 g 5    clopidogrel (PLAVIX) 75 MG tablet Take 1 p.o. daily for blood thinner 30 tablet     Cyanocobalamin (B-12) 1000 MCG tablet Take 1 tablet by mouth Daily.      desonide (DESOWEN) 0.05 % cream Apply  topically to the appropriate area as directed 2 (Two) Times a Day. 60 g 1    diazePAM (VALIUM) 2 MG tablet TAKE ONE TABLET BY MOUTH TWICE A DAY AS NEEDED FOR ANXIETY 60 tablet 3    erythromycin (ROMYCIN) 5 MG/GM ophthalmic ointment PUT ON QTIP AND APPLY TO EYELID AT BEDTIME      esomeprazole (nexIUM) 40 MG capsule TAKE 1 CAPSULE BY MOUTH DAILY BEFORE FIRST MEAL FOR REFLUX 90 capsule  2    ezetimibe (ZETIA) 10 MG tablet TAKE 1 TABLET EVERY DAY FOR HIGH CHOLESTEROL 90 tablet 0    fluorometholone (FML) 0.1 % ophthalmic suspension INSTILL 1 DROP INTO EACH EYE TWICE DAILY  5    fluticasone (FLONASE) 50 MCG/ACT nasal spray USE 2 SPRAYS IN EACH NOSTRIL ONE TIME DAILY AS NEEDED 16 g 6    hydrocortisone 2.5 % cream Apply  topically to the appropriate area as directed See Admin Instructions. Apply topically to the affected areas twice daily as directed 30 g 2    metoprolol succinate XL (TOPROL-XL) 25 MG 24 hr tablet TAKE 1 TABLET BY MOUTH DAILY ALONG WITH THE 50MG TABLET AS NEEDED FOR PALPITATIONS.      metoprolol succinate XL (TOPROL-XL) 50 MG 24 hr tablet       podofilox (CONDYLOX) 0.5 % external solution Apply  topically to the appropriate area as directed.      Sodium Fluoride (PreviDent 5000 Booster Plus) 1.1 % paste BRUSH TEETH TWO TIMES A DAY AS DIRECTED 100 mL 3    tamsulosin (FLOMAX) 0.4 MG capsule 24 hr capsule Take 1 p.o. twice daily for prostate 60 capsule 6    Vitamin A 2400 MCG (8000 UT) capsule Take  by mouth Daily.      Zinc 100 MG tablet       atorvastatin (LIPITOR) 80 MG tablet TAKE 1 TABLET EVERY DAY FOR HIGH CHOLESTEROL 90 tablet 2     No facility-administered medications prior to visit.       No opioid medication identified on active medication list. I have reviewed chart for other potential  high risk medication/s and harmful drug interactions in the elderly.        Aspirin is on active medication list. Aspirin use is indicated based on review of current medical condition/s. Pros and cons of this therapy have been discussed today. Benefits of this medication outweigh potential harm.  Patient has been encouraged to continue taking this medication.  .      Patient Active Problem List   Diagnosis    History of abdominal aortic aneurysm (AAA), 6/23/2017--endovascular AAA repair with stent.    Occlusive coronary artery disease, 12/2/2015--cardiac cath: LIMA to LAD (patent); RADHA to RCA  "(occluded 100%); SVG to obtuse marginal (occluded 100%); SVG to diagonal (occluded 100%).      Hyperlipidemia    History of gout    Chronic allergic conjunctivitis    Vitamin D deficiency    Benign prostatic hyperplasia with weak urinary stream    Therapeutic drug monitoring    History of stroke    History of PTCA, 7/15/2013--drug-eluting stent proximal and origin of LM.    Hx of CABG, 2004--LIMA to LAD; RADHA to RCA; SVG to obtuse marginal; SVG to diagonal.    Pulmonary emphysema    Multiple pulmonary nodules, 5/12/2022--no change.  3/17/2021--stable 3.1 millimeter left upper lobe nodule.  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    History of colon polyps    Genital warts    Allergic rhinitis    Gastroesophageal reflux disease with esophagitis without hemorrhage    Proctalgia fugax    Macrocytosis    Impaired fasting glucose    Heart palpitations    Former cigarette smoker    Bilateral carotid artery stenosis, 7/1/2021--60-70% right; 50-60% left.     Advance Care Planning   Advance Care Planning     Advance Directive is on file.  ACP discussion was held with the patient during this visit. Patient has an advance directive in EMR which is still valid.      Objective    Vitals:    01/23/24 0934   BP: 132/66   BP Location: Left arm   Patient Position: Sitting   Cuff Size: Adult   Pulse: 69   Resp: 12   SpO2: 95%   Weight: 71.1 kg (156 lb 12.8 oz)   Height: 165.1 cm (65\")   PainSc: 0-No pain     Estimated body mass index is 26.09 kg/m² as calculated from the following:    Height as of this encounter: 165.1 cm (65\").    Weight as of this encounter: 71.1 kg (156 lb 12.8 oz).           Does the patient have evidence of cognitive impairment?   No    Lab Results   Component Value Date    CHLPL 137 12/21/2023    TRIG 72 12/21/2023    HGBA1C 5.9 (H) 12/21/2023        CBC normal except hemoglobin low at 12.7.  Hematocrit normal at 37.7.  Platelets slightly low at 149.  CPK normal " at 106.  CMP entirely normal.  Hemoglobin A1c 5.9.  Total cholesterol 137, triglycerides 72, LDL particle #1073, HDL particle #35.5.  Thyroid function tests are normal.  PSA 0.2.  Urinalysis normal.  Vitamin D normal at 52.5.    HEALTH RISK ASSESSMENT    Smoking Status:  Social History     Tobacco Use   Smoking Status Former    Packs/day: .5    Types: Cigarettes    Start date:     Quit date: 2020    Years since quittin.0   Smokeless Tobacco Never     Alcohol Consumption:  Social History     Substance and Sexual Activity   Alcohol Use No     Fall Risk Screen:    STEADI Fall Risk Assessment was completed, and patient is at LOW risk for falls.Assessment completed on:2024    Depression Screenin/23/2024     9:48 AM   PHQ-2/PHQ-9 Depression Screening   Little Interest or Pleasure in Doing Things 0-->not at all   Feeling Down, Depressed or Hopeless 0-->not at all   PHQ-9: Brief Depression Severity Measure Score 0       Health Habits and Functional and Cognitive Screenin/23/2024     9:36 AM   Functional & Cognitive Status   Do you have difficulty preparing food and eating? No   Do you have difficulty bathing yourself, getting dressed or grooming yourself? No   Do you have difficulty using the toilet? No   Do you have difficulty moving around from place to place? No   Do you have trouble with steps or getting out of a bed or a chair? No   Current Diet Well Balanced Diet   Dental Exam Up to date   Eye Exam Up to date   Exercise (times per week) 4 times per week   Current Exercises Include Walking   Do you need help using the phone?  No   Are you deaf or do you have serious difficulty hearing?  No   Do you need help to go to places out of walking distance? No   Do you need help shopping? No   Do you need help preparing meals?  No   Do you need help with housework?  No   Do you need help with laundry? No   Do you need help taking your medications? No   Do you need help managing money? No   Do  you ever drive or ride in a car without wearing a seat belt? No   Have you felt unusual stress, anger or loneliness in the last month? No   Who do you live with? Alone   If you need help, do you have trouble finding someone available to you? No   Have you been bothered in the last four weeks by sexual problems? No   Do you have difficulty concentrating, remembering or making decisions? No       Age-appropriate Screening Schedule:  Refer to the list below for future screening recommendations based on patient's age, sex and/or medical conditions. Orders for these recommended tests are listed in the plan section. The patient has been provided with a written plan.    Health Maintenance   Topic Date Due    LIPID PANEL  12/21/2024    ANNUAL WELLNESS VISIT  01/23/2025    BMI FOLLOWUP  01/23/2025    COLORECTAL CANCER SCREENING  01/12/2028    HEPATITIS C SCREENING  Completed    INFLUENZA VACCINE  Completed    Pneumococcal Vaccine 65+  Completed    AAA SCREEN (ONE-TIME)  Completed    COVID-19 Vaccine  Discontinued    TDAP/TD VACCINES  Discontinued    ZOSTER VACCINE  Discontinued                  CMS Preventative Services Quick Reference  Risk Factors Identified During Encounter:    Immunizations Discussed/Encouraged: Influenza, COVID19, and RSV (Respiratory Syncytial Virus)    The above risks/problems have been discussed with the patient.  Pertinent information has been shared with the patient in the After Visit Summary.    Diagnoses and all orders for this visit:    1. Encounter for subsequent annual wellness visit (AWV) in Medicare patient (Primary)    2. Impaired fasting glucose  -     Comprehensive Metabolic Panel; Future  -     Hemoglobin A1c; Future  -     Urinalysis With Microscopic If Indicated (No Culture) - Urine, Clean Catch; Future    3. Hyperlipidemia  -     rosuvastatin (Crestor) 40 MG tablet; Take 1 p.o. daily for high cholesterol as directed  -     Comprehensive Metabolic Panel; Future  -     NMR LipoProfile;  Future  -     TSH; Future  -     T4, Free; Future  -     T3, Free; Future    4. History of gout  -     Uric Acid; Future    5. Vitamin D deficiency  -     Vitamin D,25-Hydroxy; Future    6. Benign prostatic hyperplasia with weak urinary stream  -     PSA DIAGNOSTIC; Future    7. Bilateral carotid artery stenosis, 7/1/2021--60-70% right; 50-60% left.    8. Former cigarette smoker    9. Gastroesophageal reflux disease with esophagitis without hemorrhage    10. Genital warts    11. History of abdominal aortic aneurysm (AAA), 6/23/2017--endovascular AAA repair with stent.    12. History of colon polyps    13. History of stroke    14. Occlusive coronary artery disease, 12/2/2015--cardiac cath: LIMA to LAD (patent); RADHA to RCA (occluded 100%); SVG to obtuse marginal (occluded 100%); SVG to diagonal (occluded 100%).      15. History of PTCA, 7/15/2013--drug-eluting stent proximal and origin of LM.    16. Hx of CABG, 2004--LIMA to LAD; RADHA to RCA; SVG to obtuse marginal; SVG to diagonal.    17. Macrocytosis  -     CBC (No Diff); Future    18. Multiple pulmonary nodules, 3/17/2021--stable 3.1 millimeter left upper lobe nodule.  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  Yearly screening recommen  -     CT Chest Without Contrast; Future    19. Proctalgia fugax    20. Panlobular emphysema  -     CT Chest Without Contrast; Future    21. Therapeutic drug monitoring  -     CBC (No Diff); Future    22. History of 2019 novel coronavirus disease (COVID-19)  -     SARS-CoV-2 Antibodies, Nucleocapsid (Natural Immunity); Future        Follow Up:   Next Medicare Wellness visit to be scheduled in 1 year.      An After Visit Summary and PPPS were made available to the patient.

## 2024-02-09 NOTE — TELEPHONE ENCOUNTER
Rx Refill Note  Requested Prescriptions     Pending Prescriptions Disp Refills    hydrocortisone 2.5 % cream [Pharmacy Med Name: HYDROCORTISONE 2.5% TOPICAL CREAM] 30 g 2     Sig: APPLY TO AFFECTED AREA(S) TOPICALLY AS DIRECTED TWO TIMES A DAY      Last office visit with prescribing clinician: 1/23/2024       Krishan Gomez MA  02/09/24, 15:25 EST

## 2024-03-15 DIAGNOSIS — J30.1 SEASONAL ALLERGIC RHINITIS DUE TO POLLEN: Chronic | ICD-10-CM

## 2024-03-15 DIAGNOSIS — K59.4 PROCTALGIA FUGAX: ICD-10-CM

## 2024-03-15 DIAGNOSIS — E78.2 MIXED HYPERLIPIDEMIA: Chronic | ICD-10-CM

## 2024-03-15 DIAGNOSIS — R00.2 HEART PALPITATIONS: ICD-10-CM

## 2024-03-17 DIAGNOSIS — K59.4 PROCTALGIA FUGAX: ICD-10-CM

## 2024-03-17 DIAGNOSIS — R00.2 HEART PALPITATIONS: ICD-10-CM

## 2024-03-18 RX ORDER — DIAZEPAM 2 MG/1
2 TABLET ORAL 2 TIMES DAILY PRN
Qty: 60 TABLET | OUTPATIENT
Start: 2024-03-18

## 2024-03-18 RX ORDER — DIAZEPAM 2 MG/1
2 TABLET ORAL 2 TIMES DAILY PRN
Qty: 60 TABLET | Refills: 3 | Status: SHIPPED | OUTPATIENT
Start: 2024-03-18

## 2024-03-18 RX ORDER — EZETIMIBE 10 MG/1
TABLET ORAL
Qty: 90 TABLET | Refills: 3 | Status: SHIPPED | OUTPATIENT
Start: 2024-03-18

## 2024-03-18 RX ORDER — FLUTICASONE PROPIONATE 50 MCG
SPRAY, SUSPENSION (ML) NASAL
Qty: 16 G | Refills: 6 | Status: SHIPPED | OUTPATIENT
Start: 2024-03-18

## 2024-05-01 PROBLEM — I65.29 CAROTID ARTERY STENOSIS: Status: ACTIVE | Noted: 2024-05-01

## 2024-05-01 PROBLEM — I71.40 ABDOMINAL AORTIC ANEURYSM: Status: ACTIVE | Noted: 2024-05-01

## 2024-05-20 DIAGNOSIS — R39.12 BENIGN PROSTATIC HYPERPLASIA WITH WEAK URINARY STREAM: Chronic | ICD-10-CM

## 2024-05-20 DIAGNOSIS — E55.9 VITAMIN D DEFICIENCY: Chronic | ICD-10-CM

## 2024-05-20 DIAGNOSIS — R73.01 IMPAIRED FASTING GLUCOSE: Chronic | ICD-10-CM

## 2024-05-20 DIAGNOSIS — Z87.39 HISTORY OF GOUT: Chronic | ICD-10-CM

## 2024-05-20 DIAGNOSIS — Z51.81 THERAPEUTIC DRUG MONITORING: ICD-10-CM

## 2024-05-20 DIAGNOSIS — E78.2 MIXED HYPERLIPIDEMIA: Chronic | ICD-10-CM

## 2024-05-20 DIAGNOSIS — D75.89 MACROCYTOSIS: Chronic | ICD-10-CM

## 2024-05-20 DIAGNOSIS — N40.1 BENIGN PROSTATIC HYPERPLASIA WITH WEAK URINARY STREAM: Chronic | ICD-10-CM

## 2024-05-20 DIAGNOSIS — Z86.16 HISTORY OF 2019 NOVEL CORONAVIRUS DISEASE (COVID-19): ICD-10-CM

## 2024-05-20 RX ORDER — CLINDAMYCIN PHOSPHATE 10 MG/G
GEL TOPICAL SEE ADMIN INSTRUCTIONS
Qty: 60 G | Refills: 5 | Status: SHIPPED | OUTPATIENT
Start: 2024-05-20

## 2024-05-21 LAB
25(OH)D3+25(OH)D2 SERPL-MCNC: 73.6 NG/ML (ref 30–100)
ALBUMIN SERPL-MCNC: 4.5 G/DL (ref 3.8–4.8)
ALBUMIN/GLOB SERPL: 1.9 {RATIO} (ref 1.2–2.2)
ALP SERPL-CCNC: 98 IU/L (ref 44–121)
ALT SERPL-CCNC: 19 IU/L (ref 0–44)
APPEARANCE UR: ABNORMAL
AST SERPL-CCNC: 34 IU/L (ref 0–40)
BILIRUB SERPL-MCNC: 0.4 MG/DL (ref 0–1.2)
BILIRUB UR QL STRIP: NEGATIVE
BUN SERPL-MCNC: 27 MG/DL (ref 8–27)
BUN/CREAT SERPL: 28 (ref 10–24)
CALCIUM SERPL-MCNC: 10.2 MG/DL (ref 8.6–10.2)
CHLORIDE SERPL-SCNC: 102 MMOL/L (ref 96–106)
CHOLEST SERPL-MCNC: 121 MG/DL (ref 100–199)
CO2 SERPL-SCNC: 25 MMOL/L (ref 20–29)
COLOR UR: YELLOW
CREAT SERPL-MCNC: 0.95 MG/DL (ref 0.76–1.27)
EGFRCR SERPLBLD CKD-EPI 2021: 85 ML/MIN/1.73
ERYTHROCYTE [DISTWIDTH] IN BLOOD BY AUTOMATED COUNT: 12.5 % (ref 11.6–15.4)
GLOBULIN SER CALC-MCNC: 2.4 G/DL (ref 1.5–4.5)
GLUCOSE SERPL-MCNC: 95 MG/DL (ref 70–99)
GLUCOSE UR QL STRIP: NEGATIVE
HBA1C MFR BLD: 5.8 % (ref 4.8–5.6)
HCT VFR BLD AUTO: 40.2 % (ref 37.5–51)
HDL SERPL-SCNC: 34.3 UMOL/L
HDLC SERPL-MCNC: 46 MG/DL
HGB BLD-MCNC: 13.3 G/DL (ref 13–17.7)
HGB UR QL STRIP: NEGATIVE
KETONES UR QL STRIP: ABNORMAL
LDL SERPL QN: 19.7 NM
LDL SERPL-SCNC: 986 NMOL/L
LDL SMALL SERPL-SCNC: 680 NMOL/L
LDLC SERPL CALC-MCNC: 59 MG/DL (ref 0–99)
LEUKOCYTE ESTERASE UR QL STRIP: NEGATIVE
MCH RBC QN AUTO: 32.4 PG (ref 26.6–33)
MCHC RBC AUTO-ENTMCNC: 33.1 G/DL (ref 31.5–35.7)
MCV RBC AUTO: 98 FL (ref 79–97)
MICRO URNS: ABNORMAL
NITRITE UR QL STRIP: NEGATIVE
PH UR STRIP: 5.5 [PH] (ref 5–7.5)
PLATELET # BLD AUTO: 157 X10E3/UL (ref 150–450)
POTASSIUM SERPL-SCNC: 4.7 MMOL/L (ref 3.5–5.2)
PROT SERPL-MCNC: 6.9 G/DL (ref 6–8.5)
PROT UR QL STRIP: ABNORMAL
PSA SERPL-MCNC: 0.3 NG/ML (ref 0–4)
RBC # BLD AUTO: 4.1 X10E6/UL (ref 4.14–5.8)
SARS-COV-2 AB SERPL QL IA: POSITIVE
SODIUM SERPL-SCNC: 140 MMOL/L (ref 134–144)
SP GR UR STRIP: 1.03 (ref 1–1.03)
T3FREE SERPL-MCNC: 3 PG/ML (ref 2–4.4)
T4 FREE SERPL-MCNC: 1.17 NG/DL (ref 0.82–1.77)
TRIGL SERPL-MCNC: 84 MG/DL (ref 0–149)
TSH SERPL DL<=0.005 MIU/L-ACNC: 0.64 UIU/ML (ref 0.45–4.5)
URATE SERPL-MCNC: 5.6 MG/DL (ref 3.8–8.4)
UROBILINOGEN UR STRIP-MCNC: 0.2 MG/DL (ref 0.2–1)
WBC # BLD AUTO: 7.8 X10E3/UL (ref 3.4–10.8)

## 2024-05-23 ENCOUNTER — OFFICE VISIT (OUTPATIENT)
Age: 74
End: 2024-05-23
Payer: MEDICARE

## 2024-05-23 ENCOUNTER — HOSPITAL ENCOUNTER (OUTPATIENT)
Facility: HOSPITAL | Age: 74
Discharge: HOME | End: 2024-05-23
Payer: MEDICARE

## 2024-05-23 VITALS
BODY MASS INDEX: 26.49 KG/M2 | SYSTOLIC BLOOD PRESSURE: 142 MMHG | HEIGHT: 65 IN | WEIGHT: 159 LBS | DIASTOLIC BLOOD PRESSURE: 68 MMHG

## 2024-05-23 DIAGNOSIS — Z86.79 HISTORY OF ABDOMINAL AORTIC ANEURYSM (AAA): Chronic | ICD-10-CM

## 2024-05-23 DIAGNOSIS — I65.23 BILATERAL CAROTID ARTERY OCCLUSION: ICD-10-CM

## 2024-05-23 DIAGNOSIS — I71.41 PARARENAL ABDOMINAL AORTIC ANEURYSM, WITHOUT RUPTURE: ICD-10-CM

## 2024-05-23 DIAGNOSIS — I71.40 ABDOMINAL AORTIC ANEURYSM (AAA) WITHOUT RUPTURE, UNSPECIFIED PART: ICD-10-CM

## 2024-05-23 DIAGNOSIS — I65.23 BILATERAL CAROTID ARTERY STENOSIS: Primary | Chronic | ICD-10-CM

## 2024-05-23 DIAGNOSIS — Z95.828 HISTORY OF ENDOVASCULAR STENT GRAFT FOR ABDOMINAL AORTIC ANEURYSM: ICD-10-CM

## 2024-05-23 DIAGNOSIS — I65.23 BILATERAL CAROTID ARTERY STENOSIS: ICD-10-CM

## 2024-05-23 LAB
ABDOMINAL AORTA AORTIC ANASTOMOSIS PSV: 82 CM/S
ABDOMINAL AORTA LEFT DIST ANASTOMOSIS PSV: 185 CM/S
ABDOMINAL AORTA LEFT LIMB GRAFT PSV: 102 CM/S
ABDOMINAL AORTA RIGHT DIST ANASTOMOSIS PSV: 426 CM/S
ABDOMINAL AORTA RIGHT LIMB GRAFT PSV: 90 CM/S
ABDOMINAL MID AORTA AP: 2.44 CM
ABDOMINAL MID AORTA TRANS: 2.38 CM
ABDOMINAL MID AORTA VEL: 80 CM/S
ABDOMINAL PROX AORTA AP: 1.42 CM
ABDOMINAL PROX AORTA VEL: 82 CM/S
BH CV VAS ABDOMINAL AO MID GRAFT BODY PSV: 80 CM/S
BH CV VAS ABDOMINAL AO RESIDUAL LUMEN AP MEASURE: 3.23 CM
BH CV VAS ABDOMINAL AO RESIDUAL LUMEN TRANS MEASURE: 3 CM
BH CV VAS MID AORTA RATIO: 17
BH CV XLRA MEAS LEFT CAROTID BULB EDV: -14.3 CM/SEC
BH CV XLRA MEAS LEFT CAROTID BULB PSV: -59.6 CM/SEC
BH CV XLRA MEAS LEFT DIST CCA EDV: -25.9 CM/SEC
BH CV XLRA MEAS LEFT DIST CCA PSV: -89 CM/SEC
BH CV XLRA MEAS LEFT DIST ICA EDV: -36.4 CM/SEC
BH CV XLRA MEAS LEFT DIST ICA PSV: -113.5 CM/SEC
BH CV XLRA MEAS LEFT ICA/CCA RATIO: 1.37
BH CV XLRA MEAS LEFT MID CCA EDV: -18.9 CM/SEC
BH CV XLRA MEAS LEFT MID CCA PSV: -84.1 CM/SEC
BH CV XLRA MEAS LEFT MID ICA EDV: -37.3 CM/SEC
BH CV XLRA MEAS LEFT MID ICA PSV: -117.8 CM/SEC
BH CV XLRA MEAS LEFT PROX CCA EDV: 23.2 CM/SEC
BH CV XLRA MEAS LEFT PROX CCA PSV: 99.7 CM/SEC
BH CV XLRA MEAS LEFT PROX ECA EDV: -14.1 CM/SEC
BH CV XLRA MEAS LEFT PROX ECA PSV: -113.7 CM/SEC
BH CV XLRA MEAS LEFT PROX ICA EDV: -32.9 CM/SEC
BH CV XLRA MEAS LEFT PROX ICA PSV: -121.5 CM/SEC
BH CV XLRA MEAS LEFT PROX SCLA PSV: 162.2 CM/SEC
BH CV XLRA MEAS LEFT VERTEBRAL A EDV: 18.2 CM/SEC
BH CV XLRA MEAS LEFT VERTEBRAL A PSV: 93.2 CM/SEC
BH CV XLRA MEAS RIGHT CAROTID BULB EDV: -56.2 CM/SEC
BH CV XLRA MEAS RIGHT CAROTID BULB PSV: -233.2 CM/SEC
BH CV XLRA MEAS RIGHT DIST CCA EDV: -16.1 CM/SEC
BH CV XLRA MEAS RIGHT DIST CCA PSV: -82 CM/SEC
BH CV XLRA MEAS RIGHT DIST ICA EDV: -23 CM/SEC
BH CV XLRA MEAS RIGHT DIST ICA PSV: -81.8 CM/SEC
BH CV XLRA MEAS RIGHT ICA/CCA RATIO: 4.74
BH CV XLRA MEAS RIGHT MID CCA EDV: 18 CM/SEC
BH CV XLRA MEAS RIGHT MID CCA PSV: 77 CM/SEC
BH CV XLRA MEAS RIGHT MID ICA EDV: -17.6 CM/SEC
BH CV XLRA MEAS RIGHT MID ICA PSV: -67.5 CM/SEC
BH CV XLRA MEAS RIGHT PROX CCA EDV: 11.2 CM/SEC
BH CV XLRA MEAS RIGHT PROX CCA PSV: 64.6 CM/SEC
BH CV XLRA MEAS RIGHT PROX ECA EDV: -10.2 CM/SEC
BH CV XLRA MEAS RIGHT PROX ECA PSV: -106.6 CM/SEC
BH CV XLRA MEAS RIGHT PROX ICA EDV: -127.3 CM/SEC
BH CV XLRA MEAS RIGHT PROX ICA PSV: -388.5 CM/SEC
BH CV XLRA MEAS RIGHT PROX SCLA PSV: 185 CM/SEC
BH CV XLRA MEAS RIGHT VERTEBRAL A EDV: -14.3 CM/SEC
BH CV XLRA MEAS RIGHT VERTEBRAL A PSV: -53.8 CM/SEC
LEFT ARM BP: NORMAL MMHG
RIGHT ARM BP: NORMAL MMHG

## 2024-05-23 PROCEDURE — 93978 VASCULAR STUDY: CPT

## 2024-05-23 PROCEDURE — 93880 EXTRACRANIAL BILAT STUDY: CPT

## 2024-05-23 NOTE — PROGRESS NOTES
Patient Name: Vernon Willis    MRN: [id number] Encounter Date: 05/23/2024      Consulting Service: Vascular Surgery    Referring Provider: Ryan Hutchinson MD       CHIEF COMPLAINT:  Chief Complaint   Patient presents with    Aortic Aneurysm    Carotid Artery Disease       Subjective    HPI: Vernon Willis is a 73 y.o. male is being seen for evaluation/management of known carotid disease and aortic stent graft repair from Dr Escobar.  The aorta looks great at 3.23 cm excluded and having no change of the stent graft structure.  Overall very pleased with this portion of his exam and repair.  The question comes back to his carotids.  Over time we have had a discrepancy between the ultrasound and a CT scan done at [facility].  Unfortunately with this changing systems we do not have any of the records of his recent CT scans from [facility].  They are not in his MyChart either.  This is a little bit confusing.  At this point we will try to get the results from [facility] and confirm that things were stable last year.  Having a disc and getting it [facility] is probably our best option.    PAST MEDICAL HISTORY:   Past Medical History:   Diagnosis Date    Abdominal aortic aneurysm without rupture 04/15/2021    Allergic rhinitis 08/13/2019    Benign prostatic hyperplasia with weak urinary stream 08/13/2019    Bilateral carotid artery stenosis, 7/1/2021--60-70% right; 50-60% left. 11/22/2021 July 1, 2021--carotid Doppler study reveals 60-70% right ICA stenosis; 50-60% left ICA stenosis.    CAD (coronary artery disease)     Carotid bruit 05/20/2021    Chronic allergic conjunctivitis 08/13/2019    Cigarette nicotine dependence  08/13/2019    Coronary angioplasty status     Former cigarette smoker 11/16/2021    Gastroesophageal reflux disease with esophagitis 08/13/2019    Gastroesophageal reflux disease with esophagitis without hemorrhage 08/13/2019    Genital warts 08/13/2019    Gout     Heart palpitations 11/25/2020     History of abdominal aortic aneurysm (AAA), 6/23/2017--endovascular AAA repair with stent. 08/13/2019 August 1, 2017--CTA of the abdomen: There has been endovascular repair of the abdominal aortic aneurysm with stent extending from just below the origin of the left renal vein into both common iliac arteries. No evidence for endoleak on arterial phase imaging. The excluded aneurysmal sac measures 4.8 cm in maximal diameter, previously 5.0 cm. Aortoiliac atherosclerosis with moderate narrowing at th    History of cardiac catheterization 08/13/2019 December 2, 2015--cardiac catheterization.  INDICATION(S): This is a 64-year-old male with a history of coronary artery disease status post coronary artery bypass grafting. He presented to Dr. Clarke with symptoms consistent with his prior angina. He underwent a nuclear stress test that showed a mid-anterior wall myocardial infarction with a small amount of ischemia in the ventricular apex. He was s    History of colon polyps 08/13/2019 February 2016--colonoscopy with polypectomy.  Pathology not known.  2010--colonoscopy revealed a serrated adenoma.    History of gout 08/13/2019    History of posterior vitreous detachment of right eye, 10/15/2016--repair. 08/13/2019 October 5, 2016--vitrectomy, membrane peel, panretinal endophotocoagulation laser, right eye for preretinal membrane, vitreous hemorrhage, and posterior vitreous detachment of right eye.         History of PTCA, 7/15/2013--drug-eluting stent proximal and origin of LM. 08/13/2019    July 15, 2013--successful drug-eluting stent PCI to the proximal and origin of the left main using vascular ultrasound guidance.    History of stroke     History of stroke, questionable. 08/13/2019           Hx of CABG, 2004--LIMA to LAD; RADHA to RCA; SVG to obtuse marginal; SVG to diagonal. 08/13/2019 December 2, 2015--cardiac catheterization: LIMA to LAD (patent); RADHA to RCA (occluded 100%); SVG to obtuse marginal  (occluded 100%); SVG to diagonal (occluded 100%).  2004--four-vessel CABG.  LIMA to LAD; RADHA to RCA; SVG to obtuse marginal; SVG to diagonal.    Hyperlipidemia 08/13/2019    Impaired fasting glucose 05/21/2020    Macrocytosis 08/13/2019 July 25, 2019--MCV elevated at 102.2.  Vitamin B12 level was above normal at 1208.  Methylmalonic acid was not performed.    Multiple pulmonary nodules, 3/17/2021--stable 3.1 millimeter left upper lobe nodule.  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  Yearly screening recommen 08/13/2019 March 17, 2021--CT scan of the chest reveals a solitary stable left upper lobe 3.5 mm nodule.  Persistent reticulonodular interstitial disease in the right middle lobe likely to be RB-ILD in this patient with smoking history and not neoplastic.  Patient is to return to annual lung screening.  Thus, Fleischner's criteria not applied.  January 29, 2019--CT scan of the chest without contrast performe    Multiple pulmonary nodules, 5/12/2022--no change.  3/17/2021--stable 3.1 millimeter left upper lobe nodule.  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 08/13/2019    May 12, 2022--CT scan of the chest reveals mild reticular scarring in the right middle lobe that is unchanged.  No new infiltrates or masses are seen.  Underlying emphysema noted.  There is a left apical nodule measuring 3 to 4 mm which is unchanged.  Recommend yearly screening.     March 17, 2021--CT scan of the chest reveals a solitary stable left upper lobe 3.5 mm nodule.  Persistent reticulono    Occlusive coronary artery disease, 12/2/2015--cardiac cath: LIMA to LAD (patent); RADHA to RCA (occluded 100%); SVG to obtuse marginal (occluded 100%); SVG to diagonal (occluded 100%).   08/13/2019 December 2, 2015--cardiac catheterization: LIMA to LAD (patent); RADHA to RCA (occluded 100%); SVG to obtuse marginal (occluded 100%); SVG to diagonal  (occluded 100%).  July 15, 2013--successful drug-eluting stent PCI to the proximal and origin of the left main using vascular ultrasound guidance.  2004--four-vessel CABG.  LIMA to LAD; RADHA to RCA; SVG to obtuse marginal; SVG to diagonal.   HPI: Fra    Other nonspecific abnormal finding of lung field     Overweight (BMI 25.0-29.9) 05/02/2022    Panlobular emphysema     Personal history of colonic polyps     Personal history of other diseases of the musculoskeletal system and connective tissue     Presence of aortocoronary bypass graft     Proctalgia fugax 08/13/2019    Pulmonary emphysema 08/13/2019 January 29, 2019--CT scan of the chest without contrast performed for abnormal CT scan reveals minimal change in clustered nodularity and tree-in-bud nodularity involving dominantly the upper lobes and right middle lobe, likely postinfectious or postinflammatory.  A few new areas of peripheral mucus plugging noted at the right lower lobe.  No new or enlarging pulmonary nodules.  Return to annual s    Vitamin D deficiency 08/13/2019      PAST SURGICAL HISTORY:   Past Surgical History:   Procedure Laterality Date    ABDOMINAL AORTIC ANEURYSM REPAIR W/ ENDOLUMINAL GRAFT  06/23/2017 June 23, 2017--endovascular repair of AAA.    CARDIAC CATHETERIZATION  12/02/2015 December 2, 2015--cardiac catheterization.  See past medical history for details.    CARDIAC CATHETERIZATION  08/2019    CAROTID STENT  08/2019    CATARACT EXTRACTION, BILATERAL      COLONOSCOPY  2010 2010--colonoscopy revealed a serrated adenoma.    COLONOSCOPY W/ POLYPECTOMY  02/2016 February 2016--colonoscopy with polypectomy.  Pathology not known.    CORONARY ANGIOPLASTY WITH STENT PLACEMENT  07/15/2013    July 15, 2013--successful drug-eluting stent PCI to the proximal and origin of the left main using vascular ultrasound guidance.    CORONARY ARTERY BYPASS GRAFT  2004 2004--four-vessel CABG.  LIMA to LAD; RADHA to RCA; SVG to obtuse marginal;  SVG to diagonal.    PARS PLANA VITRECTOMY W/ ENDOPHOTOCOAGULATION  10/05/2016    2016--vitrectomy, membrane peel, panretinal endophotocoagulation laser, right eye for preretinal membrane, vitreous hemorrhage, and posterior vitreous detachment of right eye.    UPPER GASTROINTESTINAL ENDOSCOPY  2016--EGD reportedly revealed esophagitis and gastritis.    VITRECTOMY        FAMILY HISTORY:   Family History   Problem Relation Age of Onset    Diabetes type II Mother     Coronary artery disease Father     Diabetes type II Father     Kidney disease Father     Hypertension Father     Heart disease Father     Crohn's disease Sister     Coronary artery disease Brother         Brother  of a myocardial infarction at age 53    Stroke Other     Diabetes Other     Kidney failure Other       SOCIAL HISTORY:   Social History     Tobacco Use    Smoking status: Former     Current packs/day: 0.00     Average packs/day: 0.5 packs/day for 50.0 years (25.0 ttl pk-yrs)     Types: Cigarettes     Start date:      Quit date: 2020     Years since quittin.3    Smokeless tobacco: Never   Vaping Use    Vaping status: Never Used   Substance Use Topics    Alcohol use: No    Drug use: No      MEDICATIONS:   Current Outpatient Medications on File Prior to Visit   Medication Sig Dispense Refill    ascorbic acid (VITAMIN C) 1000 MG tablet       aspirin 81 MG tablet Take 1 tablet by mouth Daily.      atorvastatin (LIPITOR) 80 MG tablet Take 1 tablet by mouth Daily.      chlorhexidine (PERIDEX) 0.12 % solution USE 15 ML TO THE MOUTH OR THROAT 2 TIMES A DAY . SPIT AFTER RINSING (SUBSTITUTED FOR PERIOGARD) 2838 mL 2    Cholecalciferol (VITAMIN D3) 5000 units capsule capsule Take  by mouth.      clindamycin 1 % gel APPLY TO AFFECTED AREA(S) TWO TIMES A DAY AS DIRECTED 60 g 5    clopidogrel (PLAVIX) 75 MG tablet Take 1 p.o. daily for blood thinner 30 tablet     Cyanocobalamin (B-12) 1000 MCG tablet Take 1  "tablet by mouth Daily.      desonide (DESOWEN) 0.05 % cream Apply  topically to the appropriate area as directed 2 (Two) Times a Day. 60 g 1    diazePAM (VALIUM) 2 MG tablet Take 1 tablet by mouth 2 (Two) Times a Day As Needed for Anxiety. 60 tablet 3    erythromycin (ROMYCIN) 5 MG/GM ophthalmic ointment PUT ON QTIP AND APPLY TO EYELID AT BEDTIME      esomeprazole (nexIUM) 40 MG capsule TAKE 1 CAPSULE BY MOUTH DAILY BEFORE FIRST MEAL FOR REFLUX 90 capsule 2    ezetimibe (ZETIA) 10 MG tablet TAKE 1 TABLET EVERY DAY FOR HIGH CHOLESTEROL 90 tablet 3    fluorometholone (FML) 0.1 % ophthalmic suspension INSTILL 1 DROP INTO EACH EYE TWICE DAILY  5    fluticasone (FLONASE) 50 MCG/ACT nasal spray USE 2 SPRAYS IN EACH NOSTRIL ONE TIME DAILY AS NEEDED 16 g 6    hydrocortisone 2.5 % cream APPLY TO AFFECTED AREA(S) TWO TIMES A DAY 30 g 2    metoprolol succinate XL (TOPROL-XL) 50 MG 24 hr tablet       Multiple Vitamins-Minerals (ZINC PO) Take  by mouth.      podofilox (CONDYLOX) 0.5 % external solution Apply  topically to the appropriate area as directed.      rosuvastatin (Crestor) 40 MG tablet Take 1 p.o. daily for high cholesterol as directed      Sodium Fluoride (PreviDent 5000 Booster Plus) 1.1 % paste BRUSH TEETH TWO TIMES A DAY AS DIRECTED 100 mL 3    tamsulosin (FLOMAX) 0.4 MG capsule 24 hr capsule Take 1 p.o. twice daily for prostate 60 capsule 6    Vitamin A 2400 MCG (8000 UT) capsule Take  by mouth Daily.      VITAMIN E PO Take  by mouth.      Zinc 100 MG tablet       metoprolol succinate XL (TOPROL-XL) 25 MG 24 hr tablet TAKE 1 TABLET BY MOUTH DAILY ALONG WITH THE 50MG TABLET AS NEEDED FOR PALPITATIONS.       No current facility-administered medications on file prior to visit.       ALLERGIES: Codeine, Msg [monosodium glutamate], and Penicillins       Objective   Vitals:    05/23/24 1002   BP: 142/68   Weight: 72.1 kg (159 lb)   Height: 165.1 cm (65\")     Body mass index is 26.46 kg/m².          PHYSICAL EXAM: "   Physical Exam  Constitutional:       Appearance: Normal appearance. He is normal weight.   HENT:      Head: Normocephalic and atraumatic.      Nose: Nose normal.   Eyes:      Extraocular Movements: Extraocular movements intact.      Pupils: Pupils are equal, round, and reactive to light.   Cardiovascular:      Rate and Rhythm: Normal rate.      Pulses:           Carotid pulses are 2+ on the right side and 2+ on the left side.       Radial pulses are 2+ on the right side and 2+ on the left side.        Femoral pulses are 2+ on the right side and 2+ on the left side.       Popliteal pulses are 2+ on the right side and 2+ on the left side.        Dorsalis pedis pulses are 2+ on the right side and 2+ on the left side.        Posterior tibial pulses are 2+ on the right side and 2+ on the left side.      Heart sounds: Normal heart sounds.   Pulmonary:      Effort: Pulmonary effort is normal.      Breath sounds: Normal breath sounds.   Abdominal:      General: Abdomen is flat. Bowel sounds are normal.      Palpations: Abdomen is soft.   Musculoskeletal:         General: Normal range of motion.      Cervical back: Normal range of motion and neck supple.      Right lower leg: No edema.      Left lower leg: No edema.   Skin:     General: Skin is warm and dry.   Neurological:      General: No focal deficit present.      Mental Status: He is alert and oriented to person, place, and time. Mental status is at baseline.   Psychiatric:         Mood and Affect: Mood normal.         Thought Content: Thought content normal.          Result Review   LABS:      Results Review:       I reviewed the patient's new clinical results.    The following radiologic or non-invasive studies have been reviewed by me: Carotid and aortic stent graft scans reviewed with images reviewed                  ASSESSMENT/PLAN:   Diagnoses and all orders for this visit:    1. Bilateral carotid artery stenosis, 7/1/2021--60-70% right; 50-60% left.  (Primary)  -     CT Angiogram Head; Future  -     CT Angiogram Neck; Future  -     Duplex Carotid Ultrasound CAR; Future    2. Bilateral carotid artery stenosis  -     CT Angiogram Head; Future  -     CT Angiogram Neck; Future  -     Duplex Carotid Ultrasound CAR; Future    3. Abdominal aortic aneurysm (AAA) without rupture, unspecified part  -     Duplex Aorta IVC Iliac Graft Complete CAR; Future    4. History of abdominal aortic aneurysm (AAA), 6/23/2017--endovascular AAA repair with stent.    5. Pararenal abdominal aortic aneurysm, without rupture  -     Duplex Aorta IVC Iliac Graft Complete CAR; Future       73 y.o. male with known carotid and aortic aneurysm.  The aneurysm has been fixed with aortic stent graft and looks great.  He will continue yearly follow-up on that.  The carotid disease is a little confusing because CT scans have never confirmed the degree of stenosis that we see on ultrasound.  That stenosis is back up to 80% on our duplex today but the patient is asymptomatic and doing overall well.  He and I are reluctant to brace back into a CAT scan until we have gotten a chance to review the actual images from [facility] last year.  He is going to do his best to get a disc and we will contact [facility] as well and see if they will [place] access the disc and the report as it seems to be missing from his epic MyChart as well as his current epic chart.  Will set up a telehealth discussion once we have everything together.  Will also get him set up for 1 year for his scans.    Addendum: Patient report of his CT angiogram of the head and neck from [facility] on 5/26/2023 has been received and reviewed and shows him to have a 60 to 70% approximate lesion on the right proximal internal carotid artery.  Unfortunately without being able to see the films I have no idea if that is an accurate reading.  Will await the actual discs.  With this repeat review increased time of care      I discussed the plan with the  patient who is agreeable to the plan of care at this point. Thank you for this consult.   Follow Up  Return in about 6 months (around 11/23/2024).    Kaden Galaviz MD   05/23/24

## 2024-05-30 ENCOUNTER — TELEPHONE (OUTPATIENT)
Age: 74
End: 2024-05-30
Payer: MEDICARE

## 2024-05-30 NOTE — TELEPHONE ENCOUNTER
Pt wants to know if we received the medical records from [facility] that Dr. Galaviz wants yet. Isela called [facility] imaging and they informed her that they will mail the disc. The patient understands that when we receive the disc, Dr. Galaviz will view it.

## 2024-06-06 ENCOUNTER — DOCUMENTATION (OUTPATIENT)
Age: 74
End: 2024-06-06
Payer: MEDICARE

## 2024-06-06 NOTE — PROGRESS NOTES
I have reviewed Western posterior CTA from over at [facility].  I agree that the right side is 60 to 70% in nature.  His laminar plaquing and calcium but the flow channel is actually well-preserved.  If anything little more concerned around the left side.  Both of these can be followed with duplex.  We will try to set up a time to discuss.  Via telehealth

## 2024-07-01 NOTE — TELEPHONE ENCOUNTER
Caller: [facility] [id number] - [place], [place] - [address] AT [facility] [phone] [facility] [phone] FX    Relationship: Pharmacy    Best call back number: [phone]     Requested Prescriptions:   Requested Prescriptions     Pending Prescriptions Disp Refills    hydrocortisone 2.5 % cream 30 g 2     Si (Two) Times a Day. to affected area(s)        Pharmacy where request should be sent: [facility] [id number] [place], [place] - [address]  AT [facility] [phone] [facility] [phone] FX     Last office visit with prescribing clinician: 2024   Last telemedicine visit with prescribing clinician: Visit date not found   Next office visit with prescribing clinician: Visit date not found     Would you like a call back once the refill request has been completed: [] Yes [x] No    If the office needs to give you a call back, can they leave a voicemail: [] Yes [x] No    Arcadio Tucker Rep   24 14:20 EDT

## 2024-08-14 DIAGNOSIS — R00.2 HEART PALPITATIONS: ICD-10-CM

## 2024-08-14 DIAGNOSIS — K59.4 PROCTALGIA FUGAX: ICD-10-CM

## 2024-08-15 RX ORDER — SODIUM FLUORIDE 6 MG/ML
PASTE, DENTIFRICE DENTAL
Qty: 100 ML | Refills: 3 | Status: SHIPPED | OUTPATIENT
Start: 2024-08-15

## 2024-08-15 RX ORDER — DIAZEPAM 2 MG/1
2 TABLET ORAL 2 TIMES DAILY PRN
Qty: 60 TABLET | Refills: 3 | Status: SHIPPED | OUTPATIENT
Start: 2024-08-15

## 2024-08-19 NOTE — TELEPHONE ENCOUNTER
Caller: [facility] [id number] [place], [place] - [address] AT [facility] [phone] [facility] [phone] FX    Relationship: Pharmacy    Best call back number: [phone]     Requested Prescriptions:   Requested Prescriptions     Pending Prescriptions Disp Refills    hydrocortisone 2.5 % cream 30 g 2     Sig: Apply  topically to the appropriate area as directed 2 (Two) Times a Day. to affected area(s)        Pharmacy where request should be sent: [facility] [id number] [place], [place] - [address]  AT [facility] [phone] [facility] [phone] FX     Last office visit with prescribing clinician: 1/23/2024   Last telemedicine visit with prescribing clinician: Visit date not found   Next office visit with prescribing clinician: Visit date not found     Additional details provided by patient: PATIENTS PHARMACY STATES HE IS OUT OF THIS MEDICATION    Does the patient have less than a 3 day supply:  [x] Yes  [] No    Arcadio Hua Rep   08/19/24 09:33 EDT

## 2024-08-22 ENCOUNTER — TELEPHONE (OUTPATIENT)
Dept: INTERNAL MEDICINE | Facility: CLINIC | Age: 74
End: 2024-08-22

## 2024-08-22 DIAGNOSIS — K59.4 PROCTALGIA FUGAX: Primary | Chronic | ICD-10-CM

## 2024-09-22 DIAGNOSIS — K21.00 GASTROESOPHAGEAL REFLUX DISEASE WITH ESOPHAGITIS: ICD-10-CM

## 2024-09-25 RX ORDER — ESOMEPRAZOLE MAGNESIUM 40 MG/1
CAPSULE, DELAYED RELEASE ORAL
Qty: 90 CAPSULE | Refills: 2 | Status: SHIPPED | OUTPATIENT
Start: 2024-09-25

## 2024-11-30 DIAGNOSIS — J30.1 SEASONAL ALLERGIC RHINITIS DUE TO POLLEN: Chronic | ICD-10-CM

## 2024-12-02 RX ORDER — FLUTICASONE PROPIONATE 50 MCG
SPRAY, SUSPENSION (ML) NASAL
Qty: 16 G | Refills: 6 | Status: SHIPPED | OUTPATIENT
Start: 2024-12-02

## 2024-12-23 RX ORDER — SODIUM FLUORIDE 6 MG/ML
PASTE, DENTIFRICE DENTAL
Qty: 100 ML | Refills: 3 | Status: SHIPPED | OUTPATIENT
Start: 2024-12-23

## 2024-12-26 RX ORDER — CLINDAMYCIN PHOSPHATE 10 MG/G
GEL TOPICAL SEE ADMIN INSTRUCTIONS
Qty: 60 G | Refills: 5 | Status: SHIPPED | OUTPATIENT
Start: 2024-12-26

## 2025-01-03 DIAGNOSIS — R00.2 HEART PALPITATIONS: ICD-10-CM

## 2025-01-03 DIAGNOSIS — K59.4 PROCTALGIA FUGAX: ICD-10-CM

## 2025-01-03 RX ORDER — DIAZEPAM 2 MG/1
2 TABLET ORAL 2 TIMES DAILY PRN
Qty: 60 TABLET | Refills: 3 | Status: SHIPPED | OUTPATIENT
Start: 2025-01-03

## 2025-01-28 ENCOUNTER — TELEPHONE (OUTPATIENT)
Dept: INTERNAL MEDICINE | Facility: CLINIC | Age: 75
End: 2025-01-28

## 2025-01-28 NOTE — TELEPHONE ENCOUNTER
Caller: Vernon Willis    Relationship: Self    Best call back number:     [phone] (Mobile)       What orders are you requesting (i.e. lab or imaging): LUNG CANCER SCREENING / HAS NODULES AND SCARRING       In what timeframe would the patient need to come in:     Where will you receive your lab/imaging services: [facility] [address]       Additional notes:   ACTIVE IN "[employer]"

## 2025-01-29 DIAGNOSIS — R91.1 APICAL LUNG NODULE: Primary | ICD-10-CM

## 2025-03-07 ENCOUNTER — TELEPHONE (OUTPATIENT)
Dept: INTERNAL MEDICINE | Facility: CLINIC | Age: 75
End: 2025-03-07
Payer: MEDICARE

## 2025-03-07 NOTE — TELEPHONE ENCOUNTER
----- Message from Ryan Hutchinson sent at 3/7/2025  8:07 AM EST -----  Call patient with abnormal results.  Tell patient that there are some new nodules on his chest CT and they want to repeat that in 6 months.  Make sure he understands that this needs to be done.

## 2025-03-10 ENCOUNTER — DOCUMENTATION (OUTPATIENT)
Age: 75
End: 2025-03-10
Payer: MEDICARE

## 2025-03-10 DIAGNOSIS — I71.43 INFRARENAL ABDOMINAL AORTIC ANEURYSM (AAA) WITHOUT RUPTURE: Primary | ICD-10-CM

## 2025-03-10 NOTE — PROGRESS NOTES
The patient is apparently called and refused to have any more ultrasounds of anything.  He wants to have CAT scans done at [facility].  Point in time he is having a low-dose CAT scan of the chest we will see if we can add a CTA of the abdomen and pelvis for his discomfort.  Depending on what we find we may be able to transition to CAT scans without contrast but we certainly want we will want to limit the number of studies.  I would not pursue CTA of the head and neck at this time and he does not want to have his carotid scan and we will forego those and discuss it when he is in.

## 2025-03-11 RX ORDER — CHLORHEXIDINE GLUCONATE ORAL RINSE 1.2 MG/ML
15 SOLUTION DENTAL 2 TIMES DAILY
Qty: 2838 ML | Refills: 2 | Status: SHIPPED | OUTPATIENT
Start: 2025-03-11

## 2025-03-11 RX ORDER — PODOFILOX 5 MG/ML
SOLUTION TOPICAL 2 TIMES DAILY
Qty: 3.5 ML | Refills: 0 | Status: SHIPPED | OUTPATIENT
Start: 2025-03-11

## 2025-04-07 DIAGNOSIS — K21.00 GASTROESOPHAGEAL REFLUX DISEASE WITH ESOPHAGITIS: ICD-10-CM

## 2025-04-07 RX ORDER — PODOFILOX 5 MG/ML
SOLUTION TOPICAL
Qty: 3.5 ML | Refills: 1 | Status: SHIPPED | OUTPATIENT
Start: 2025-04-07

## 2025-04-07 RX ORDER — ESOMEPRAZOLE MAGNESIUM 40 MG/1
CAPSULE, DELAYED RELEASE ORAL
Qty: 90 CAPSULE | Refills: 2 | Status: SHIPPED | OUTPATIENT
Start: 2025-04-07

## 2025-04-08 DIAGNOSIS — E78.2 MIXED HYPERLIPIDEMIA: Chronic | ICD-10-CM

## 2025-04-08 RX ORDER — EZETIMIBE 10 MG/1
TABLET ORAL
Qty: 90 TABLET | Refills: 3 | Status: SHIPPED | OUTPATIENT
Start: 2025-04-08

## 2025-04-17 DIAGNOSIS — Z51.81 THERAPEUTIC DRUG MONITORING: ICD-10-CM

## 2025-04-17 DIAGNOSIS — E55.9 VITAMIN D DEFICIENCY: Chronic | ICD-10-CM

## 2025-04-17 DIAGNOSIS — Z00.00 MEDICARE ANNUAL WELLNESS VISIT, SUBSEQUENT: ICD-10-CM

## 2025-04-17 DIAGNOSIS — E78.2 MIXED HYPERLIPIDEMIA: Primary | Chronic | ICD-10-CM

## 2025-04-17 DIAGNOSIS — Z87.39 HISTORY OF GOUT: ICD-10-CM

## 2025-04-17 DIAGNOSIS — R73.01 IMPAIRED FASTING GLUCOSE: Chronic | ICD-10-CM

## 2025-04-17 DIAGNOSIS — N40.1 BENIGN PROSTATIC HYPERPLASIA WITH WEAK URINARY STREAM: Chronic | ICD-10-CM

## 2025-04-17 DIAGNOSIS — R39.12 BENIGN PROSTATIC HYPERPLASIA WITH WEAK URINARY STREAM: Chronic | ICD-10-CM

## 2025-04-21 ENCOUNTER — TELEPHONE (OUTPATIENT)
Dept: INTERNAL MEDICINE | Facility: CLINIC | Age: 75
End: 2025-04-21

## 2025-04-21 DIAGNOSIS — Z87.39 HISTORY OF GOUT: ICD-10-CM

## 2025-04-21 DIAGNOSIS — R73.01 IMPAIRED FASTING GLUCOSE: Chronic | ICD-10-CM

## 2025-04-21 DIAGNOSIS — R39.12 BENIGN PROSTATIC HYPERPLASIA WITH WEAK URINARY STREAM: Chronic | ICD-10-CM

## 2025-04-21 DIAGNOSIS — Z00.00 MEDICARE ANNUAL WELLNESS VISIT, SUBSEQUENT: ICD-10-CM

## 2025-04-21 DIAGNOSIS — Z51.81 THERAPEUTIC DRUG MONITORING: ICD-10-CM

## 2025-04-21 DIAGNOSIS — E78.2 MIXED HYPERLIPIDEMIA: Chronic | ICD-10-CM

## 2025-04-21 DIAGNOSIS — E55.9 VITAMIN D DEFICIENCY: Chronic | ICD-10-CM

## 2025-04-21 DIAGNOSIS — N40.1 BENIGN PROSTATIC HYPERPLASIA WITH WEAK URINARY STREAM: Chronic | ICD-10-CM

## 2025-04-21 NOTE — TELEPHONE ENCOUNTER
Caller: Vernon Willis    Relationship: Self    Best call back number:[phone] LEAVE MESSAGE IF NO ANSWER     What orders are you requesting (i.e. lab or imaging): LABS TO BE SENT     In what timeframe would the patient need to come in: PATIENT IS GOING TOMORROW TO HAVE LABS DRAWN    Where will you receive your lab/imaging services: [address] PHONE [phone]    Additional notes: PATIENT REQUESTING A CALL OR TEXT TO LET HIM KNOW THEY HAVE BEEN SENT OVER TODAY.  PATIENT GOING EARLY TOMORROW TO HAVE LABS DRAWN AT ABOVE LOCATION.

## 2025-04-21 NOTE — TELEPHONE ENCOUNTER
Caller: Vernon Willis    Relationship: Self    Best call back number: [phone]     What was the call regarding: PATIENT STATES THAT HE CALLED THE [facility] ON [address], BUT THEY DO NOT HAVE ACCESS TO THE [facility] SYSTEM, AND WILL NEED TO HAVE ORDERS FAXED. REQUESTS CALLBACK TO DISCUSS ISSUE FURTHER.

## 2025-04-23 DIAGNOSIS — K59.4 PROCTALGIA FUGAX: Chronic | ICD-10-CM

## 2025-04-23 LAB
25(OH)D3+25(OH)D2 SERPL-MCNC: 55.2 NG/ML (ref 30–100)
ALBUMIN SERPL-MCNC: 4.6 G/DL (ref 3.8–4.8)
ALP SERPL-CCNC: 91 IU/L (ref 44–121)
ALT SERPL-CCNC: 23 IU/L (ref 0–44)
APPEARANCE UR: CLEAR
AST SERPL-CCNC: 40 IU/L (ref 0–40)
BACTERIA #/AREA URNS HPF: NORMAL /[HPF]
BASOPHILS # BLD AUTO: 0 X10E3/UL (ref 0–0.2)
BASOPHILS NFR BLD AUTO: 1 %
BILIRUB SERPL-MCNC: 0.4 MG/DL (ref 0–1.2)
BILIRUB UR QL STRIP: NEGATIVE
BUN SERPL-MCNC: 18 MG/DL (ref 8–27)
BUN/CREAT SERPL: 19 (ref 10–24)
CALCIUM SERPL-MCNC: 9.4 MG/DL (ref 8.6–10.2)
CASTS URNS QL MICRO: NORMAL /LPF
CHLORIDE SERPL-SCNC: 101 MMOL/L (ref 96–106)
CHOLEST SERPL-MCNC: 110 MG/DL (ref 100–199)
CO2 SERPL-SCNC: 22 MMOL/L (ref 20–29)
COLOR UR: YELLOW
CREAT SERPL-MCNC: 0.94 MG/DL (ref 0.76–1.27)
EGFRCR SERPLBLD CKD-EPI 2021: 85 ML/MIN/1.73
EOSINOPHIL # BLD AUTO: 0.2 X10E3/UL (ref 0–0.4)
EOSINOPHIL NFR BLD AUTO: 2 %
EPI CELLS #/AREA URNS HPF: NORMAL /HPF (ref 0–10)
ERYTHROCYTE [DISTWIDTH] IN BLOOD BY AUTOMATED COUNT: 12 % (ref 11.6–15.4)
GLOBULIN SER CALC-MCNC: 2.1 G/DL (ref 1.5–4.5)
GLUCOSE SERPL-MCNC: 99 MG/DL (ref 70–99)
GLUCOSE UR QL STRIP: NEGATIVE
HBA1C MFR BLD: 6 % (ref 4.8–5.6)
HCT VFR BLD AUTO: 39.2 % (ref 37.5–51)
HDL SERPL-SCNC: 33.6 UMOL/L
HDLC SERPL-MCNC: 54 MG/DL
HGB BLD-MCNC: 13.1 G/DL (ref 13–17.7)
HGB UR QL STRIP: ABNORMAL
IMM GRANULOCYTES # BLD AUTO: 0 X10E3/UL (ref 0–0.1)
IMM GRANULOCYTES NFR BLD AUTO: 0 %
KETONES UR QL STRIP: NEGATIVE
LDL SERPL QN: 20.8 NM
LDL SERPL-SCNC: 383 NMOL/L
LDL SMALL SERPL-SCNC: 154 NMOL/L
LDLC SERPL CALC-MCNC: 46 MG/DL (ref 0–99)
LEUKOCYTE ESTERASE UR QL STRIP: NEGATIVE
LYMPHOCYTES # BLD AUTO: 1.1 X10E3/UL (ref 0.7–3.1)
LYMPHOCYTES NFR BLD AUTO: 15 %
MCH RBC QN AUTO: 32.8 PG (ref 26.6–33)
MCHC RBC AUTO-ENTMCNC: 33.4 G/DL (ref 31.5–35.7)
MCV RBC AUTO: 98 FL (ref 79–97)
MICRO URNS: ABNORMAL
MONOCYTES # BLD AUTO: 0.6 X10E3/UL (ref 0.1–0.9)
MONOCYTES NFR BLD AUTO: 8 %
NEUTROPHILS # BLD AUTO: 5.8 X10E3/UL (ref 1.4–7)
NEUTROPHILS NFR BLD AUTO: 74 %
NITRITE UR QL STRIP: NEGATIVE
PH UR STRIP: 6 [PH] (ref 5–7.5)
PLATELET # BLD AUTO: 174 X10E3/UL (ref 150–450)
POTASSIUM SERPL-SCNC: 4.6 MMOL/L (ref 3.5–5.2)
PROT SERPL-MCNC: 6.7 G/DL (ref 6–8.5)
PROT UR QL STRIP: NEGATIVE
PSA SERPL-MCNC: 0.3 NG/ML (ref 0–4)
RBC # BLD AUTO: 3.99 X10E6/UL (ref 4.14–5.8)
RBC #/AREA URNS HPF: NORMAL /HPF (ref 0–2)
SODIUM SERPL-SCNC: 140 MMOL/L (ref 134–144)
SP GR UR STRIP: 1.01 (ref 1–1.03)
T3FREE SERPL-MCNC: 3.4 PG/ML (ref 2–4.4)
T4 FREE SERPL-MCNC: 1.16 NG/DL (ref 0.82–1.77)
TRIGL SERPL-MCNC: 36 MG/DL (ref 0–149)
TSH SERPL DL<=0.005 MIU/L-ACNC: 1.53 UIU/ML (ref 0.45–4.5)
URATE SERPL-MCNC: 5.9 MG/DL (ref 3.8–8.4)
UROBILINOGEN UR STRIP-MCNC: 0.2 MG/DL (ref 0.2–1)
WBC # BLD AUTO: 7.7 X10E3/UL (ref 3.4–10.8)
WBC #/AREA URNS HPF: NORMAL /HPF (ref 0–5)

## 2025-04-23 RX ORDER — HYDROCORTISONE 25 MG/G
CREAM TOPICAL
Qty: 30 G | Refills: 3 | Status: SHIPPED | OUTPATIENT
Start: 2025-04-23

## 2025-04-28 ENCOUNTER — OFFICE VISIT (OUTPATIENT)
Dept: INTERNAL MEDICINE | Facility: CLINIC | Age: 75
End: 2025-04-28
Payer: MEDICARE

## 2025-04-28 VITALS
BODY MASS INDEX: 27.99 KG/M2 | HEART RATE: 63 BPM | RESPIRATION RATE: 16 BRPM | DIASTOLIC BLOOD PRESSURE: 68 MMHG | WEIGHT: 168 LBS | TEMPERATURE: 97.9 F | HEIGHT: 65 IN | OXYGEN SATURATION: 97 % | SYSTOLIC BLOOD PRESSURE: 136 MMHG

## 2025-04-28 DIAGNOSIS — I10 BENIGN ESSENTIAL HYPERTENSION: Chronic | ICD-10-CM

## 2025-04-28 DIAGNOSIS — N40.1 BENIGN PROSTATIC HYPERPLASIA WITH WEAK URINARY STREAM: Chronic | ICD-10-CM

## 2025-04-28 DIAGNOSIS — E78.2 MIXED HYPERLIPIDEMIA: Chronic | ICD-10-CM

## 2025-04-28 DIAGNOSIS — K21.00 GASTROESOPHAGEAL REFLUX DISEASE WITH ESOPHAGITIS WITHOUT HEMORRHAGE: Chronic | ICD-10-CM

## 2025-04-28 DIAGNOSIS — Z86.0100 HISTORY OF COLON POLYPS: Chronic | ICD-10-CM

## 2025-04-28 DIAGNOSIS — I65.23 BILATERAL CAROTID ARTERY STENOSIS: Chronic | ICD-10-CM

## 2025-04-28 DIAGNOSIS — R39.12 BENIGN PROSTATIC HYPERPLASIA WITH WEAK URINARY STREAM: Chronic | ICD-10-CM

## 2025-04-28 DIAGNOSIS — K59.4 PROCTALGIA FUGAX: Chronic | ICD-10-CM

## 2025-04-28 DIAGNOSIS — Z87.39 HISTORY OF GOUT: Chronic | ICD-10-CM

## 2025-04-28 DIAGNOSIS — Z98.61 HISTORY OF PTCA: Chronic | ICD-10-CM

## 2025-04-28 DIAGNOSIS — I71.40 ABDOMINAL AORTIC ANEURYSM (AAA) WITHOUT RUPTURE, UNSPECIFIED PART: Chronic | ICD-10-CM

## 2025-04-28 DIAGNOSIS — Z86.79 HISTORY OF ABDOMINAL AORTIC ANEURYSM (AAA): Chronic | ICD-10-CM

## 2025-04-28 DIAGNOSIS — Z86.73 HISTORY OF STROKE: Chronic | ICD-10-CM

## 2025-04-28 DIAGNOSIS — J30.1 SEASONAL ALLERGIC RHINITIS DUE TO POLLEN: Chronic | ICD-10-CM

## 2025-04-28 DIAGNOSIS — J43.1 PANLOBULAR EMPHYSEMA: Chronic | ICD-10-CM

## 2025-04-28 DIAGNOSIS — D75.89 MACROCYTOSIS: Chronic | ICD-10-CM

## 2025-04-28 DIAGNOSIS — Z51.81 THERAPEUTIC DRUG MONITORING: ICD-10-CM

## 2025-04-28 DIAGNOSIS — Z00.00 ENCOUNTER FOR SUBSEQUENT ANNUAL WELLNESS VISIT (AWV) IN MEDICARE PATIENT: Primary | ICD-10-CM

## 2025-04-28 DIAGNOSIS — R91.8 MULTIPLE PULMONARY NODULES: Chronic | ICD-10-CM

## 2025-04-28 DIAGNOSIS — R73.01 IMPAIRED FASTING GLUCOSE: Chronic | ICD-10-CM

## 2025-04-28 DIAGNOSIS — Z95.1 HX OF CABG: Chronic | ICD-10-CM

## 2025-04-28 DIAGNOSIS — E55.9 VITAMIN D DEFICIENCY: Chronic | ICD-10-CM

## 2025-04-28 DIAGNOSIS — Z87.891 FORMER CIGARETTE SMOKER: Chronic | ICD-10-CM

## 2025-04-28 PROBLEM — I65.29 CAROTID ARTERY STENOSIS: Chronic | Status: ACTIVE | Noted: 2024-05-01

## 2025-04-28 RX ORDER — VALSARTAN 160 MG/1
TABLET ORAL
Start: 2025-04-28

## 2025-04-28 NOTE — ASSESSMENT & PLAN NOTE
Orders:    CK; Future    NMR LipoProfile; Future    T4, Free; Future    T3, Free; Future    TSH; Future    Comprehensive Metabolic Panel; Future

## 2025-04-28 NOTE — ASSESSMENT & PLAN NOTE
Orders:    valsartan (Diovan) 160 MG tablet; Take 1 p.o. daily for high blood pressure    Comprehensive Metabolic Panel; Future

## 2025-04-28 NOTE — PROGRESS NOTES
Subjective   The ABCs of the Annual Wellness Visit  Medicare Wellness Visit      Vernon Willis is a 74 y.o. patient who presents for a Medicare Wellness Visit.    The following portions of the patient's history were reviewed and   updated as appropriate: allergies, current medications, past family history, past medical history, past social history, past surgical history, and problem list.    Compared to one year ago, the patient's physical   health is better.  Compared to one year ago, the patient's mental   health is the same.    Recent Hospitalizations:  He was not admitted to the hospital during the last year.     Current Medical Providers:  Patient Care Team:  Ryan Hutchinson MD as PCP - General (Internal Medicine)    Outpatient Medications Prior to Visit   Medication Sig Dispense Refill    ascorbic acid (VITAMIN C) 1000 MG tablet       aspirin 81 MG tablet Take 1 tablet by mouth Daily.      chlorhexidine (PERIDEX) 0.12 % solution Apply 15 mL to the mouth or throat 2 (Two) Times a Day. 2838 mL 2    Cholecalciferol (VITAMIN D3) 5000 units capsule capsule Take  by mouth.      clindamycin 1 % gel APPLY TO AFFECTED AREA(S) TWO TIMES A DAY AS DIRECTED 60 g 5    clopidogrel (PLAVIX) 75 MG tablet Take 1 p.o. daily for blood thinner 30 tablet     Cyanocobalamin (B-12) 1000 MCG tablet Take 1 tablet by mouth Daily.      desonide (DESOWEN) 0.05 % cream Apply  topically to the appropriate area as directed 2 (Two) Times a Day. 60 g 1    diazePAM (VALIUM) 2 MG tablet TAKE ONE TABLET BY MOUTH TWICE A DAY AS NEEDED FOR ANXIETY 60 tablet 3    erythromycin (ROMYCIN) 5 MG/GM ophthalmic ointment PUT ON QTIP AND APPLY TO EYELID AT BEDTIME      esomeprazole (nexIUM) 40 MG capsule TAKE 1 CAPSULE BY MOUTH DAILY BEFORE FIRST MEAL FOR REFLUX 90 capsule 2    ezetimibe (ZETIA) 10 MG tablet TAKE 1 TABLET BY MOUTH DAILY FOR HIGH CHOLESTEROL 90 tablet 3    fluorometholone (FML) 0.1 % ophthalmic suspension INSTILL 1 DROP INTO EACH  EYE TWICE DAILY  5    fluticasone (FLONASE) 50 MCG/ACT nasal spray SPRAY 2 SPRAYS IN NOSTRIL DAILY AS NEEDED 16 g 6    hydrocortisone 2.5 % cream APPLY TO RECTAL/ANAL REGION TWO TIMES A DAY AS NEEDED 30 g 3    metoprolol succinate XL (TOPROL-XL) 50 MG 24 hr tablet       Multiple Vitamins-Minerals (ZINC PO) Take  by mouth.      podofilox (CONDYLOX) 0.5 % external solution APPLY TO THE AFFECTED AREA(S) 2 TIMES A DAY AS DIRECTED 3.5 mL 1    rosuvastatin (Crestor) 40 MG tablet Take 1 p.o. daily for high cholesterol as directed      Sodium Fluoride (PreviDent 5000 Booster Plus) 1.1 % paste APPLY A THIN RIBBON AND BRUSH TEETH TWICE A DAY AS DIRECTED 100 mL 3    Vitamin A 2400 MCG (8000 UT) capsule Take  by mouth Daily.      VITAMIN E PO Take  by mouth.      Zinc 100 MG tablet       atorvastatin (LIPITOR) 80 MG tablet Take 1 tablet by mouth Daily.      tamsulosin (FLOMAX) 0.4 MG capsule 24 hr capsule Take 1 p.o. twice daily for prostate 60 capsule 6     No facility-administered medications prior to visit.     No opioid medication identified on active medication list. I have reviewed chart for other potential  high risk medication/s and harmful drug interactions in the elderly.      Aspirin is on active medication list. Aspirin use is indicated based on review of current medical condition/s. Pros and cons of this therapy have been discussed today. Benefits of this medication outweigh potential harm.  Patient has been encouraged to continue taking this medication.  .      Patient Active Problem List   Diagnosis    History of abdominal aortic aneurysm (AAA), 6/23/2017--endovascular AAA repair with stent.    Occlusive coronary artery disease, 12/2/2015--cardiac cath: LIMA to LAD (patent); RADHA to RCA (occluded 100%); SVG to obtuse marginal (occluded 100%); SVG to diagonal (occluded 100%).      Benign essential hypertension    Hyperlipidemia    History of gout    Chronic allergic conjunctivitis    Vitamin D deficiency    Benign  "prostatic hyperplasia with weak urinary stream    Therapeutic drug monitoring    History of stroke    History of PTCA, 7/15/2013--drug-eluting stent proximal and origin of LM.    Hx of CABG, 2004--LIMA to LAD; RADHA to RCA; SVG to obtuse marginal; SVG to diagonal.    Pulmonary emphysema    Multiple pulmonary nodules, 5/12/2022--no change.  3/17/2021--stable 3.1 millimeter left upper lobe nodule.  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    History of colon polyps    Genital warts    Allergic rhinitis    Gastroesophageal reflux disease with esophagitis without hemorrhage    Proctalgia fugax    Macrocytosis    Impaired fasting glucose    Heart palpitations    Former cigarette smoker    Bilateral carotid artery stenosis, 7/1/2021--60-70% right; 50-60% left.    Carotid artery stenosis    Abdominal aortic aneurysm     Advance Care Planning Advance Directive is on file.  ACP discussion was held with the patient during this visit. Patient has an advance directive in EMR which is still valid.             Objective   Vitals:    04/28/25 0822   BP: 136/68   Pulse: 63   Resp: 16   Temp: 97.9 °F (36.6 °C)   TempSrc: Oral   SpO2: 97%   Weight: 76.2 kg (168 lb)   Height: 165.1 cm (65\")       Estimated body mass index is 27.96 kg/m² as calculated from the following:    Height as of this encounter: 165.1 cm (65\").    Weight as of this encounter: 76.2 kg (168 lb).    BMI is >= 25 and <30. (Overweight) The following options were offered after discussion;: exercise counseling/recommendations and nutrition counseling/recommendations       Does the patient have evidence of cognitive impairment? No  Lab Results   Component Value Date    CHLPL 110 04/22/2025    TRIG 36 04/22/2025    HGBA1C 6.0 (H) 04/22/2025       Vitamin D normal at 55.2.  Uric acid 5.9.  PSA 0.3.  Thyroid function tests are normal.  Total cholesterol 110, triglyceride 36, LDL particle #383, HDL particle #33.6.  Hemoglobin " A1c 6.0.  CMP normal.  Urinalysis normal.                                                                                        Health  Risk Assessment    Smoking Status:  Social History     Tobacco Use   Smoking Status Former    Current packs/day: 0.00    Average packs/day: 0.5 packs/day for 50.0 years (25.0 ttl pk-yrs)    Types: Cigarettes    Start date:     Quit date: 2020    Years since quittin.3   Smokeless Tobacco Never     Alcohol Consumption:  Social History     Substance and Sexual Activity   Alcohol Use No       Fall Risk Screen  STEADI Fall Risk Assessment was completed, and patient is at LOW risk for falls.Assessment completed on:2025    Depression Screening   Little interest or pleasure in doing things? Not at all   Feeling down, depressed, or hopeless? Not at all   PHQ-2 Total Score 0      Health Habits and Functional and Cognitive Screenin/28/2025     8:23 AM   Functional & Cognitive Status   Do you have difficulty preparing food and eating? No   Do you have difficulty bathing yourself, getting dressed or grooming yourself? No   Do you have difficulty using the toilet? No   Do you have difficulty moving around from place to place? No   Do you have trouble with steps or getting out of a bed or a chair? No   Current Diet Well Balanced Diet   Dental Exam Up to date   Eye Exam Up to date   Exercise (times per week) 7 times per week   Current Exercises Include Walking;Running/Jogging   Do you need help using the phone?  No   Are you deaf or do you have serious difficulty hearing?  No   Do you need help to go to places out of walking distance? No   Do you need help shopping? No   Do you need help preparing meals?  No   Do you need help with housework?  No   Do you need help with laundry? No   Do you need help taking your medications? No   Do you need help managing money? No   Do you ever drive or ride in a car without wearing a seat belt? No   Have you felt unusual stress, anger  or loneliness in the last month? No   Who do you live with? Alone   If you need help, do you have trouble finding someone available to you? No   Have you been bothered in the last four weeks by sexual problems? No   Do you have difficulty concentrating, remembering or making decisions? No           Age-appropriate Screening Schedule:  Refer to the list below for future screening recommendations based on patient's age, sex and/or medical conditions. Orders for these recommended tests are listed in the plan section. The patient has been provided with a written plan.    Health Maintenance List  Health Maintenance   Topic Date Due    LUNG CANCER SCREENING  02/11/2021    INFLUENZA VACCINE  07/01/2025    LIPID PANEL  04/22/2026    ANNUAL WELLNESS VISIT  04/28/2026    COLORECTAL CANCER SCREENING  01/12/2028    HEPATITIS C SCREENING  Completed    Pneumococcal Vaccine 50+  Completed    AAA SCREEN ONCE  Completed    COVID-19 Vaccine  Discontinued    TDAP/TD VACCINES  Discontinued    ZOSTER VACCINE  Discontinued                                                                                                                                                CMS Preventative Services Quick Reference  Risk Factors Identified During Encounter  Immunizations Discussed/Encouraged:  Up-to-date    The above risks/problems have been discussed with the patient.  Pertinent information has been shared with the patient in the After Visit Summary.  An After Visit Summary and PPPS were made available to the patient.    Follow Up:   Next Medicare Wellness visit to be scheduled in 1 year.     Assessment & Plan  Encounter for subsequent annual wellness visit (AWV) in Medicare patient         Impaired fasting glucose    Orders:    Hemoglobin A1c; Future    Comprehensive Metabolic Panel; Future    Macrocytosis    Orders:    CBC (No Diff); Future    Vitamin D deficiency    Orders:    Vitamin D,25-Hydroxy; Future    Benign prostatic hyperplasia with  weak urinary stream    Orders:    PSA DIAGNOSTIC; Future    History of gout    Orders:    Uric Acid; Future    Hyperlipidemia       Orders:    CK; Future    NMR LipoProfile; Future    T4, Free; Future    T3, Free; Future    TSH; Future    Comprehensive Metabolic Panel; Future    History of abdominal aortic aneurysm (AAA), 6/23/2017--endovascular AAA repair with stent.         History of stroke         History of PTCA, 7/15/2013--drug-eluting stent proximal and origin of LM.         Panlobular emphysema           Hx of CABG, 2004--LIMA to LAD; RADHA to RCA; SVG to obtuse marginal; SVG to diagonal.         Multiple pulmonary nodules, 5/12/2022--no change.  3/17/2021--stable 3.1 millimeter left upper lobe nodule.  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         History of colon polyps         Gastroesophageal reflux disease with esophagitis without hemorrhage         Proctalgia fugax         Former cigarette smoker         Bilateral carotid artery stenosis, 7/1/2021--60-70% right; 50-60% left.         Bilateral carotid artery stenosis         Abdominal aortic aneurysm (AAA) without rupture, unspecified part         Allergic rhinitis         Therapeutic drug monitoring    Orders:    CBC (No Diff); Future    Benign essential hypertension      Orders:    valsartan (Diovan) 160 MG tablet; Take 1 p.o. daily for high blood pressure    Comprehensive Metabolic Panel; Future         Follow Up:   Return in about 1 year (around 4/28/2026).

## 2025-04-29 ENCOUNTER — PRIOR AUTHORIZATION (OUTPATIENT)
Dept: INTERNAL MEDICINE | Facility: CLINIC | Age: 75
End: 2025-04-29
Payer: MEDICARE

## 2025-04-29 NOTE — TELEPHONE ENCOUNTER
Clindamycin Phos (Twice-Daily) 1% gel PA done on cover my meds, waiting on response.       (Key: [id number])  Rx #: [id number]    Additional Information Required  The pharmacy has received a paid claim for the submitted date of service. Please review and if needed, resubmit after 7 days.

## 2025-05-27 RX ORDER — PODOFILOX 5 MG/ML
SOLUTION TOPICAL
Qty: 3.5 ML | Refills: 1 | Status: SHIPPED | OUTPATIENT
Start: 2025-05-27

## 2025-05-29 ENCOUNTER — HOSPITAL ENCOUNTER (OUTPATIENT)
Facility: HOSPITAL | Age: 75
Discharge: HOME | End: 2025-05-29
Admitting: SURGERY
Payer: MEDICARE

## 2025-05-29 ENCOUNTER — OFFICE VISIT (OUTPATIENT)
Age: 75
End: 2025-05-29
Payer: MEDICARE

## 2025-05-29 VITALS
HEIGHT: 65 IN | HEART RATE: 58 BPM | DIASTOLIC BLOOD PRESSURE: 65 MMHG | SYSTOLIC BLOOD PRESSURE: 148 MMHG | BODY MASS INDEX: 27.99 KG/M2 | RESPIRATION RATE: 17 BRPM | WEIGHT: 168 LBS

## 2025-05-29 DIAGNOSIS — I65.23 BILATERAL CAROTID ARTERY STENOSIS: Chronic | ICD-10-CM

## 2025-05-29 DIAGNOSIS — I87.323 CHRONIC VENOUS HYPERTENSION WITH INFLAMMATION INVOLVING BOTH SIDES: ICD-10-CM

## 2025-05-29 DIAGNOSIS — Z86.79 HISTORY OF ABDOMINAL AORTIC ANEURYSM (AAA): Chronic | ICD-10-CM

## 2025-05-29 DIAGNOSIS — I87.2 VENOUS (PERIPHERAL) INSUFFICIENCY: ICD-10-CM

## 2025-05-29 DIAGNOSIS — I71.43 INFRARENAL ABDOMINAL AORTIC ANEURYSM (AAA) WITHOUT RUPTURE: Primary | Chronic | ICD-10-CM

## 2025-05-29 LAB
BH CV LOW VAS LEFT EXTERNAL ILIAC AUGMENT: NORMAL
BH CV LOW VAS LEFT EXTERNAL ILIAC COMPRESS: NORMAL
BH CV LOWER VASCULAR LEFT COMMON FEMORAL AUGMENT: NORMAL
BH CV LOWER VASCULAR LEFT COMMON FEMORAL COMPETENT: NORMAL
BH CV LOWER VASCULAR LEFT COMMON FEMORAL COMPRESS: NORMAL
BH CV LOWER VASCULAR LEFT COMMON FEMORAL PHASIC: NORMAL
BH CV LOWER VASCULAR LEFT COMMON FEMORAL SPONT: NORMAL
BH CV LOWER VASCULAR LEFT DISTAL FEMORAL COMPRESS: NORMAL
BH CV LOWER VASCULAR LEFT EXTERNAL ILIAC COMPETENT: NORMAL
BH CV LOWER VASCULAR LEFT EXTERNAL ILIAC PHASIC: NORMAL
BH CV LOWER VASCULAR LEFT EXTERNAL ILIAC SPONT: NORMAL
BH CV LOWER VASCULAR LEFT GASTRONEMIUS COMPRESS: NORMAL
BH CV LOWER VASCULAR LEFT GREATER SAPH AK COMPRESS: NORMAL
BH CV LOWER VASCULAR LEFT GREATER SAPH BK COMPRESS: NORMAL
BH CV LOWER VASCULAR LEFT LESSER SAPH COMPRESS: NORMAL
BH CV LOWER VASCULAR LEFT MID FEMORAL AUGMENT: NORMAL
BH CV LOWER VASCULAR LEFT MID FEMORAL COMPETENT: NORMAL
BH CV LOWER VASCULAR LEFT MID FEMORAL COMPRESS: NORMAL
BH CV LOWER VASCULAR LEFT PERONEAL AUGMENT: NORMAL
BH CV LOWER VASCULAR LEFT PERONEAL COMPRESS: NORMAL
BH CV LOWER VASCULAR LEFT POPLITEAL AUGMENT: NORMAL
BH CV LOWER VASCULAR LEFT POPLITEAL COMPETENT: NORMAL
BH CV LOWER VASCULAR LEFT POPLITEAL COMPRESS: NORMAL
BH CV LOWER VASCULAR LEFT POSTERIOR TIBIAL AUGMENT: NORMAL
BH CV LOWER VASCULAR LEFT POSTERIOR TIBIAL COMPRESS: NORMAL
BH CV LOWER VASCULAR LEFT PROFUNDA FEMORAL AUGMENT: NORMAL
BH CV LOWER VASCULAR LEFT PROFUNDA FEMORAL COMPRESS: NORMAL
BH CV LOWER VASCULAR LEFT PROFUNDA FEMORAL PHASIC: NORMAL
BH CV LOWER VASCULAR LEFT PROFUNDA FEMORAL SPONT: NORMAL
BH CV LOWER VASCULAR LEFT PROXIMAL FEMORAL AUGMENT: NORMAL
BH CV LOWER VASCULAR LEFT PROXIMAL FEMORAL COMPETENT: NORMAL
BH CV LOWER VASCULAR LEFT PROXIMAL FEMORAL COMPRESS: NORMAL
BH CV LOWER VASCULAR LEFT PROXIMAL FEMORAL PHASIC: NORMAL
BH CV LOWER VASCULAR LEFT PROXIMAL FEMORAL SPONT: NORMAL
BH CV LOWER VASCULAR LEFT SAPHENOFEMORAL JUNCTION AUGMENT: NORMAL
BH CV LOWER VASCULAR LEFT SAPHENOFEMORAL JUNCTION COMPETENT: NORMAL
BH CV LOWER VASCULAR LEFT SAPHENOFEMORAL JUNCTION COMPRESS: NORMAL
BH CV LOWER VASCULAR LEFT SAPHENOFEMORAL JUNCTION PHASIC: NORMAL
BH CV LOWER VASCULAR LEFT SAPHENOFEMORAL JUNCTION SPONT: NORMAL
BH CV LOWER VASCULAR LEFT SOLEAL COMPRESS: NORMAL
BH CV LOWER VASCULAR RIGHT COMMON FEMORAL AUGMENT: NORMAL
BH CV LOWER VASCULAR RIGHT COMMON FEMORAL COMPETENT: NORMAL
BH CV LOWER VASCULAR RIGHT COMMON FEMORAL COMPRESS: NORMAL
BH CV LOWER VASCULAR RIGHT COMMON FEMORAL PHASIC: NORMAL
BH CV LOWER VASCULAR RIGHT COMMON FEMORAL SPONT: NORMAL
BH CV LOWER VASCULAR RIGHT DISTAL FEMORAL COMPRESS: NORMAL
BH CV LOWER VASCULAR RIGHT EXTERNAL ILIAC AUGMENT: NORMAL
BH CV LOWER VASCULAR RIGHT EXTERNAL ILIAC COMPETENT: NORMAL
BH CV LOWER VASCULAR RIGHT EXTERNAL ILIAC COMPRESS: NORMAL
BH CV LOWER VASCULAR RIGHT EXTERNAL ILIAC PHASIC: NORMAL
BH CV LOWER VASCULAR RIGHT EXTERNAL ILIAC SPONT: NORMAL
BH CV LOWER VASCULAR RIGHT GASTRONEMIUS COMPRESS: NORMAL
BH CV LOWER VASCULAR RIGHT GREATER SAPH AK COMPRESS: NORMAL
BH CV LOWER VASCULAR RIGHT GREATER SAPH BK COMPRESS: NORMAL
BH CV LOWER VASCULAR RIGHT LESSER SAPH COMPRESS: NORMAL
BH CV LOWER VASCULAR RIGHT MID FEMORAL AUGMENT: NORMAL
BH CV LOWER VASCULAR RIGHT MID FEMORAL COMPETENT: NORMAL
BH CV LOWER VASCULAR RIGHT MID FEMORAL COMPRESS: NORMAL
BH CV LOWER VASCULAR RIGHT PERONEAL AUGMENT: NORMAL
BH CV LOWER VASCULAR RIGHT PERONEAL COMPRESS: NORMAL
BH CV LOWER VASCULAR RIGHT POPLITEAL AUGMENT: NORMAL
BH CV LOWER VASCULAR RIGHT POPLITEAL COMPETENT: NORMAL
BH CV LOWER VASCULAR RIGHT POPLITEAL COMPRESS: NORMAL
BH CV LOWER VASCULAR RIGHT POSTERIOR TIBIAL AUGMENT: NORMAL
BH CV LOWER VASCULAR RIGHT POSTERIOR TIBIAL COMPRESS: NORMAL
BH CV LOWER VASCULAR RIGHT PROFUNDA FEMORAL AUGMENT: NORMAL
BH CV LOWER VASCULAR RIGHT PROFUNDA FEMORAL COMPETENT: NORMAL
BH CV LOWER VASCULAR RIGHT PROFUNDA FEMORAL COMPRESS: NORMAL
BH CV LOWER VASCULAR RIGHT PROFUNDA FEMORAL PHASIC: NORMAL
BH CV LOWER VASCULAR RIGHT PROFUNDA FEMORAL SPONT: NORMAL
BH CV LOWER VASCULAR RIGHT PROXIMAL FEMORAL AUGMENT: NORMAL
BH CV LOWER VASCULAR RIGHT PROXIMAL FEMORAL COMPETENT: NORMAL
BH CV LOWER VASCULAR RIGHT PROXIMAL FEMORAL COMPRESS: NORMAL
BH CV LOWER VASCULAR RIGHT PROXIMAL FEMORAL PHASIC: NORMAL
BH CV LOWER VASCULAR RIGHT PROXIMAL FEMORAL SPONT: NORMAL
BH CV LOWER VASCULAR RIGHT SAPHENOFEMORAL JUNCTION AUGMENT: NORMAL
BH CV LOWER VASCULAR RIGHT SAPHENOFEMORAL JUNCTION COMPETENT: NORMAL
BH CV LOWER VASCULAR RIGHT SAPHENOFEMORAL JUNCTION COMPRESS: NORMAL
BH CV LOWER VASCULAR RIGHT SAPHENOFEMORAL JUNCTION PHASIC: NORMAL
BH CV LOWER VASCULAR RIGHT SAPHENOFEMORAL JUNCTION SPONT: NORMAL
BH CV LOWER VASCULAR RIGHT SOLEAL COMPRESS: NORMAL

## 2025-05-29 PROCEDURE — 93970 EXTREMITY STUDY: CPT

## 2025-05-29 NOTE — PROGRESS NOTES
Patient Name: Vernon Willis    MRN: [id number] Encounter Date: 05/29/2025      Consulting Service: Vascular Surgery    Referring Provider: No ref. provider found       CHIEF COMPLAINT:  Chief Complaint   Patient presents with    Carotid Artery Disease       Subjective    HPI: Vernon Willis is a 74 y.o. male being seen for evaluation/management of known aortic aneurysmal disease.  The patient has known abdominal aortic aneurysm that is being followed.  Prior treatment has been performed with endovascular stent graft repair.  They remain aware of the genetic component of aneurysm disease and the need to avoid smoking, uncontrolled hypertension and possibly the fluoroquinolone family of antibiotics as independent risk factors for aneurysm growth and rupture.  Patient follows up today for review of their current imaging studies including CT angiogram.  Today it appears the aneurysm disease  remains stable relative to last studies.  Based on these findings we have recommended continued long-term follow-up.    PAST MEDICAL HISTORY:   Past Medical History:   Diagnosis Date    Abdominal aortic aneurysm 05/01/2024    3.2 cm         Abdominal aortic aneurysm without rupture 04/15/2021    Allergic rhinitis 08/13/2019    Benign essential hypertension 08/13/2019    Benign prostatic hyperplasia with weak urinary stream 08/13/2019    Bilateral carotid artery stenosis, 7/1/2021--60-70% right; 50-60% left. 11/22/2021 July 1, 2021--carotid Doppler study reveals 60-70% right ICA stenosis; 50-60% left ICA stenosis.    CAD (coronary artery disease)     Carotid artery stenosis 05/01/2024    right >70% left 50-60%         Carotid bruit 05/20/2021    Chronic allergic conjunctivitis 08/13/2019    Cigarette nicotine dependence  08/13/2019    Coronary angioplasty status     Former cigarette smoker 11/16/2021    Gastroesophageal reflux disease with esophagitis 08/13/2019    Gastroesophageal reflux disease with esophagitis  without hemorrhage 08/13/2019    Genital warts 08/13/2019    Gout     Heart palpitations 11/25/2020    History of abdominal aortic aneurysm (AAA), 6/23/2017--endovascular AAA repair with stent. 08/13/2019 August 1, 2017--CTA of the abdomen: There has been endovascular repair of the abdominal aortic aneurysm with stent extending from just below the origin of the left renal vein into both common iliac arteries. No evidence for endoleak on arterial phase imaging. The excluded aneurysmal sac measures 4.8 cm in maximal diameter, previously 5.0 cm. Aortoiliac atherosclerosis with moderate narrowing at th    History of cardiac catheterization 08/13/2019 December 2, 2015--cardiac catheterization.  INDICATION(S): This is a 64-year-old male with a history of coronary artery disease status post coronary artery bypass grafting. He presented to Dr. Clarke with symptoms consistent with his prior angina. He underwent a nuclear stress test that showed a mid-anterior wall myocardial infarction with a small amount of ischemia in the ventricular apex. He was s    History of colon polyps 08/13/2019 February 2016--colonoscopy with polypectomy.  Pathology not known.  2010--colonoscopy revealed a serrated adenoma.    History of gout 08/13/2019    History of posterior vitreous detachment of right eye, 10/15/2016--repair. 08/13/2019 October 5, 2016--vitrectomy, membrane peel, panretinal endophotocoagulation laser, right eye for preretinal membrane, vitreous hemorrhage, and posterior vitreous detachment of right eye.         History of PTCA, 7/15/2013--drug-eluting stent proximal and origin of LM. 08/13/2019    July 15, 2013--successful drug-eluting stent PCI to the proximal and origin of the left main using vascular ultrasound guidance.    History of stroke     History of stroke, questionable. 08/13/2019           Hx of CABG, 2004--LIMA to LAD; RADHA to RCA; SVG to obtuse marginal; SVG to diagonal. 08/13/2019 December 2,  2015--cardiac catheterization: LIMA to LAD (patent); RADHA to RCA (occluded 100%); SVG to obtuse marginal (occluded 100%); SVG to diagonal (occluded 100%).  2004--four-vessel CABG.  LIMA to LAD; RADHA to RCA; SVG to obtuse marginal; SVG to diagonal.    Hyperlipidemia 08/13/2019    Impaired fasting glucose 05/21/2020    Macrocytosis 08/13/2019 July 25, 2019--MCV elevated at 102.2.  Vitamin B12 level was above normal at 1208.  Methylmalonic acid was not performed.    Multiple pulmonary nodules, 3/17/2021--stable 3.1 millimeter left upper lobe nodule.  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  Yearly screening recommen 08/13/2019 March 17, 2021--CT scan of the chest reveals a solitary stable left upper lobe 3.5 mm nodule.  Persistent reticulonodular interstitial disease in the right middle lobe likely to be RB-ILD in this patient with smoking history and not neoplastic.  Patient is to return to annual lung screening.  Thus, Fleischner's criteria not applied.  January 29, 2019--CT scan of the chest without contrast performe    Multiple pulmonary nodules, 5/12/2022--no change.  3/17/2021--stable 3.1 millimeter left upper lobe nodule.  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 08/13/2019    May 12, 2022--CT scan of the chest reveals mild reticular scarring in the right middle lobe that is unchanged.  No new infiltrates or masses are seen.  Underlying emphysema noted.  There is a left apical nodule measuring 3 to 4 mm which is unchanged.  Recommend yearly screening.     March 17, 2021--CT scan of the chest reveals a solitary stable left upper lobe 3.5 mm nodule.  Persistent reticulono    Occlusive coronary artery disease, 12/2/2015--cardiac cath: LIMA to LAD (patent); RADHA to RCA (occluded 100%); SVG to obtuse marginal (occluded 100%); SVG to diagonal (occluded 100%).   08/13/2019 December 2, 2015--cardiac catheterization:  [place] to LAD (patent); RADHA to RCA (occluded 100%); SVG to obtuse marginal (occluded 100%); SVG to diagonal (occluded 100%).  July 15, 2013--successful drug-eluting stent PCI to the proximal and origin of the left main using vascular ultrasound guidance.  2004--four-vessel CABG.  LIMA to LAD; RADHA to RCA; SVG to obtuse marginal; SVG to diagonal.   HPI: Fra    Other nonspecific abnormal finding of lung field     Overweight (BMI 25.0-29.9) 05/02/2022    Panlobular emphysema     Personal history of colonic polyps     Personal history of other diseases of the musculoskeletal system and connective tissue     Presence of aortocoronary bypass graft     Proctalgia fugax 08/13/2019    Pulmonary emphysema 08/13/2019 January 29, 2019--CT scan of the chest without contrast performed for abnormal CT scan reveals minimal change in clustered nodularity and tree-in-bud nodularity involving dominantly the upper lobes and right middle lobe, likely postinfectious or postinflammatory.  A few new areas of peripheral mucus plugging noted at the right lower lobe.  No new or enlarging pulmonary nodules.  Return to annual s    Vitamin D deficiency 08/13/2019      PAST SURGICAL HISTORY:   Past Surgical History:   Procedure Laterality Date    ABDOMINAL AORTIC ANEURYSM REPAIR W/ ENDOLUMINAL GRAFT  06/23/2017 June 23, 2017--endovascular repair of AAA.    CARDIAC CATHETERIZATION  12/02/2015 December 2, 2015--cardiac catheterization.  See past medical history for details.    CARDIAC CATHETERIZATION  08/2019    CAROTID STENT  08/2019    CATARACT EXTRACTION, BILATERAL      COLONOSCOPY  2010 2010--colonoscopy revealed a serrated adenoma.    COLONOSCOPY W/ POLYPECTOMY  02/2016 February 2016--colonoscopy with polypectomy.  Pathology not known.    CORONARY ANGIOPLASTY WITH STENT PLACEMENT  07/15/2013    July 15, 2013--successful drug-eluting stent PCI to the proximal and origin of the left main using vascular ultrasound guidance.     CORONARY ARTERY BYPASS GRAFT  2004--four-vessel CABG.  LIMA to LAD; RADHA to RCA; SVG to obtuse marginal; SVG to diagonal.    PARS PLANA VITRECTOMY W/ ENDOPHOTOCOAGULATION  10/05/2016    2016--vitrectomy, membrane peel, panretinal endophotocoagulation laser, right eye for preretinal membrane, vitreous hemorrhage, and posterior vitreous detachment of right eye.    UPPER GASTROINTESTINAL ENDOSCOPY  2016--EGD reportedly revealed esophagitis and gastritis.    VITRECTOMY        FAMILY HISTORY:   Family History   Problem Relation Age of Onset    Diabetes type II Mother     Coronary artery disease Father     Diabetes type II Father     Kidney disease Father     Hypertension Father     Heart disease Father     Crohn's disease Sister     Coronary artery disease Brother         Brother  of a myocardial infarction at age 53    Stroke Other     Diabetes Other     Kidney failure Other       SOCIAL HISTORY:   Social History     Tobacco Use    Smoking status: Former     Current packs/day: 0.00     Average packs/day: 0.5 packs/day for 50.0 years (25.0 ttl pk-yrs)     Types: Cigarettes     Start date:      Quit date: 2020     Years since quittin.4    Smokeless tobacco: Never   Vaping Use    Vaping status: Never Used   Substance Use Topics    Alcohol use: No    Drug use: No      MEDICATIONS:   Current Outpatient Medications on File Prior to Visit   Medication Sig Dispense Refill    ascorbic acid (VITAMIN C) 1000 MG tablet       aspirin 81 MG tablet Take 1 tablet by mouth Daily.      chlorhexidine (PERIDEX) 0.12 % solution Apply 15 mL to the mouth or throat 2 (Two) Times a Day. 2838 mL 2    Cholecalciferol (VITAMIN D3) 5000 units capsule capsule Take  by mouth.      clindamycin 1 % gel APPLY TO AFFECTED AREA(S) TWO TIMES A DAY AS DIRECTED 60 g 5    clopidogrel (PLAVIX) 75 MG tablet Take 1 p.o. daily for blood thinner 30 tablet     Cyanocobalamin (B-12) 1000 MCG tablet Take 1  "tablet by mouth Daily.      desonide (DESOWEN) 0.05 % cream Apply  topically to the appropriate area as directed 2 (Two) Times a Day. 60 g 1    diazePAM (VALIUM) 2 MG tablet TAKE ONE TABLET BY MOUTH TWICE A DAY AS NEEDED FOR ANXIETY 60 tablet 3    erythromycin (ROMYCIN) 5 MG/GM ophthalmic ointment PUT ON QTIP AND APPLY TO EYELID AT BEDTIME      esomeprazole (nexIUM) 40 MG capsule TAKE 1 CAPSULE BY MOUTH DAILY BEFORE FIRST MEAL FOR REFLUX 90 capsule 2    ezetimibe (ZETIA) 10 MG tablet TAKE 1 TABLET BY MOUTH DAILY FOR HIGH CHOLESTEROL 90 tablet 3    fluorometholone (FML) 0.1 % ophthalmic suspension INSTILL 1 DROP INTO EACH EYE TWICE DAILY  5    fluticasone (FLONASE) 50 MCG/ACT nasal spray SPRAY 2 SPRAYS IN NOSTRIL DAILY AS NEEDED 16 g 6    hydrocortisone 2.5 % cream APPLY TO RECTAL/ANAL REGION TWO TIMES A DAY AS NEEDED 30 g 3    metoprolol succinate XL (TOPROL-XL) 50 MG 24 hr tablet       Multiple Vitamins-Minerals (ZINC PO) Take  by mouth.      podofilox (CONDYLOX) 0.5 % external solution APPLY TO AFFECTED AREA(S) 2 TIMES A DAY AS DIRECTED 3.5 mL 1    rosuvastatin (Crestor) 40 MG tablet Take 1 p.o. daily for high cholesterol as directed      Sodium Fluoride (PreviDent 5000 Booster Plus) 1.1 % paste APPLY A THIN RIBBON AND BRUSH TEETH TWICE A DAY AS DIRECTED 100 mL 3    valsartan (Diovan) 160 MG tablet Take 1 p.o. daily for high blood pressure      Vitamin A 2400 MCG (8000 UT) capsule Take  by mouth Daily.      VITAMIN E PO Take  by mouth.      Zinc 100 MG tablet        No current facility-administered medications on file prior to visit.       ALLERGIES: Codeine, Iodine, Msg [monosodium glutamate], and Penicillins       Objective   Vitals:    05/29/25 1033   BP: 148/65   BP Location: Right arm   Patient Position: Sitting   Cuff Size: Adult   Pulse: 58   Resp: 17   Weight: 76.2 kg (168 lb)   Height: 165 cm (64.96\")     Body mass index is 27.99 kg/m².          PHYSICAL EXAM:   Physical Exam  Constitutional:       " Appearance: Normal appearance. He is normal weight.   HENT:      Head: Normocephalic and atraumatic.      Nose: Nose normal.   Eyes:      Extraocular Movements: Extraocular movements intact.      Pupils: Pupils are equal, round, and reactive to light.   Cardiovascular:      Rate and Rhythm: Normal rate.      Pulses:           Carotid pulses are 2+ on the right side and 2+ on the left side.       Radial pulses are 2+ on the right side and 2+ on the left side.        Femoral pulses are 2+ on the right side and 2+ on the left side.       Popliteal pulses are 2+ on the right side and 2+ on the left side.        Dorsalis pedis pulses are 2+ on the right side and 2+ on the left side.        Posterior tibial pulses are 2+ on the right side and 2+ on the left side.      Heart sounds: Normal heart sounds.   Pulmonary:      Effort: Pulmonary effort is normal.      Breath sounds: Normal breath sounds.   Abdominal:      General: Abdomen is flat. Bowel sounds are normal.      Palpations: Abdomen is soft.   Musculoskeletal:         General: Normal range of motion.      Cervical back: Normal range of motion and neck supple.      Right lower le+ Edema present.      Left lower le+ Edema present.   Skin:     General: Skin is warm and dry.   Neurological:      General: No focal deficit present.      Mental Status: He is alert and oriented to person, place, and time. Mental status is at baseline.   Psychiatric:         Mood and Affect: Mood normal.         Thought Content: Thought content normal.          Result Review   LABS:    CBC          2025    09:26   CBC   WBC 7.7    RBC 3.99    Hemoglobin 13.1    Hematocrit 39.2    MCV 98    MCH 32.8    MCHC 33.4    RDW 12.0    Platelets 174      BMP          2025    09:26   BMP   BUN 18    Creatinine 0.94    Sodium 140    Potassium 4.6    Chloride 101    CO2 22    Calcium 9.4      Lipid Panel          2025    09:26   Lipid Panel   Total Cholesterol 110    Triglycerides  36          A1C Last 3 Results          4/22/2025    09:26   HGBA1C Last 3 Results   Hemoglobin A1C 6.0         Results Review:       I reviewed the patient's new clinical results.    The following radiologic or non-invasive studies have been reviewed by me: Reviewed the CT scan  No results found for this or any previous visit from the past 365 days.     No radiology results for the last 30 days.                ASSESSMENT/PLAN:   Diagnoses and all orders for this visit:    1. Infrarenal abdominal aortic aneurysm (AAA) without rupture (Primary)  -     CT Abdomen Pelvis With & Without Contrast; Future    2. History of abdominal aortic aneurysm (AAA), 6/23/2017--endovascular AAA repair with stent.  -     CT Abdomen Pelvis With & Without Contrast; Future    3. Bilateral carotid artery stenosis, 7/1/2021--60-70% right; 50-60% left.  -     CT Angiogram Head; Future  -     CT Angiogram Neck; Future    4. Chronic venous hypertension with inflammation involving both sides  -     Duplex Venous Lower Extremity - Bilateral CAR; Future    5. Venous (peripheral) insufficiency  -     Duplex Venous Lower Extremity - Bilateral CAR; Future       74 y.o. male with known successful repair of abdominal aortic aneurysm by Dr. Shawn georges in the distant past with stent graft in 2017 and things look great on CT scan now with no endoleak and aneurysm shrunk down to 3 cm and no iliac stenosis in the external or common's.  He has some mild mesenteric and internal iliac stenosis that can be watched.  He is having a lot of swelling in his legs and this I think is likely venous in nature as it ends at the ankle.  I have recommended a venous scan to look for reflux and he is going to consider that.  In the meantime he wants to pursue his known carotid disease in a year when he is back for his CT of the abdomen pelvis and he is going to go through [facility] to get both CT of the head and neck and a CT of the abdomen and pelvis at that time.   Will try to set this up in advance.  He is willing to get the ultrasound of his legs or go to try to get that done today if there is a cancellation and we will go from there.    I discussed the plan with the patient who is agreeable to the plan of care at this point. Thank you for this consult.   Follow Up  Return in about 1 year (around 5/29/2026).    Kaden Galaviz MD   05/29/25

## 2025-06-26 DIAGNOSIS — J30.1 SEASONAL ALLERGIC RHINITIS DUE TO POLLEN: Chronic | ICD-10-CM

## 2025-06-26 DIAGNOSIS — R00.2 HEART PALPITATIONS: ICD-10-CM

## 2025-06-26 DIAGNOSIS — K59.4 PROCTALGIA FUGAX: ICD-10-CM

## 2025-06-27 DIAGNOSIS — J30.1 SEASONAL ALLERGIC RHINITIS DUE TO POLLEN: Chronic | ICD-10-CM

## 2025-06-27 RX ORDER — FLUTICASONE PROPIONATE 50 MCG
SPRAY, SUSPENSION (ML) NASAL
Qty: 18.2 G | Refills: 11 | Status: SHIPPED | OUTPATIENT
Start: 2025-06-27

## 2025-06-27 RX ORDER — FLUTICASONE PROPIONATE 50 MCG
SPRAY, SUSPENSION (ML) NASAL
Qty: 16 G | Refills: 6 | Status: SHIPPED | OUTPATIENT
Start: 2025-06-27

## 2025-06-27 RX ORDER — DIAZEPAM 2 MG/1
2 TABLET ORAL 2 TIMES DAILY PRN
Qty: 60 TABLET | Refills: 3 | Status: SHIPPED | OUTPATIENT
Start: 2025-06-27

## 2025-07-11 RX ORDER — SILODOSIN 4 MG/1
8 CAPSULE ORAL
Qty: 60 CAPSULE | Refills: 3 | Status: SHIPPED | OUTPATIENT
Start: 2025-07-11

## 2025-07-11 RX ORDER — SILODOSIN 4 MG/1
8 CAPSULE ORAL
COMMUNITY
End: 2025-07-11 | Stop reason: SDUPTHER

## 2025-07-16 ENCOUNTER — PRIOR AUTHORIZATION (OUTPATIENT)
Dept: INTERNAL MEDICINE | Facility: CLINIC | Age: 75
End: 2025-07-16
Payer: MEDICARE

## 2025-07-16 NOTE — TELEPHONE ENCOUNTER
Vernon Willis (Key: [id number])  Silodosin 4MG capsules  Status: Approved  Coverage Ends on: 12/31/2025

## 2025-07-16 NOTE — TELEPHONE ENCOUNTER
Vernon Willis (Key: [id number])  PA has been sent on 7/16  For Silodosin 4MG capsules  Waiting for response

## 2025-08-18 DIAGNOSIS — K59.4 PROCTALGIA FUGAX: Chronic | ICD-10-CM

## 2025-08-18 RX ORDER — PODOFILOX 5 MG/ML
SOLUTION TOPICAL
Qty: 3.5 ML | Refills: 1 | Status: SHIPPED | OUTPATIENT
Start: 2025-08-18

## 2025-08-18 RX ORDER — HYDROCORTISONE 25 MG/G
CREAM TOPICAL
Qty: 30 G | Refills: 3 | Status: SHIPPED | OUTPATIENT
Start: 2025-08-18